# Patient Record
Sex: FEMALE | Race: WHITE | Employment: UNEMPLOYED | URBAN - METROPOLITAN AREA
[De-identification: names, ages, dates, MRNs, and addresses within clinical notes are randomized per-mention and may not be internally consistent; named-entity substitution may affect disease eponyms.]

---

## 2022-01-18 LAB
ALBUMIN SERPL-MCNC: 4.5 G/DL (ref 3.5–5.2)
ALP BLD-CCNC: 75 U/L (ref 35–104)
ALT SERPL-CCNC: 22 U/L (ref 0–32)
ANION GAP SERPL CALCULATED.3IONS-SCNC: 12 MMOL/L (ref 7–16)
ANISOCYTOSIS: ABNORMAL
AST SERPL-CCNC: 26 U/L (ref 0–31)
BASOPHILS ABSOLUTE: 0 E9/L (ref 0–0.2)
BASOPHILS RELATIVE PERCENT: 0.4 % (ref 0–2)
BILIRUB SERPL-MCNC: 0.6 MG/DL (ref 0–1.2)
BUN BLDV-MCNC: 17 MG/DL (ref 6–23)
BURR CELLS: ABNORMAL
C-REACTIVE PROTEIN: 0.3 MG/DL (ref 0–0.4)
CALCIUM SERPL-MCNC: 9.4 MG/DL (ref 8.6–10.2)
CHLORIDE BLD-SCNC: 106 MMOL/L (ref 98–107)
CO2: 22 MMOL/L (ref 22–29)
CREAT SERPL-MCNC: 0.8 MG/DL (ref 0.5–1)
EOSINOPHILS ABSOLUTE: 0.07 E9/L (ref 0.05–0.5)
EOSINOPHILS RELATIVE PERCENT: 2.6 % (ref 0–6)
GFR AFRICAN AMERICAN: >60
GFR NON-AFRICAN AMERICAN: >60 ML/MIN/1.73
GLUCOSE BLD-MCNC: 249 MG/DL (ref 74–99)
HCT VFR BLD CALC: 30.5 % (ref 34–48)
HEMOGLOBIN: 9.7 G/DL (ref 11.5–15.5)
HYPOCHROMIA: ABNORMAL
LYMPHOCYTES ABSOLUTE: 0.33 E9/L (ref 1.5–4)
LYMPHOCYTES RELATIVE PERCENT: 13.2 % (ref 20–42)
MCH RBC QN AUTO: 28.4 PG (ref 26–35)
MCHC RBC AUTO-ENTMCNC: 31.8 % (ref 32–34.5)
MCV RBC AUTO: 89.2 FL (ref 80–99.9)
MONOCYTES ABSOLUTE: 0.15 E9/L (ref 0.1–0.95)
MONOCYTES RELATIVE PERCENT: 6.1 % (ref 2–12)
NEUTROPHILS ABSOLUTE: 1.95 E9/L (ref 1.8–7.3)
NEUTROPHILS RELATIVE PERCENT: 78.1 % (ref 43–80)
OVALOCYTES: ABNORMAL
PDW BLD-RTO: 14 FL (ref 11.5–15)
PLATELET # BLD: 40 E9/L (ref 130–450)
PLATELET CONFIRMATION: NORMAL
PMV BLD AUTO: 12.6 FL (ref 7–12)
POIKILOCYTES: ABNORMAL
POLYCHROMASIA: ABNORMAL
POTASSIUM SERPL-SCNC: 4.2 MMOL/L (ref 3.5–5)
RBC # BLD: 3.42 E12/L (ref 3.5–5.5)
SODIUM BLD-SCNC: 140 MMOL/L (ref 132–146)
TOTAL PROTEIN: 7.4 G/DL (ref 6.4–8.3)
WBC # BLD: 2.5 E9/L (ref 4.5–11.5)

## 2022-01-19 LAB
GRAM STAIN ORDERABLE: NORMAL
SEDIMENTATION RATE, ERYTHROCYTE: 11 MM/HR (ref 0–20)

## 2022-01-21 LAB
ANAEROBIC CULTURE: NORMAL
ORGANISM: ABNORMAL
WOUND/ABSCESS: ABNORMAL
WOUND/ABSCESS: ABNORMAL

## 2022-04-06 ENCOUNTER — OFFICE VISIT (OUTPATIENT)
Dept: ENDOCRINOLOGY | Age: 62
End: 2022-04-06
Payer: MEDICARE

## 2022-04-06 ENCOUNTER — FOLLOWUP TELEPHONE ENCOUNTER (OUTPATIENT)
Dept: ENDOCRINOLOGY | Age: 62
End: 2022-04-06

## 2022-04-06 VITALS
HEIGHT: 70 IN | HEART RATE: 69 BPM | OXYGEN SATURATION: 99 % | SYSTOLIC BLOOD PRESSURE: 110 MMHG | WEIGHT: 223 LBS | DIASTOLIC BLOOD PRESSURE: 67 MMHG | BODY MASS INDEX: 31.92 KG/M2

## 2022-04-06 DIAGNOSIS — E11.65 UNCONTROLLED TYPE 2 DIABETES MELLITUS WITH HYPERGLYCEMIA (HCC): Primary | ICD-10-CM

## 2022-04-06 DIAGNOSIS — E66.09 CLASS 1 OBESITY DUE TO EXCESS CALORIES WITH SERIOUS COMORBIDITY AND BODY MASS INDEX (BMI) OF 32.0 TO 32.9 IN ADULT: ICD-10-CM

## 2022-04-06 DIAGNOSIS — E03.9 ACQUIRED HYPOTHYROIDISM: ICD-10-CM

## 2022-04-06 LAB — HBA1C MFR BLD: 8.4 %

## 2022-04-06 PROCEDURE — 3052F HG A1C>EQUAL 8.0%<EQUAL 9.0%: CPT | Performed by: CLINICAL NURSE SPECIALIST

## 2022-04-06 PROCEDURE — 2022F DILAT RTA XM EVC RTNOPTHY: CPT | Performed by: CLINICAL NURSE SPECIALIST

## 2022-04-06 PROCEDURE — G8417 CALC BMI ABV UP PARAM F/U: HCPCS | Performed by: CLINICAL NURSE SPECIALIST

## 2022-04-06 PROCEDURE — 3017F COLORECTAL CA SCREEN DOC REV: CPT | Performed by: CLINICAL NURSE SPECIALIST

## 2022-04-06 PROCEDURE — 99205 OFFICE O/P NEW HI 60 MIN: CPT | Performed by: CLINICAL NURSE SPECIALIST

## 2022-04-06 PROCEDURE — G8427 DOCREV CUR MEDS BY ELIG CLIN: HCPCS | Performed by: CLINICAL NURSE SPECIALIST

## 2022-04-06 PROCEDURE — 4004F PT TOBACCO SCREEN RCVD TLK: CPT | Performed by: CLINICAL NURSE SPECIALIST

## 2022-04-06 PROCEDURE — 83036 HEMOGLOBIN GLYCOSYLATED A1C: CPT | Performed by: CLINICAL NURSE SPECIALIST

## 2022-04-06 RX ORDER — CLOPIDOGREL BISULFATE 75 MG/1
TABLET ORAL
COMMUNITY
Start: 2022-01-10

## 2022-04-06 RX ORDER — ATORVASTATIN CALCIUM 40 MG/1
40 TABLET, FILM COATED ORAL DAILY
COMMUNITY

## 2022-04-06 RX ORDER — INSULIN DEGLUDEC INJECTION 100 U/ML
60 INJECTION, SOLUTION SUBCUTANEOUS
COMMUNITY
Start: 2022-03-28 | End: 2022-04-06 | Stop reason: SDUPTHER

## 2022-04-06 RX ORDER — DULAGLUTIDE 1.5 MG/.5ML
1.5 INJECTION, SOLUTION SUBCUTANEOUS WEEKLY
Qty: 12 PEN | Refills: 3
Start: 2022-04-06 | End: 2022-04-18 | Stop reason: SDUPTHER

## 2022-04-06 RX ORDER — LEVOTHYROXINE SODIUM 88 UG/1
TABLET ORAL
COMMUNITY
Start: 2022-02-20

## 2022-04-06 RX ORDER — PYRIDOXINE HCL (VITAMIN B6) 50 MG
50 TABLET ORAL DAILY
COMMUNITY
Start: 2022-03-07 | End: 2022-06-05

## 2022-04-06 RX ORDER — FERROUS SULFATE 325(65) MG
325 TABLET ORAL
COMMUNITY
Start: 2022-03-07 | End: 2022-06-05

## 2022-04-06 RX ORDER — INSULIN DEGLUDEC INJECTION 100 U/ML
INJECTION, SOLUTION SUBCUTANEOUS
Qty: 15 PEN | Refills: 3
Start: 2022-04-06

## 2022-04-06 RX ORDER — IRBESARTAN 150 MG/1
TABLET ORAL
COMMUNITY
Start: 2022-02-20

## 2022-04-06 NOTE — PROGRESS NOTES
[address] [facility] Department of Endocrinology Diabetes and Metabolism   [address] [address] [address] [address]   Phone: [phone]  Fax: [phone]    Date of Service: 4/6/2022    Primary Care Physician: Poonam Newton MD  Referring physician: Indu Herrera MD  Provider: FATOUMATA Carter     Reason for the visit:  Type 2 DM      History of Present Illness: The history is provided by the patient. No  was used. Accuracy of the patient data is excellent. Geeta Morales is a very pleasant 59 y.o. female seen today for diabetes management     Geeta Morales was diagnosed with diabetes at the age of 46   and currently on Metformin 500 mg 1 tablets BID. Tresiba 60 units at night   Decreased doses 2 years ago d/t recall   Was on Trulicity in the past but stopped when [facility] was started   The patient has been checking blood sugar once per day   Bg usually  160-200's   . Please see referral for most recent labs  Most recent A1c results summarized below  Lab Results   Component Value Date    LABA1C 8.4 04/06/2022    LABA1C 8.6 10/31/2017       Patient has had no hypoglycemic episodes   The patient hasn't been mindful of what has been eating and wasn't following diabetes diet    No regular exercise but active at work    I reviewed current medications and the patient has no issues with diabetes medications  Geeta Morales is up to date with eye exam and denied any history of diabetic retinopathy    And also performs  own feet care  Microvascular complications:  No Retinopathy, Nephropathy or Neuropathy   Macrovascular complications: no CAD, PVD, or + Stroke 1/2012  The patient receives Flushot every year and up to date   No HX of pancreatitis  No Hx of MTC  No HX of gastroparesis   No HX of UTI/Mycotic infection       PAST MEDICAL HISTORY   No past medical history on file. PAST SURGICAL HISTORY   No past surgical history on file.     SOCIAL HISTORY   Tobacco: has no history on file for tobacco use. Alcohol:   has no history on file for alcohol use. Drugs:   has no history on file for drug use. FAMILY HISTORY   No family history on file. ALLERGIES AND DRUG REACTIONS   Allergies   Allergen Reactions    Shellfish-Derived Products Hives    Bacitracin Itching    Codeine      Other reaction(s): Other: See Comments, Unknown  Tingling arms, heart palpitations      Penicillins      Other reaction(s): Unknown  In childhood      Azithromycin Rash    Erythromycin Rash    Metoprolol Rash       CURRENT MEDICATIONS   Current Outpatient Medications   Medication Sig Dispense Refill    atorvastatin (LIPITOR) 40 MG tablet Take 40 mg by mouth daily      clopidogrel (PLAVIX) 75 MG tablet       ferrous sulfate (IRON 325) 325 (65 Fe) MG tablet Take 325 mg by mouth daily (with breakfast)      levothyroxine (SYNTHROID) 88 MCG tablet       irbesartan (AVAPRO) 150 MG tablet       pyridoxine (B-6) 50 MG tablet Take 50 mg by mouth daily      Dulaglutide (TRULICITY) 1.5 SI/7.2MB SOPN Inject 1.5 mg into the skin once a week 12 pen 3    metFORMIN (GLUCOPHAGE) 1000 MG tablet Take 1 tablet by mouth 2 times daily (with meals) 180 tablet 3    TRESIBA FLEXTOUCH 100 UNIT/ML SOPN Inject 48 units subcutaneous  at night 15 pen 3     No current facility-administered medications for this visit. Review of Systems  Constitutional: No fever, no chills, no diaphoresis, no generalized weakness. HEENT: No blurred vision, No sore throat, no ear pain, no hair loss  Neck: denied any neck swelling, difficulty swallowing,   Cardio-pulmonary: No CP, SOB or palpitation, No orthopnea or PND. No cough or wheezing. GI: No N/V/D, no constipation, No abdominal pain, no melena or hematochezia   : Denied any dysuria, hematuria, flank pain, discharge, or incontinence. Skin: denied any rash, ulcer, Hirsute, or hyperpigmentation.    MSK: denied any joint deformity, joint pain/swelling, muscle pain, or back pain. Neuro: no numbness, no tingling, no weakness, _    OBJECTIVE    /67   Pulse 69   Ht 5' 10\" (1.778 m)   Wt 223 lb (101.2 kg)   SpO2 99%   BMI 32.00 kg/m²   BP Readings from Last 4 Encounters:   04/06/22 110/67     Wt Readings from Last 6 Encounters:   04/06/22 223 lb (101.2 kg)       Physical examination:  General: awake alert, oriented x3, no abnormal position or movements. HEENT: normocephalic non-traumatic, no exophthalmos   Neck: supple, no LN enlargement, no thyromegaly, no thyroid tenderness, no JVD. Pulm: Clear equal air entry no added sounds, no wheezing or rhonchi    CVS: S1 + S2, no murmur, no heave. Dorsalis pedis pulse palpable   Abd: soft lax, no tenderness, no organomegaly, audible bowel sounds. Skin: warm, no lesions, no rash.  No callus, no Ulcers, No acanthosis nigricans  Musculoskeletal: No back tenderness, no kyphosis/scoliosis    Neuro: CN intact, Monofilament sensation normal bilateral , muscle power normal  Psych: normal mood, and affect      Review of Laboratory Data:  I personally reviewed the following lab:  Lab Results   Component Value Date/Time    WBC 2.5 (L) 01/18/2022 03:15 PM    RBC 3.42 (L) 01/18/2022 03:15 PM    HGB 9.7 (L) 01/18/2022 03:15 PM    HCT 30.5 (L) 01/18/2022 03:15 PM    MCV 89.2 01/18/2022 03:15 PM    MCH 28.4 01/18/2022 03:15 PM    MCHC 31.8 (L) 01/18/2022 03:15 PM    RDW 14.0 01/18/2022 03:15 PM    PLT 40 (L) 01/18/2022 03:15 PM    MPV 12.6 (H) 01/18/2022 03:15 PM      Lab Results   Component Value Date/Time     01/18/2022 03:15 PM    K 4.2 01/18/2022 03:15 PM    CO2 22 01/18/2022 03:15 PM    BUN 17 01/18/2022 03:15 PM    CREATININE 0.8 01/18/2022 03:15 PM    CALCIUM 9.4 01/18/2022 03:15 PM    LABGLOM >60 01/18/2022 03:15 PM    GFRAA >60 01/18/2022 03:15 PM      No results found for: TSH, T4FREE, M5VEEKH, FT3, O2LILRH, TSI, TPOABS, THGAB  Lab Results   Component Value Date    LABA1C 8.4 04/06/2022    GLUCOSE 249 01/18/2022     Lab Results Component Value Date    LABA1C 8.4 04/06/2022    LABA1C 8.6 10/31/2017     No results found for: TRIG, HDL, LDLCALC, CHOL  No results found for: Concha 30   Sheryl García, a 59 y.o.-old female seen in for the following issues       Assessment:      Diagnosis Orders   1. Uncontrolled type 2 diabetes mellitus with hyperglycemia (HCC)  POCT glycosylated hemoglobin (Hb A1C)   2. Acquired hypothyroidism     3. Class 1 obesity due to excess calories with serious comorbidity and body mass index (BMI) of 32.0 to 32.9 in adult         Plan:     1. Uncontrolled type 2 diabetes mellitus with hyperglycemia ([facility] [address]   · Patient's diabetes is uncontrolled  · Hemoglobin A1c 8.4%  · Plan: Will restart Trulicity 6.34 mg once weekly for 2 weeks then increase to 1.5 mg thereafter. No contraindications. · Will increase metformin to 1000 mg twice daily  · Will decrease Tresiba to 48 units once per day to help prevent hypoglycemia  · The patient was advised to check blood sugars 2 times a day fasting and before dinner  · Discussed with patient A1c and blood sugar goals   · The patient counseled about the complications of uncontrolled diabetes   · Patient will need routine diabetes maintenance and prevention   · Discussed lifestyle changes including diet and exercise with patient; recommended 150 minutes of moderate intensity exercise per week. 2. Acquired hypothyroidism   · Continue levothyroxine 88 mcg 1 tablet once daily. Last TFT WNL   Patient taking on an empty stomach at least 1 hour before breakfast without combination of other medications   3. Class 1 obesity due to excess calories with serious comorbidity and body mass index (BMI) of 32.0 to 32.9 in adult    Discussed lifestyle changes including diet and exercise with patient in depth.  Also discussed with patient cardiovascular risk associated with obesity           I personally spent > 60 minutes spent  reviewed external notes from PCP and other patient's care team providers, and personally interpreted labs associated with the above diagnosis. I also ordered labs to further assess and manage the above addressed medical conditions. Return in about 3 months (around 7/6/2022). The above issues were reviewed with the patient who understood and agreed with the plan. Thank you for allowing us to participate in the care of this patient. Please do not hesitate to contact us with any additional questions. FATOUMATA Monsivais     [facility] Diabetes Care and Endocrinology   [address] [place] [address]   Phone: [phone]  Fax: [phone]  --------------------------------------------  An electronic signature was used to authenticate this note.  FATOUMATA Monsivais on 4/6/2022 at 11:41 AM

## 2022-04-06 NOTE — TELEPHONE ENCOUNTER
Patient pleasant and receptive to diabetes education. Per recall, patient eats 3 meals/day including a bowl of Cheerios at breakfast, turkey sandwich lunch and variety of meals for dinner. Patient states she will overeat at dinner, likely due to under-eating during the day. Patient does not eat a snack before going to bed. Educated on Diabetes Plate Method and healthy food choices. Made suggestions to balance meals and incorporate HS snack. Demonstrated understanding of carb counting using food lists with carbohydrate serving sizes. Instructed patient on 3 carbohydrate choices/meal and  1-2 choices HS snack. Patient able to plan menu items using meal planning food lists. Read CHO content of food correctly on nutrition facts using food label. Discussed lean proteins, low fat, and high fiber. Reviewed low sodium and avoiding added sugars. Reviewed portion sizes and benefits of measuring foods. Patient to follow up with RDN as needed.

## 2022-04-18 DIAGNOSIS — E11.65 UNCONTROLLED TYPE 2 DIABETES MELLITUS WITH HYPERGLYCEMIA (HCC): Primary | ICD-10-CM

## 2022-04-18 RX ORDER — DULAGLUTIDE 1.5 MG/.5ML
1.5 INJECTION, SOLUTION SUBCUTANEOUS WEEKLY
Qty: 12 PEN | Refills: 3 | Status: SHIPPED
Start: 2022-04-18 | End: 2022-07-06 | Stop reason: SDUPTHER

## 2022-07-06 ENCOUNTER — OFFICE VISIT (OUTPATIENT)
Dept: ENDOCRINOLOGY | Age: 62
End: 2022-07-06
Payer: MEDICARE

## 2022-07-06 VITALS
SYSTOLIC BLOOD PRESSURE: 112 MMHG | WEIGHT: 219 LBS | DIASTOLIC BLOOD PRESSURE: 68 MMHG | OXYGEN SATURATION: 99 % | BODY MASS INDEX: 31.35 KG/M2 | HEIGHT: 70 IN | HEART RATE: 68 BPM

## 2022-07-06 DIAGNOSIS — E11.65 UNCONTROLLED TYPE 2 DIABETES MELLITUS WITH HYPERGLYCEMIA (HCC): Primary | ICD-10-CM

## 2022-07-06 DIAGNOSIS — E03.9 ACQUIRED HYPOTHYROIDISM: ICD-10-CM

## 2022-07-06 DIAGNOSIS — E66.09 CLASS 1 OBESITY DUE TO EXCESS CALORIES WITH SERIOUS COMORBIDITY AND BODY MASS INDEX (BMI) OF 32.0 TO 32.9 IN ADULT: ICD-10-CM

## 2022-07-06 LAB — HBA1C MFR BLD: 6.8 %

## 2022-07-06 PROCEDURE — 4004F PT TOBACCO SCREEN RCVD TLK: CPT | Performed by: CLINICAL NURSE SPECIALIST

## 2022-07-06 PROCEDURE — 3044F HG A1C LEVEL LT 7.0%: CPT | Performed by: CLINICAL NURSE SPECIALIST

## 2022-07-06 PROCEDURE — 99214 OFFICE O/P EST MOD 30 MIN: CPT | Performed by: CLINICAL NURSE SPECIALIST

## 2022-07-06 PROCEDURE — 83036 HEMOGLOBIN GLYCOSYLATED A1C: CPT | Performed by: CLINICAL NURSE SPECIALIST

## 2022-07-06 PROCEDURE — 3017F COLORECTAL CA SCREEN DOC REV: CPT | Performed by: CLINICAL NURSE SPECIALIST

## 2022-07-06 PROCEDURE — G8417 CALC BMI ABV UP PARAM F/U: HCPCS | Performed by: CLINICAL NURSE SPECIALIST

## 2022-07-06 PROCEDURE — 2022F DILAT RTA XM EVC RTNOPTHY: CPT | Performed by: CLINICAL NURSE SPECIALIST

## 2022-07-06 PROCEDURE — G8427 DOCREV CUR MEDS BY ELIG CLIN: HCPCS | Performed by: CLINICAL NURSE SPECIALIST

## 2022-07-06 RX ORDER — DULAGLUTIDE 1.5 MG/.5ML
1.5 INJECTION, SOLUTION SUBCUTANEOUS WEEKLY
Qty: 12 PEN | Refills: 3 | Status: SHIPPED | OUTPATIENT
Start: 2022-07-06

## 2022-07-06 NOTE — PROGRESS NOTES
[address] [facility] Department of Endocrinology Diabetes and Metabolism   [address] [facility] [address]   Phone: [phone]  Fax: [phone]    Date of Service: 7/6/2022    Primary Care Physician: Marsha Reyes MD  Referring physician: No ref. provider found  Provider: FATOUMATA Hyde     Reason for the visit:  Type 2 DM      History of Present Illness: The history is provided by the patient. No  was used. Accuracy of the patient data is excellent. Beryle Reeds is a very pleasant 59 y.o. female seen today for diabetes management     Beryle Reeds was diagnosed with diabetes at the age of 46   and currently on Metformin 1000 mg 1 tablet BID. Tresiba 48 units at night , Trulicity 1.5     The patient has been checking blood sugar once per day   Bg usually mid 100's   . Please see referral for most recent labs  Most recent A1c results summarized below  Lab Results   Component Value Date/Time    LABA1C 6.8 07/06/2022 03:10 PM    LABA1C 8.4 04/06/2022 11:20 AM    LABA1C 8.6 10/31/2017 04:00 PM       Patient has had no hypoglycemic episodes   The patient hasn't been mindful of what has been eating and wasn't following diabetes diet    No regular exercise but active at work    I reviewed current medications and the patient has no issues with diabetes medications  Beryle Reeds is up to date with eye exam and denied any history of diabetic retinopathy    And also performs  own feet care  Microvascular complications:  No Retinopathy, Nephropathy or Neuropathy   Macrovascular complications: no CAD, PVD, or + Stroke 1/2012  The patient receives Flushot every year and up to date   No HX of pancreatitis  No Hx of MTC  No HX of gastroparesis   No HX of UTI/Mycotic infection       PAST MEDICAL HISTORY   No past medical history on file. PAST SURGICAL HISTORY   No past surgical history on file.     SOCIAL HISTORY   Tobacco:   has no history on file for tobacco use. Alcohol:   has no history on file for alcohol use. Drugs:   has no history on file for drug use. FAMILY HISTORY   No family history on file. ALLERGIES AND DRUG REACTIONS   Allergies   Allergen Reactions    Shellfish-Derived Products Hives    Bacitracin Itching    Codeine      Other reaction(s): Other: See Comments, Unknown  Tingling arms, heart palpitations      Penicillins      Other reaction(s): Unknown  In childhood      Azithromycin Rash    Erythromycin Rash    Metoprolol Rash       CURRENT MEDICATIONS   Current Outpatient Medications   Medication Sig Dispense Refill    Dulaglutide (TRULICITY) 1.5 GK/1.5AW SOPN Inject 1.5 mg into the skin once a week 12 pen 3    levothyroxine (SYNTHROID) 88 MCG tablet       metFORMIN (GLUCOPHAGE) 1000 MG tablet Take 1 tablet by mouth 2 times daily (with meals) 180 tablet 3    TRESIBA FLEXTOUCH 100 UNIT/ML SOPN Inject 48 units subcutaneous  at night 15 pen 3    atorvastatin (LIPITOR) 40 MG tablet Take 40 mg by mouth daily      clopidogrel (PLAVIX) 75 MG tablet       ferrous sulfate (IRON 325) 325 (65 Fe) MG tablet Take 325 mg by mouth daily (with breakfast)      irbesartan (AVAPRO) 150 MG tablet       pyridoxine (B-6) 50 MG tablet Take 50 mg by mouth daily       No current facility-administered medications for this visit. Review of Systems  Constitutional: No fever, no chills, no diaphoresis, no generalized weakness. HEENT: No blurred vision, No sore throat, no ear pain, no hair loss  Neck: denied any neck swelling, difficulty swallowing,   Cardio-pulmonary: No CP, SOB or palpitation, No orthopnea or PND. No cough or wheezing. GI: No N/V/D, no constipation, No abdominal pain, no melena or hematochezia   : Denied any dysuria, hematuria, flank pain, discharge, or incontinence. Skin: denied any rash, ulcer, Hirsute, or hyperpigmentation. MSK: denied any joint deformity, joint pain/swelling, muscle pain, or back pain.   Neuro: no numbness, no tingling, no weakness, _    OBJECTIVE    /68   Pulse 68   Ht 5' 10\" (1.778 m)   Wt 219 lb (99.3 kg)   SpO2 99%   BMI 31.42 kg/m²   BP Readings from Last 4 Encounters:   07/06/22 112/68   04/06/22 110/67     Wt Readings from Last 6 Encounters:   07/06/22 219 lb (99.3 kg)   04/06/22 223 lb (101.2 kg)       Physical examination:  General: awake alert, oriented x3, no abnormal position or movements. HEENT: normocephalic non-traumatic, no exophthalmos   Neck: supple, no LN enlargement, no thyromegaly, no thyroid tenderness, no JVD. Pulm: Clear equal air entry no added sounds, no wheezing or rhonchi    CVS: S1 + S2, no murmur, no heave. Dorsalis pedis pulse palpable   Abd: soft lax, no tenderness, no organomegaly, audible bowel sounds. Skin: warm, no lesions, no rash.  No callus, no Ulcers, No acanthosis nigricans  Musculoskeletal: No back tenderness, no kyphosis/scoliosis    Neuro: CN intact, Monofilament sensation normal bilateral , muscle power normal  Psych: normal mood, and affect      Review of Laboratory Data:  I personally reviewed the following lab:  Lab Results   Component Value Date/Time    WBC 2.5 (L) 01/18/2022 03:15 PM    RBC 3.42 (L) 01/18/2022 03:15 PM    HGB 9.7 (L) 01/18/2022 03:15 PM    HCT 30.5 (L) 01/18/2022 03:15 PM    MCV 89.2 01/18/2022 03:15 PM    MCH 28.4 01/18/2022 03:15 PM    MCHC 31.8 (L) 01/18/2022 03:15 PM    RDW 14.0 01/18/2022 03:15 PM    PLT 40 (L) 01/18/2022 03:15 PM    MPV 12.6 (H) 01/18/2022 03:15 PM      Lab Results   Component Value Date/Time     01/18/2022 03:15 PM    K 4.2 01/18/2022 03:15 PM    CO2 22 01/18/2022 03:15 PM    BUN 17 01/18/2022 03:15 PM    CREATININE 0.8 01/18/2022 03:15 PM    CALCIUM 9.4 01/18/2022 03:15 PM    LABGLOM >60 01/18/2022 03:15 PM    GFRAA >60 01/18/2022 03:15 PM      No results found for: TSH, T4FREE, M9BRAGN, FT3, V9TYXYG, TSI, TPOABS, THGAB  Lab Results   Component Value Date/Time    LABA1C 6.8 07/06/2022 03:10 PM GLUCOSE 249 01/18/2022 03:15 PM     Lab Results   Component Value Date/Time    LABA1C 6.8 07/06/2022 03:10 PM    LABA1C 8.4 04/06/2022 11:20 AM    LABA1C 8.6 10/31/2017 04:00 PM     No results found for: TRIG, HDL, LDLCALC, CHOL  No results found for: Concha Mason, a 59 y.o.-old female seen in for the following issues       Assessment:      Diagnosis Orders   1. Uncontrolled type 2 diabetes mellitus with hyperglycemia (HCC)  POCT glycosylated hemoglobin (Hb A1C)    Dulaglutide (TRULICITY) 1.5 VL/1.8GG SOPN   2. Acquired hypothyroidism     3. Class 1 obesity due to excess calories with serious comorbidity and body mass index (BMI) of 32.0 to 32.9 in adult         Plan:     1. Uncontrolled type 2 diabetes mellitus with hyperglycemia ([facility] [address]   · Patient's diabetes much improved from previous. · Hemoglobin A1c 6.8%  · Congratulated on glycemic improvement  · Plan:Continue Trulicity 1.5 mg once weekly   · Continue metformin to 1000 mg twice daily  · Continue Tresiba to 48 units once per day   · The patient was advised to check blood sugars 2 times a day fasting and before dinner  · Discussed with patient A1c and blood sugar goals   · The patient counseled about the complications of uncontrolled diabetes    · I encouraged diet and exercise     2. Acquired hypothyroidism   · Continue levothyroxine 88 mcg 1 tablet once daily. ·  Last TFT WNL     · Patient taking on an empty stomach at least 1 hour before breakfast without combination of other medications   3. Class 1 obesity due to excess calories with serious comorbidity and body mass index (BMI) of 31.0 to 31.9 in adult    Discussed lifestyle changes including diet and exercise with patient in depth.  Also discussed with patient cardiovascular risk associated with obesity           I personally spent > 30 minutes spent  reviewed external notes from PCP and other patient's care team providers, and personally interpreted labs associated with the above diagnosis. I also ordered labs to further assess and manage the above addressed medical conditions. Return in about 4 months (around 11/6/2022). The above issues were reviewed with the patient who understood and agreed with the plan. Thank you for allowing us to participate in the care of this patient. Please do not hesitate to contact us with any additional questions. FATOUMATA Delgado     [facility] Diabetes Care and Endocrinology   [address] [place] [address]   Phone: [phone]  Fax: [phone]  --------------------------------------------  An electronic signature was used to authenticate this note.  FATOUMATA Delgado on 7/6/2022 at 3:19 PM

## 2022-11-09 ENCOUNTER — OFFICE VISIT (OUTPATIENT)
Dept: ENDOCRINOLOGY | Age: 62
End: 2022-11-09
Payer: MEDICARE

## 2022-11-09 VITALS
WEIGHT: 221 LBS | DIASTOLIC BLOOD PRESSURE: 66 MMHG | BODY MASS INDEX: 31.64 KG/M2 | OXYGEN SATURATION: 100 % | HEIGHT: 70 IN | HEART RATE: 73 BPM | SYSTOLIC BLOOD PRESSURE: 113 MMHG

## 2022-11-09 DIAGNOSIS — E66.09 CLASS 1 OBESITY DUE TO EXCESS CALORIES WITH SERIOUS COMORBIDITY AND BODY MASS INDEX (BMI) OF 32.0 TO 32.9 IN ADULT: ICD-10-CM

## 2022-11-09 DIAGNOSIS — E03.9 ACQUIRED HYPOTHYROIDISM: ICD-10-CM

## 2022-11-09 DIAGNOSIS — E11.65 UNCONTROLLED TYPE 2 DIABETES MELLITUS WITH HYPERGLYCEMIA (HCC): Primary | ICD-10-CM

## 2022-11-09 LAB — HBA1C MFR BLD: 7 %

## 2022-11-09 PROCEDURE — G8484 FLU IMMUNIZE NO ADMIN: HCPCS | Performed by: CLINICAL NURSE SPECIALIST

## 2022-11-09 PROCEDURE — 2022F DILAT RTA XM EVC RTNOPTHY: CPT | Performed by: CLINICAL NURSE SPECIALIST

## 2022-11-09 PROCEDURE — 4004F PT TOBACCO SCREEN RCVD TLK: CPT | Performed by: CLINICAL NURSE SPECIALIST

## 2022-11-09 PROCEDURE — G8417 CALC BMI ABV UP PARAM F/U: HCPCS | Performed by: CLINICAL NURSE SPECIALIST

## 2022-11-09 PROCEDURE — 3017F COLORECTAL CA SCREEN DOC REV: CPT | Performed by: CLINICAL NURSE SPECIALIST

## 2022-11-09 PROCEDURE — 83036 HEMOGLOBIN GLYCOSYLATED A1C: CPT | Performed by: CLINICAL NURSE SPECIALIST

## 2022-11-09 PROCEDURE — 99214 OFFICE O/P EST MOD 30 MIN: CPT | Performed by: CLINICAL NURSE SPECIALIST

## 2022-11-09 PROCEDURE — G8427 DOCREV CUR MEDS BY ELIG CLIN: HCPCS | Performed by: CLINICAL NURSE SPECIALIST

## 2022-11-09 PROCEDURE — 3051F HG A1C>EQUAL 7.0%<8.0%: CPT | Performed by: CLINICAL NURSE SPECIALIST

## 2022-11-09 NOTE — PROGRESS NOTES
[address] [facility] Department of Endocrinology Diabetes and Metabolism   [address] [facility] [address]   Phone: [phone]  Fax: [phone]    Date of Service: 11/9/2022    Primary Care Physician: Ashanti Capps MD  Referring physician: No ref. provider found  Provider: FATOUMATA Rodriguez     Reason for the visit:  Type 2 DM      History of Present Illness: The history is provided by the patient. No  was used. Accuracy of the patient data is excellent. Emily Thornton is a very pleasant 59 y.o. female seen today for diabetes management     Emily Thornton was diagnosed with diabetes at the age of 46   and currently on Metformin 1000 mg 1 tablet BID. Tresiba 48 units at night , Trulicity 1.5 mg once weekly     The patient has been checking blood sugar once per day   Bg usually mid 100's   . Please see referral for most recent labs  Most recent A1c results summarized below  Lab Results   Component Value Date/Time    LABA1C 7.0 11/09/2022 01:01 PM    LABA1C 6.8 07/06/2022 03:10 PM    LABA1C 8.4 04/06/2022 11:20 AM       Patient has had no hypoglycemic episodes   The patient hasn't been mindful of what has been eating and wasn't following diabetes diet    No regular exercise but active at work    I reviewed current medications and the patient has no issues with diabetes medications  Emily Thornton is up to date with eye exam and denied any history of diabetic retinopathy    And also performs  own feet care  Microvascular complications:  No Retinopathy, Nephropathy or Neuropathy   Macrovascular complications: no CAD, PVD, or + Stroke 1/2012  The patient receives Flushot every year and up to date   No HX of pancreatitis  No Hx of MTC  No HX of gastroparesis   No HX of UTI/Mycotic infection       PAST MEDICAL HISTORY   No past medical history on file. PAST SURGICAL HISTORY   No past surgical history on file.     SOCIAL HISTORY   Tobacco:   has no history numbness, no tingling, no weakness, _    OBJECTIVE    /66   Pulse 73   Ht 5' 10\" (1.778 m)   Wt 221 lb (100.2 kg)   SpO2 100%   BMI 31.71 kg/m²   BP Readings from Last 4 Encounters:   11/09/22 113/66   07/06/22 112/68   04/06/22 110/67     Wt Readings from Last 6 Encounters:   11/09/22 221 lb (100.2 kg)   07/06/22 219 lb (99.3 kg)   04/06/22 223 lb (101.2 kg)       Physical examination:  General: awake alert, oriented x3, no abnormal position or movements. HEENT: normocephalic non-traumatic, no exophthalmos   Neck: supple, no LN enlargement, no thyromegaly, no thyroid tenderness, no JVD. Pulm: Clear equal air entry no added sounds, no wheezing or rhonchi    CVS: S1 + S2, no murmur, no heave. Dorsalis pedis pulse palpable   Abd: soft lax, no tenderness, no organomegaly, audible bowel sounds. Skin: warm, no lesions, no rash.  No callus, no Ulcers, No acanthosis nigricans  Musculoskeletal: No back tenderness, no kyphosis/scoliosis    Neuro: CN intact, Monofilament sensation normal bilateral , muscle power normal  Psych: normal mood, and affect      Review of Laboratory Data:  I personally reviewed the following lab:  Lab Results   Component Value Date/Time    WBC 2.5 (L) 01/18/2022 03:15 PM    RBC 3.42 (L) 01/18/2022 03:15 PM    HGB 9.7 (L) 01/18/2022 03:15 PM    HCT 30.5 (L) 01/18/2022 03:15 PM    MCV 89.2 01/18/2022 03:15 PM    MCH 28.4 01/18/2022 03:15 PM    MCHC 31.8 (L) 01/18/2022 03:15 PM    RDW 14.0 01/18/2022 03:15 PM    PLT 40 (L) 01/18/2022 03:15 PM    MPV 12.6 (H) 01/18/2022 03:15 PM      Lab Results   Component Value Date/Time     01/18/2022 03:15 PM    K 4.2 01/18/2022 03:15 PM    CO2 22 01/18/2022 03:15 PM    BUN 17 01/18/2022 03:15 PM    CREATININE 0.8 01/18/2022 03:15 PM    CALCIUM 9.4 01/18/2022 03:15 PM    LABGLOM >60 01/18/2022 03:15 PM    GFRAA >60 01/18/2022 03:15 PM      No results found for: TSH, T4FREE, V6DCWTB, FT3, F2GFXTP, TSI, TPOABS, THGAB  Lab Results   Component Value Date/Time    LABA1C 7.0 11/09/2022 01:01 PM    GLUCOSE 249 01/18/2022 03:15 PM     Lab Results   Component Value Date/Time    LABA1C 7.0 11/09/2022 01:01 PM    LABA1C 6.8 07/06/2022 03:10 PM    LABA1C 8.4 04/06/2022 11:20 AM     No results found for: TRIG, HDL, LDLCALC, CHOL  No results found for: Concha 30   Micah Rogel, a 59 y.o.-old female seen in for the following issues       Assessment:      Diagnosis Orders   1. Uncontrolled type 2 diabetes mellitus with hyperglycemia (HCC)  POCT glycosylated hemoglobin (Hb A1C)    Comprehensive Metabolic Panel    Lipid Panel    Microalbumin / Creatinine Urine Ratio      2. Acquired hypothyroidism  TSH    T4, Free      3. Class 1 obesity due to excess calories with serious comorbidity and body mass index (BMI) of 32.0 to 32.9 in adult              Plan:     1. Uncontrolled type 2 diabetes mellitus with hyperglycemia (HCC)   Patient's diabetes stable  Hemoglobin A1c 7%  Patient will be starting dexamethasone 20 mg x 4 days in the future  Sample of Lyumjev given to patient and I advised patient to use moderate dose insulin sliding scale AC only during this time. Plan:Continue Trulicity 1.5 mg once weekly   Continue metformin to 1000 mg twice daily  Continue Tresiba to 48 units once per day   The patient was advised to check blood sugars 2 times a day fasting and before dinner  Discussed with patient A1c and blood sugar goals   The patient counseled about the complications of uncontrolled diabetes    I encouraged diet and exercise  Diabetic labs ordered     2. Acquired hypothyroidism   Continue levothyroxine 88 mcg 1 tablet once daily.    Last TFT WNL     Patient taking on an empty stomach at least 1 hour before breakfast without combination of other medications  Will reassess TFTs   3. Class 1 obesity due to excess calories with serious comorbidity and body mass index (BMI) of 31.0 to 31.9 in adult   Discussed lifestyle changes including diet and exercise with patient in depth. Also discussed with patient cardiovascular risk associated with obesity           I personally spent > 30 minutes spent  reviewed external notes from PCP and other patient's care team providers, and personally interpreted labs associated with the above diagnosis. I also ordered labs to further assess and manage the above addressed medical conditions. Return in about 3 months (around 2/9/2023). The above issues were reviewed with the patient who understood and agreed with the plan. Thank you for allowing us to participate in the care of this patient. Please do not hesitate to contact us with any additional questions. FATOUMATA Marquez     [facility]   [address] [place] [address]   Phone: [phone]  Fax: [phone]  --------------------------------------------  An electronic signature was used to authenticate this note.  FATOUMATA Marquez on 11/9/2022 at 1:11 PM

## 2022-12-01 LAB
ALBUMIN SERPL-MCNC: 4.2 G/DL
ALP BLD-CCNC: NORMAL U/L
ALT SERPL-CCNC: NORMAL U/L
ANION GAP SERPL CALCULATED.3IONS-SCNC: NORMAL MMOL/L
AST SERPL-CCNC: NORMAL U/L
BILIRUB SERPL-MCNC: NORMAL MG/DL
BUN BLDV-MCNC: NORMAL MG/DL
CALCIUM SERPL-MCNC: 9.1 MG/DL
CHLORIDE BLD-SCNC: 109 MMOL/L
CHOLESTEROL, TOTAL: 101 MG/DL
CHOLESTEROL/HDL RATIO: NORMAL
CO2: NORMAL
CREAT SERPL-MCNC: 0.82 MG/DL
GFR CALCULATED: NORMAL
GLUCOSE BLD-MCNC: NORMAL MG/DL
HDLC SERPL-MCNC: 42 MG/DL (ref 35–70)
LDL CHOLESTEROL CALCULATED: 40 MG/DL (ref 0–160)
NONHDLC SERPL-MCNC: NORMAL MG/DL
POTASSIUM SERPL-SCNC: 4 MMOL/L
SODIUM BLD-SCNC: 139 MMOL/L
T4 FREE: 1.1
TOTAL PROTEIN: NORMAL
TRIGL SERPL-MCNC: 100 MG/DL
TSH SERPL DL<=0.05 MIU/L-ACNC: 5.44 UIU/ML
VLDLC SERPL CALC-MCNC: NORMAL MG/DL

## 2022-12-08 DIAGNOSIS — E03.9 ACQUIRED HYPOTHYROIDISM: ICD-10-CM

## 2022-12-08 DIAGNOSIS — E11.65 UNCONTROLLED TYPE 2 DIABETES MELLITUS WITH HYPERGLYCEMIA (HCC): ICD-10-CM

## 2022-12-09 ENCOUNTER — TELEPHONE (OUTPATIENT)
Dept: ENDOCRINOLOGY | Age: 62
End: 2022-12-09

## 2022-12-09 NOTE — TELEPHONE ENCOUNTER
Called, spoke to patient and gave results and recommendations/instructions  Pt hasn't missed any doses so she will increase to 88 mcg 1 tab Mon-Sat & 1.5 tab Sun

## 2022-12-09 NOTE — TELEPHONE ENCOUNTER
----- Message from FAOTUMATA Carrasco sent at 12/8/2022  1:55 PM EST -----  Please call patient and inform her I have reviewed blood work  TSH is mildly elevated  Please inquire if patient has missed any doses of levothyroxine  If patient has not missed any doses of levothyroxine I recommend patient increase dose to 88 mcg 1 tablet Monday through Saturday, 1.5 tablets on Sunday  Please let me know

## 2022-12-12 RX ORDER — LEVOTHYROXINE SODIUM 88 UG/1
TABLET ORAL
Qty: 32 TABLET | Refills: 4 | Status: SHIPPED | OUTPATIENT
Start: 2022-12-12

## 2023-01-22 ENCOUNTER — APPOINTMENT (OUTPATIENT)
Dept: CT IMAGING | Age: 63
End: 2023-01-22
Payer: MEDICARE

## 2023-01-22 ENCOUNTER — HOSPITAL ENCOUNTER (INPATIENT)
Age: 63
LOS: 5 days | Discharge: HOME | End: 2023-01-27
Attending: EMERGENCY MEDICINE | Admitting: STUDENT IN AN ORGANIZED HEALTH CARE EDUCATION/TRAINING PROGRAM
Payer: MEDICARE

## 2023-01-22 DIAGNOSIS — K92.2 UPPER GI BLEED: Primary | ICD-10-CM

## 2023-01-22 PROBLEM — K22.89 ESOPHAGEAL BLEED, NON-VARICEAL: Status: ACTIVE | Noted: 2023-01-22

## 2023-01-22 LAB
ABO/RH: NORMAL
ACETAMINOPHEN LEVEL: <5 MCG/ML (ref 10–30)
ALBUMIN SERPL-MCNC: 3.7 G/DL (ref 3.5–5.2)
ALP BLD-CCNC: 48 U/L (ref 35–104)
ALT SERPL-CCNC: 18 U/L (ref 0–32)
AMMONIA: <10 UMOL/L (ref 11–51)
ANION GAP SERPL CALCULATED.3IONS-SCNC: 12 MMOL/L (ref 7–16)
ANTIBODY SCREEN: NORMAL
APTT: 28.9 SEC (ref 24.5–35.1)
AST SERPL-CCNC: 16 U/L (ref 0–31)
BACTERIA: ABNORMAL /HPF
BASOPHILS ABSOLUTE: 0.01 E9/L (ref 0–0.2)
BASOPHILS RELATIVE PERCENT: 0.3 % (ref 0–2)
BILIRUB SERPL-MCNC: 0.8 MG/DL (ref 0–1.2)
BILIRUBIN DIRECT: 0.2 MG/DL (ref 0–0.3)
BILIRUBIN URINE: NEGATIVE
BILIRUBIN, INDIRECT: 0.6 MG/DL (ref 0–1)
BLOOD, URINE: ABNORMAL
BUN BLDV-MCNC: 44 MG/DL (ref 6–23)
CALCIUM SERPL-MCNC: 9.4 MG/DL (ref 8.6–10.2)
CHLORIDE BLD-SCNC: 104 MMOL/L (ref 98–107)
CLARITY: CLEAR
CO2: 22 MMOL/L (ref 22–29)
COLOR: YELLOW
CREAT SERPL-MCNC: 0.7 MG/DL (ref 0.5–1)
DR. NOTIFY: NORMAL
EOSINOPHILS ABSOLUTE: 0.02 E9/L (ref 0.05–0.5)
EOSINOPHILS RELATIVE PERCENT: 0.6 % (ref 0–6)
ETHANOL: <10 MG/DL (ref 0–0.08)
GFR SERPL CREATININE-BSD FRML MDRD: >60 ML/MIN/1.73
GLUCOSE BLD-MCNC: 277 MG/DL (ref 74–99)
GLUCOSE URINE: 250 MG/DL
HCT VFR BLD CALC: 19.7 % (ref 34–48)
HCT VFR BLD CALC: 23.1 % (ref 34–48)
HCT VFR BLD CALC: 23.5 % (ref 34–48)
HEMOGLOBIN: 6.6 G/DL (ref 11.5–15.5)
HEMOGLOBIN: 7.7 G/DL (ref 11.5–15.5)
HEMOGLOBIN: 8 G/DL (ref 11.5–15.5)
HYPOCHROMIA: ABNORMAL
IMMATURE GRANULOCYTES #: 0.02 E9/L
IMMATURE GRANULOCYTES %: 0.6 % (ref 0–5)
INR BLD: 1.4
KETONES, URINE: ABNORMAL MG/DL
LEUKOCYTE ESTERASE, URINE: ABNORMAL
LIPASE: 25 U/L (ref 13–60)
LYMPHOCYTES ABSOLUTE: 0.44 E9/L (ref 1.5–4)
LYMPHOCYTES RELATIVE PERCENT: 13.5 % (ref 20–42)
MCH RBC QN AUTO: 31.7 PG (ref 26–35)
MCHC RBC AUTO-ENTMCNC: 34 % (ref 32–34.5)
MCV RBC AUTO: 93.3 FL (ref 80–99.9)
MONOCYTES ABSOLUTE: 0.22 E9/L (ref 0.1–0.95)
MONOCYTES RELATIVE PERCENT: 6.8 % (ref 2–12)
NEUTROPHILS ABSOLUTE: 2.54 E9/L (ref 1.8–7.3)
NEUTROPHILS RELATIVE PERCENT: 78.2 % (ref 43–80)
NITRITE, URINE: POSITIVE
OVALOCYTES: ABNORMAL
PDW BLD-RTO: 13.9 FL (ref 11.5–15)
PH UA: 5.5 (ref 5–9)
PLATELET # BLD: 34 E9/L (ref 130–450)
PLATELET CONFIRMATION: NORMAL
PMV BLD AUTO: 11.9 FL (ref 7–12)
POIKILOCYTES: ABNORMAL
POLYCHROMASIA: ABNORMAL
POTASSIUM SERPL-SCNC: 4.4 MMOL/L (ref 3.5–5)
PROTEIN UA: NEGATIVE MG/DL
PROTHROMBIN TIME: 16.2 SEC (ref 9.3–12.4)
RBC # BLD: 2.52 E12/L (ref 3.5–5.5)
RBC UA: ABNORMAL /HPF (ref 0–2)
REASON FOR REJECTION: NORMAL
REJECTED TEST: NORMAL
SALICYLATE, SERUM: <0.3 MG/DL (ref 0–30)
SODIUM BLD-SCNC: 138 MMOL/L (ref 132–146)
SPECIFIC GRAVITY UA: 1.02 (ref 1–1.03)
TEAR DROP CELLS: ABNORMAL
TOTAL PROTEIN: 5.9 G/DL (ref 6.4–8.3)
TRICYCLIC ANTIDEPRESSANTS SCREEN SERUM: NEGATIVE NG/ML
UROBILINOGEN, URINE: 0.2 E.U./DL
WBC # BLD: 3.3 E9/L (ref 4.5–11.5)
WBC UA: ABNORMAL /HPF (ref 0–5)

## 2023-01-22 PROCEDURE — 6360000002 HC RX W HCPCS: Performed by: STUDENT IN AN ORGANIZED HEALTH CARE EDUCATION/TRAINING PROGRAM

## 2023-01-22 PROCEDURE — 96372 THER/PROPH/DIAG INJ SC/IM: CPT

## 2023-01-22 PROCEDURE — 80307 DRUG TEST PRSMV CHEM ANLYZR: CPT

## 2023-01-22 PROCEDURE — 85025 COMPLETE CBC W/AUTO DIFF WBC: CPT

## 2023-01-22 PROCEDURE — 82077 ASSAY SPEC XCP UR&BREATH IA: CPT

## 2023-01-22 PROCEDURE — 2580000003 HC RX 258: Performed by: STUDENT IN AN ORGANIZED HEALTH CARE EDUCATION/TRAINING PROGRAM

## 2023-01-22 PROCEDURE — 74177 CT ABD & PELVIS W/CONTRAST: CPT

## 2023-01-22 PROCEDURE — 2580000003 HC RX 258

## 2023-01-22 PROCEDURE — 96375 TX/PRO/DX INJ NEW DRUG ADDON: CPT

## 2023-01-22 PROCEDURE — 86923 COMPATIBILITY TEST ELECTRIC: CPT

## 2023-01-22 PROCEDURE — 87081 CULTURE SCREEN ONLY: CPT

## 2023-01-22 PROCEDURE — P9073 PLATELETS PHERESIS PATH REDU: HCPCS

## 2023-01-22 PROCEDURE — 86901 BLOOD TYPING SEROLOGIC RH(D): CPT

## 2023-01-22 PROCEDURE — 2000000000 HC ICU R&B

## 2023-01-22 PROCEDURE — 85610 PROTHROMBIN TIME: CPT

## 2023-01-22 PROCEDURE — 86850 RBC ANTIBODY SCREEN: CPT

## 2023-01-22 PROCEDURE — 85014 HEMATOCRIT: CPT

## 2023-01-22 PROCEDURE — 99223 1ST HOSP IP/OBS HIGH 75: CPT | Performed by: STUDENT IN AN ORGANIZED HEALTH CARE EDUCATION/TRAINING PROGRAM

## 2023-01-22 PROCEDURE — 85018 HEMOGLOBIN: CPT

## 2023-01-22 PROCEDURE — 6360000002 HC RX W HCPCS

## 2023-01-22 PROCEDURE — 81001 URINALYSIS AUTO W/SCOPE: CPT

## 2023-01-22 PROCEDURE — 99291 CRITICAL CARE FIRST HOUR: CPT

## 2023-01-22 PROCEDURE — 80179 DRUG ASSAY SALICYLATE: CPT

## 2023-01-22 PROCEDURE — 87088 URINE BACTERIA CULTURE: CPT

## 2023-01-22 PROCEDURE — 82140 ASSAY OF AMMONIA: CPT

## 2023-01-22 PROCEDURE — 84145 PROCALCITONIN (PCT): CPT

## 2023-01-22 PROCEDURE — 80076 HEPATIC FUNCTION PANEL: CPT

## 2023-01-22 PROCEDURE — 6360000004 HC RX CONTRAST MEDICATION: Performed by: RADIOLOGY

## 2023-01-22 PROCEDURE — 6360000002 HC RX W HCPCS: Performed by: EMERGENCY MEDICINE

## 2023-01-22 PROCEDURE — 86900 BLOOD TYPING SEROLOGIC ABO: CPT

## 2023-01-22 PROCEDURE — C9113 INJ PANTOPRAZOLE SODIUM, VIA: HCPCS | Performed by: STUDENT IN AN ORGANIZED HEALTH CARE EDUCATION/TRAINING PROGRAM

## 2023-01-22 PROCEDURE — 2580000003 HC RX 258: Performed by: EMERGENCY MEDICINE

## 2023-01-22 PROCEDURE — P9016 RBC LEUKOCYTES REDUCED: HCPCS

## 2023-01-22 PROCEDURE — C9113 INJ PANTOPRAZOLE SODIUM, VIA: HCPCS | Performed by: EMERGENCY MEDICINE

## 2023-01-22 PROCEDURE — 87186 SC STD MICRODIL/AGAR DIL: CPT

## 2023-01-22 PROCEDURE — 96374 THER/PROPH/DIAG INJ IV PUSH: CPT

## 2023-01-22 PROCEDURE — 85730 THROMBOPLASTIN TIME PARTIAL: CPT

## 2023-01-22 PROCEDURE — 80143 DRUG ASSAY ACETAMINOPHEN: CPT

## 2023-01-22 PROCEDURE — 93005 ELECTROCARDIOGRAM TRACING: CPT

## 2023-01-22 PROCEDURE — 83690 ASSAY OF LIPASE: CPT

## 2023-01-22 PROCEDURE — 80048 BASIC METABOLIC PNL TOTAL CA: CPT

## 2023-01-22 PROCEDURE — 99285 EMERGENCY DEPT VISIT HI MDM: CPT

## 2023-01-22 RX ORDER — ACETAMINOPHEN 325 MG/1
650 TABLET ORAL EVERY 6 HOURS PRN
Status: DISCONTINUED | OUTPATIENT
Start: 2023-01-22 | End: 2023-01-27 | Stop reason: HOSPADM

## 2023-01-22 RX ORDER — PREDNISONE 20 MG/1
80 TABLET ORAL DAILY
Status: DISCONTINUED | OUTPATIENT
Start: 2023-01-22 | End: 2023-01-23

## 2023-01-22 RX ORDER — INSULIN LISPRO 100 [IU]/ML
0-4 INJECTION, SOLUTION INTRAVENOUS; SUBCUTANEOUS EVERY 6 HOURS
Status: DISCONTINUED | OUTPATIENT
Start: 2023-01-22 | End: 2023-01-24

## 2023-01-22 RX ORDER — 0.9 % SODIUM CHLORIDE 0.9 %
1000 INTRAVENOUS SOLUTION INTRAVENOUS ONCE
Status: COMPLETED | OUTPATIENT
Start: 2023-01-22 | End: 2023-01-22

## 2023-01-22 RX ORDER — OCTREOTIDE ACETATE 100 UG/ML
100 INJECTION, SOLUTION INTRAVENOUS; SUBCUTANEOUS ONCE
Status: COMPLETED | OUTPATIENT
Start: 2023-01-22 | End: 2023-01-22

## 2023-01-22 RX ORDER — POTASSIUM CHLORIDE 7.45 MG/ML
10 INJECTION INTRAVENOUS PRN
Status: DISCONTINUED | OUTPATIENT
Start: 2023-01-22 | End: 2023-01-24

## 2023-01-22 RX ORDER — SODIUM CHLORIDE 0.9 % (FLUSH) 0.9 %
5-40 SYRINGE (ML) INJECTION PRN
Status: DISCONTINUED | OUTPATIENT
Start: 2023-01-22 | End: 2023-01-27 | Stop reason: HOSPADM

## 2023-01-22 RX ORDER — DIPHENHYDRAMINE HCL 25 MG
50 TABLET ORAL ONCE
Status: DISCONTINUED | OUTPATIENT
Start: 2023-01-22 | End: 2023-01-22

## 2023-01-22 RX ORDER — SODIUM CHLORIDE 9 MG/ML
INJECTION, SOLUTION INTRAVENOUS PRN
Status: DISCONTINUED | OUTPATIENT
Start: 2023-01-22 | End: 2023-01-27 | Stop reason: HOSPADM

## 2023-01-22 RX ORDER — ONDANSETRON 2 MG/ML
4 INJECTION INTRAMUSCULAR; INTRAVENOUS EVERY 6 HOURS PRN
Status: DISCONTINUED | OUTPATIENT
Start: 2023-01-22 | End: 2023-01-27 | Stop reason: HOSPADM

## 2023-01-22 RX ORDER — ACETAMINOPHEN 650 MG/1
650 SUPPOSITORY RECTAL EVERY 6 HOURS PRN
Status: DISCONTINUED | OUTPATIENT
Start: 2023-01-22 | End: 2023-01-27 | Stop reason: HOSPADM

## 2023-01-22 RX ORDER — SODIUM CHLORIDE 9 MG/ML
INJECTION, SOLUTION INTRAVENOUS PRN
Status: DISCONTINUED | OUTPATIENT
Start: 2023-01-22 | End: 2023-01-25 | Stop reason: SDUPTHER

## 2023-01-22 RX ORDER — ONDANSETRON 2 MG/ML
4 INJECTION INTRAMUSCULAR; INTRAVENOUS ONCE
Status: COMPLETED | OUTPATIENT
Start: 2023-01-22 | End: 2023-01-22

## 2023-01-22 RX ORDER — PREDNISONE 20 MG/1
50 TABLET ORAL EVERY 6 HOURS
Status: DISCONTINUED | OUTPATIENT
Start: 2023-01-22 | End: 2023-01-22

## 2023-01-22 RX ORDER — ONDANSETRON 4 MG/1
4 TABLET, ORALLY DISINTEGRATING ORAL EVERY 8 HOURS PRN
Status: DISCONTINUED | OUTPATIENT
Start: 2023-01-22 | End: 2023-01-27 | Stop reason: HOSPADM

## 2023-01-22 RX ORDER — ALBUTEROL SULFATE 2.5 MG/3ML
2.5 SOLUTION RESPIRATORY (INHALATION)
Status: DISCONTINUED | OUTPATIENT
Start: 2023-01-22 | End: 2023-01-27 | Stop reason: HOSPADM

## 2023-01-22 RX ORDER — POTASSIUM CHLORIDE 29.8 MG/ML
20 INJECTION INTRAVENOUS PRN
Status: DISCONTINUED | OUTPATIENT
Start: 2023-01-22 | End: 2023-01-24

## 2023-01-22 RX ORDER — DEXTROSE MONOHYDRATE 100 MG/ML
INJECTION, SOLUTION INTRAVENOUS CONTINUOUS PRN
Status: DISCONTINUED | OUTPATIENT
Start: 2023-01-22 | End: 2023-01-27 | Stop reason: HOSPADM

## 2023-01-22 RX ORDER — PANTOPRAZOLE SODIUM 40 MG/10ML
80 INJECTION, POWDER, LYOPHILIZED, FOR SOLUTION INTRAVENOUS ONCE
Status: COMPLETED | OUTPATIENT
Start: 2023-01-22 | End: 2023-01-22

## 2023-01-22 RX ORDER — POLYETHYLENE GLYCOL 3350 17 G/17G
17 POWDER, FOR SOLUTION ORAL DAILY PRN
Status: DISCONTINUED | OUTPATIENT
Start: 2023-01-22 | End: 2023-01-27 | Stop reason: HOSPADM

## 2023-01-22 RX ORDER — LEVOTHYROXINE SODIUM 88 UG/1
88 TABLET ORAL DAILY
Status: CANCELLED | OUTPATIENT
Start: 2023-01-23

## 2023-01-22 RX ORDER — SODIUM CHLORIDE 9 MG/ML
INJECTION, SOLUTION INTRAVENOUS CONTINUOUS
Status: DISCONTINUED | OUTPATIENT
Start: 2023-01-22 | End: 2023-01-24

## 2023-01-22 RX ORDER — SODIUM CHLORIDE 0.9 % (FLUSH) 0.9 %
5-40 SYRINGE (ML) INJECTION EVERY 12 HOURS SCHEDULED
Status: DISCONTINUED | OUTPATIENT
Start: 2023-01-22 | End: 2023-01-27 | Stop reason: HOSPADM

## 2023-01-22 RX ADMIN — WATER 1000 MG: 1 INJECTION INTRAMUSCULAR; INTRAVENOUS; SUBCUTANEOUS at 20:58

## 2023-01-22 RX ADMIN — ONDANSETRON 4 MG: 2 INJECTION INTRAMUSCULAR; INTRAVENOUS at 16:36

## 2023-01-22 RX ADMIN — OCTREOTIDE ACETATE 25 MCG/HR: 500 INJECTION, SOLUTION INTRAVENOUS; SUBCUTANEOUS at 18:45

## 2023-01-22 RX ADMIN — SODIUM CHLORIDE 1000 ML: 9 INJECTION, SOLUTION INTRAVENOUS at 16:33

## 2023-01-22 RX ADMIN — PANTOPRAZOLE SODIUM 80 MG: 40 INJECTION, POWDER, FOR SOLUTION INTRAVENOUS at 16:36

## 2023-01-22 RX ADMIN — SODIUM CHLORIDE 8 MG/HR: 9 INJECTION, SOLUTION INTRAVENOUS at 23:58

## 2023-01-22 RX ADMIN — OCTREOTIDE ACETATE 100 MCG: 100 INJECTION, SOLUTION INTRAVENOUS; SUBCUTANEOUS at 18:46

## 2023-01-22 RX ADMIN — IOPAMIDOL 75 ML: 755 INJECTION, SOLUTION INTRAVENOUS at 17:45

## 2023-01-22 ASSESSMENT — PAIN DESCRIPTION - DESCRIPTORS: DESCRIPTORS: DULL

## 2023-01-22 ASSESSMENT — PAIN DESCRIPTION - PAIN TYPE: TYPE: ACUTE PAIN

## 2023-01-22 ASSESSMENT — PAIN DESCRIPTION - ORIENTATION: ORIENTATION: LEFT

## 2023-01-22 ASSESSMENT — PAIN - FUNCTIONAL ASSESSMENT: PAIN_FUNCTIONAL_ASSESSMENT: 0-10

## 2023-01-22 ASSESSMENT — PAIN DESCRIPTION - FREQUENCY: FREQUENCY: CONTINUOUS

## 2023-01-22 ASSESSMENT — PAIN DESCRIPTION - LOCATION: LOCATION: ABDOMEN

## 2023-01-22 ASSESSMENT — PAIN SCALES - GENERAL: PAINLEVEL_OUTOF10: 5

## 2023-01-22 NOTE — ED PROVIDER NOTES
[address]        Pt Name: Claude Huffman  MRN: [id number]  [place] 1960  Date of evaluation: 1/22/2023  Provider: Mary Ortiz DO  PCP: Micki Duenas MD  Note Started: 3:55 PM EST 1/22/23    CHIEF COMPLAINT       Chief Complaint   Patient presents with    Emesis     Throwing up bright red blood this AM    Melena    Abdominal Pain       HISTORY OF PRESENT ILLNESS: 1 or more Elements   History From: patient    Limitations to history : None    Claude Huffman is a 57 y.o. female with PMH of liver cirrhosis, splenomegaly, and parotid cancer who presents with a complaint of hematemesis and black stool ongoing for 24 hours. Patient states that she is due to see a liver specialist this Wednesday to try and figure out why she has developed cirrhosis. She states that she vomited 2 times today, her first episode included several blood clots and her second episode was more of a bright red vomit. She states that she had 3 bowel movements since yesterday evening that appeared black. She currently admits to a dull left-sided abdominal pain that is been ongoing for over a week that she says is related to her splenomegaly. She states she has never been scoped or told that she has esophageal varices. She has no history of alcohol abuse. She currently is on Plavix. Chronic Conditions: liver cirrhosis, CVA    Nursing Notes were all reviewed and agreed with or any disagreements were addressed in the HPI.     REVIEW OF SYSTEMS :      Review of Systems    POSITIVE (+): hematemesis, nausea, abd pain   NEGATIVE (-): fevers, chills, diarrhea, constipation, dysphagia, chest pain, SOB    SURGICAL HISTORY     Past Surgical History:   Procedure Laterality Date    CHOLECYSTECTOMY         CURRENTMEDICATIONS       Previous Medications    ATORVASTATIN (LIPITOR) 40 MG TABLET    Take 40 mg by mouth daily    CLOPIDOGREL (PLAVIX) 75 MG TABLET        DULAGLUTIDE (TRULICITY) 1.5 AX/2.7LU SOPN    Inject 1.5 mg into the skin once a week    FERROUS SULFATE (IRON 325) 325 (65 FE) MG TABLET    Take 325 mg by mouth daily (with breakfast)    IRBESARTAN (AVAPRO) 150 MG TABLET        LEVOTHYROXINE (SYNTHROID) 88 MCG TABLET    Take 1 tablet Monday through Saturday, 1.5 tablets on Sunday    METFORMIN (GLUCOPHAGE) 1000 MG TABLET    Take 1 tablet by mouth 2 times daily (with meals)    PYRIDOXINE (B-6) 50 MG TABLET    Take 50 mg by mouth daily    TRESIBA FLEXTOUCH 100 UNIT/ML SOPN    Inject 48 units subcutaneous  at night       ALLERGIES     Shellfish-derived products, Bacitracin, Codeine, Penicillins, Azithromycin, Erythromycin, and Metoprolol    FAMILYHISTORY     History reviewed. No pertinent family history. SOCIAL HISTORY       Social History     Tobacco Use    Smoking status: Never    Smokeless tobacco: Never   Vaping Use    Vaping Use: Never used   Substance Use Topics    Alcohol use: Never       SCREENINGS        De Coma Scale  Eye Opening: Spontaneous  Best Verbal Response: Oriented  Best Motor Response: Obeys commands  De Coma Scale Score: 15                CIWA Assessment  BP: (!) 108/49  Heart Rate: 74           PHYSICAL EXAM  1 or more Elements     ED Triage Vitals [01/22/23 1550]   BP Temp Temp Source Heart Rate Resp SpO2 Height Weight   (!) 121/92 98.2 °F (36.8 °C) Oral 89 18 99 % 5' 10\" (1.778 m) 210 lb (95.3 kg)       Physical Exam  HENT:      Head: Normocephalic. Nose: No congestion. Eyes:      Pupils: Pupils are equal, round, and reactive to light. Cardiovascular:      Rate and Rhythm: Normal rate and regular rhythm. Pulses: Normal pulses. Pulmonary:      Effort: Pulmonary effort is normal. No respiratory distress. Breath sounds: No wheezing, rhonchi or rales. Abdominal:      Tenderness: There is abdominal tenderness.       Comments: Left sided abdominal pain    Musculoskeletal:      Cervical back: No rigidity. Right lower leg: No edema. Left lower leg: No edema. Skin:     General: Skin is warm. Neurological:      Mental Status: She is alert. DIAGNOSTIC RESULTS   LABS:    Labs Reviewed   CBC WITH AUTO DIFFERENTIAL - Abnormal; Notable for the following components:       Result Value    WBC 3.3 (*)     RBC 2.52 (*)     Hemoglobin 8.0 (*)     Hematocrit 23.5 (*)     Platelets 34 (*)     Lymphocytes % 13.5 (*)     Lymphocytes Absolute 0.44 (*)     Eosinophils Absolute 0.02 (*)     All other components within normal limits   BASIC METABOLIC PANEL - Abnormal; Notable for the following components:    Glucose 277 (*)     BUN 44 (*)     All other components within normal limits   HEPATIC FUNCTION PANEL - Abnormal; Notable for the following components:     Total Protein 5.9 (*)     All other components within normal limits   PROTIME-INR - Abnormal; Notable for the following components:    Protime 16.2 (*)     All other components within normal limits   SERUM DRUG SCREEN - Abnormal; Notable for the following components:    Acetaminophen Level <5.0 (*)     All other components within normal limits   URINALYSIS - Abnormal; Notable for the following components:    Glucose, Ur 250 (*)     Ketones, Urine TRACE (*)     Blood, Urine MODERATE (*)     Nitrite, Urine POSITIVE (*)     Leukocyte Esterase, Urine SMALL (*)     All other components within normal limits   AMMONIA - Abnormal; Notable for the following components:    Ammonia <10.0 (*)     All other components within normal limits   HEMOGLOBIN AND HEMATOCRIT - Abnormal; Notable for the following components:    Hemoglobin 6.6 (*)     Hematocrit 19.7 (*)     All other components within normal limits    Narrative:     Shelley Lewis tel. [phone],  Hematology results called to and read back by Dr. Amalia Min, 01/22/2023  18:15, by Justin Murray - Abnormal; Notable for the following components:    WBC, UA 10-20 (*)     RBC, UA 5-10 (*)     Bacteria, UA MANY (*)     All other components within normal limits   HEMOGLOBIN AND HEMATOCRIT - Abnormal; Notable for the following components:    Hemoglobin 7.7 (*)     Hematocrit 23.1 (*)     All other components within normal limits   APTT   LIPASE   PLATELET CONFIRMATION   SPECIMEN REJECTION    Narrative:     ORDER WAS CANCELLED 01/22/2023 17:39, Rejected: Collected in wrong  tube/container. CBC WITH AUTO DIFFERENTIAL   HEMOGLOBIN AND HEMATOCRIT   HEMOGLOBIN AND HEMATOCRIT   HEMOGLOBIN AND HEMATOCRIT   HEMOGLOBIN AND HEMATOCRIT   HEMOGLOBIN AND HEMATOCRIT   HEMOGLOBIN AND HEMATOCRIT   HEMOGLOBIN AND HEMATOCRIT   HEMOGLOBIN AND HEMATOCRIT   HEMOGLOBIN AND HEMATOCRIT   HEMOGLOBIN AND HEMATOCRIT   HEMOGLOBIN AND HEMATOCRIT   TYPE AND SCREEN   PREPARE RBC (CROSSMATCH)   PREPARE RBC (CROSSMATCH)       When ordered only abnormal lab results are displayed. All other labs were within normal range or not returned as of this dictation. RADIOLOGY:   Non-plain film images such as CT, Ultrasound and MRI are read by the radiologist. Plain radiographic images are visualized and preliminarily interpreted by the ED Provider with the below findings:    CT abd: splenomegaly, esophageal varices, portal hypertension    Interpretation per the Radiologist below, if available at the time of this note:    Discussed with Radiologist: no    CT ABDOMEN PELVIS W IV CONTRAST Additional Contrast? None   Final Result   Cirrhosis with portal hypertension manifesting as splenomegaly, ascending   colon wall thickening, small volume of ascites and collateral vessels   including esophageal varices. Diverticulosis without evidence of acute diverticulitis. No results found. No results found. PROCEDURES   Unless otherwise noted below, none     Procedures      PAST MEDICAL HISTORY      has a past medical history of Cerebral artery occlusion with cerebral infarction ([address] and Diabetes mellitus ([facility] [address]      EMERGENCY DEPARTMENT COURSE and DIFFERENTIAL DIAGNOSIS/MDM:   Vitals:    Vitals:    01/22/23 1907 01/22/23 1923 01/22/23 2023 01/22/23 2112   BP: (!) 112/51 (!) 110/48 (!) 116/55 (!) 108/49   Pulse: 78 78  74   Resp: 18 17 20   Temp: 98.1 °F (36.7 °C) 98.4 °F (36.9 °C) 98.2 °F (36.8 °C)    TempSrc: Oral Oral Oral    SpO2: 98% 94%  96%   Weight:       Height:           Patient was given the following medications:  Medications   0.9 % sodium chloride infusion (has no administration in time range)   octreotide (SANDOSTATIN) 500 mcg in sodium chloride 0.9 % 100 mL infusion (25 mcg/hr IntraVENous New Bag 1/22/23 1845)   sodium chloride flush 0.9 % injection 5-40 mL (has no administration in time range)   sodium chloride flush 0.9 % injection 5-40 mL (has no administration in time range)   0.9 % sodium chloride infusion (has no administration in time range)   ondansetron (ZOFRAN-ODT) disintegrating tablet 4 mg (has no administration in time range)     Or   ondansetron (ZOFRAN) injection 4 mg (has no administration in time range)   polyethylene glycol (GLYCOLAX) packet 17 g (has no administration in time range)   acetaminophen (TYLENOL) tablet 650 mg (has no administration in time range)     Or   acetaminophen (TYLENOL) suppository 650 mg (has no administration in time range)   potassium chloride 20 mEq/50 mL IVPB (Central Line) (has no administration in time range)     Or   potassium chloride 10 mEq/100 mL IVPB (Peripheral Line) (has no administration in time range)   albuterol (PROVENTIL) nebulizer solution 2.5 mg (has no administration in time range)   0.9 % sodium chloride infusion (has no administration in time range)   pantoprazole (PROTONIX) 80 mg in sodium chloride 0.9 % 100 mL infusion (has no administration in time range)   pantoprazole (PROTONIX) injection 80 mg (80 mg IntraVENous Given 1/22/23 1636)   ondansetron (ZOFRAN) injection 4 mg (4 mg IntraVENous Given 1/22/23 1636)   0.9 % sodium chloride bolus (0 mLs IntraVENous Stopped 1/22/23 1847)   iopamidol (ISOVUE-370) 76 % injection 75 mL (75 mLs IntraVENous Given 1/22/23 1745)   octreotide (SANDOSTATIN) injection 100 mcg (100 mcg SubCUTAneous Given 1/22/23 1846)   cefTRIAXone (ROCEPHIN) 1,000 mg in sterile water 10 mL IV syringe (1,000 mg IntraVENous Given 1/22/23 2058)       ED Course as of 01/22/23 2126   Sun Jan 22, 2023   1654 Patient reports no contrast allergy. Will discontinue meds for contrast allergy protection. CT to be notified. [CF]   1833 Reevaluated patient who experienced another black bloody bowel movement. Repeat hemoglobin is 6.6 will consult surgery and admit patient. [CB]   1837 Last pressure 057 systolic. Patient is not tachycardic. [CF]   [phone] with Dr. Rain Cool who agreed with our treatment plan and admission and he will see her on the floor. [CB]   1847 Spoke with Dr. Song Klein of hospitalist.  She and I reviewed patient's chart showing her history of concern for portal hypertension and possible esophageal varices. Given her drop in hemoglobin that is fairly precipitous she strongly recommends we speak with gastroenterology about emergent GI scope given concern for varices and need for transfusion at this point. She will like us to hold off on an admission until we have a clear plan on patient's goals of care. [CF]   1905 Spoke with Dr. Marcos Siddiqui of GI who states he agrees with plan of starting octreotide transfusion and admission to the ICU. He will see the patient in the morning. I did explain to the patient that the admitting physician was requesting an emergent EGD he states he will see the patient in the morning. Awaiting consult from critical care [CF]   1945 I did speak with Dr. Michael Atwood of critical care who is agreed to accept the patient to the ICU.   However when I reconsulted the admitting hospitalist Dr. Song Klein she again refuses to admit the patient due the feeling that she the patient needs an emergent EGD which is not going to happen in our department or in her hospital [facility]. Plan to consult [facility] for further discussion of patient's care [CF]   1952 I spoke with the director of medicine Dr. Greyson Lieberman. He feels patient should be admitted to the ICU. He does recommend reaching out to Dr. Ludwig Mccracken touch base of the she is okay with the patient being in the ICU at this time. He is aware that there is a covering midlevel in the ICU if something were to go right and they can manage the patient with transfusions. He states if they are willing to contact the hospitalist explain the situation and if any other further issues come up to contact Dr. Greyson Lieberman back. [CF]   1956 Spoke with intensive care physician Dr. Ludwig Mccracken she is agreed to accept the patient to the ICU if we can find a separate coverage for the admitting [CF]   2012 I spoke with the on-call hospitalist who wanted to reach out to their  for discussion of whether they will be willing to admit the patient. [CF]   2027 I have spoken with the hospitalist they have agreed to accept the patient for admission [facility] delay transfer the patient to Dr. Lottie Galarza service in the morning [CF]   2029 Hospitalist did speak with me again and they have agreed to admit the patient to their service. I again of run patient by the nurse practitioner who is in the ICU tami Gustafson who was comfortable managing the patient. Patient will be admitted to the ICU for upper GI bleed [CF]   2032 Patient reevaluated she is resting comfortably in the room normotensive nontachycardic. She has had no episodes of nausea or vomiting in the department. She had 1 rectal bleeding episode in the department.  [CF]   2123 Hemoglobin Quant(!): 7.7 [CF]      ED Course User Index  [CB] Mary Ortiz DO  [CF] Nadira Pineda DO          CC/HPI Summary, DDx, ED Course, and Reassessment:  Claude Huffman is a 57 y.o. female with PMH of liver cirrhosis, splenomegaly, and parotid cancer who presents with a complaint of hematemesis and black stool ongoing for 24 hours. Vital stable on arrival and patient in no acute distress. Exam revealed LUQ tenderness with normal pulses, lung sounds. No signs of epistaxis. DDx includes upper GI bleed secondary to esophageal varices, gastritis, gastric ulcer. Patient given Zofran and and Protonix IV on arrival and experienced relief of nausea. She was also given 1 L normal saline on arrival. Initial labs indicated a hemoglobin of 8.0, WBC of 3.3, PT/INR of 16.2, she also had a positive microscopic UA although she is denying dysuria. During her stay she experienced another episode of melena. Repeat H&H indicated a hemoglobin of 6.6 and her blood pressure remained stable, she was given octreotide and 2 units of blood. Also given 1 gram rocephin IV for UTI. CT abd revealed cirrhosis and portal hypertension with splenomegaly and esophageal varices. Her PCP refused to admit the patient without an emergent scope so we reached out to GI specialist Dr. Poppy Falk who stated that he could do it in the morning but not tonight. Dr. Jordi Mejía with the ICU agreed to accept the patient to the ICU. We reconsulted PCP Dr. Nikki Hilton who again was uncomfortable with admitting without scope tonight. A call was made to the medical director Dr. Kaylee Osborne who felt the patient should still go to the ICU and be admitted to await a scope in the morning since she would be under their constat care as an NP will be present throughout the night.  Ultimately She was admitted under the hospitalist.       CONSULTS: (Who and What was discussed)  Marva Osborne - medical director  Dr. Nikki Hilton - PCP  [facility] hospitalist  Dr. Jordi Mejía - ICU  Dr. Shelley Duong - Surgery    Social Determinants : None      Records Reviewed (source and summary): prior admissions    Disposition Considerations (include 1 Tests not done, Admit vs D/C, Shared Decision Making, Pt Expectation of Test or Tx.): none      I am the Primary Clinician of Record. FINAL IMPRESSION      1. Upper GI bleed          DISPOSITION/PLAN     DISPOSITION Admitted 01/22/2023 08:44:29 PM      PATIENT REFERRED TO:  No follow-up provider specified.     DISCHARGE MEDICATIONS:  New Prescriptions    No medications on file       DISCONTINUED MEDICATIONS:  Discontinued Medications    No medications on file              (Please note that portions of this note were completed with a voice recognition program.  Efforts were made to edit the dictations but occasionally words are mis-transcribed.)    Peggy Eastman DO (electronically signed)           Peggy Eastman DO  Resident  01/22/23 2126       Peggy Eastman DO  Resident  01/22/23 2126

## 2023-01-22 NOTE — PROGRESS NOTES
Called by Dr. Dora Lopes due to acute blood loss anemia with history of crytogenic cirrhosis. Patient has dropped 2 units of hb in ER in 1 hour. Has splenomegaly on last US in [address] and portal hypertension on imaging reported by Dr. Dora Lopes. Concern over esophageal variceal bleeding. Patient on Octreotide infusion and PPI injection. Explained that if GI not willing to come in and do emergent scope will need to go to facility that can preform for possible banding. Dr. Droa Lopes has call out to Dr. J Carlos Best. Recommended if not able to contact Dr. Florentin Singh and transfer to [facility]. Dr. Dora Lopes again contacted as ICU willing to monitor but GI not willing to come in for emergent scope.  Admission declined and suggested transfer to facility for emergent banding with GI coverage that will come in.     Timur Lucio, DO

## 2023-01-23 ENCOUNTER — APPOINTMENT (OUTPATIENT)
Dept: GENERAL RADIOLOGY | Age: 63
End: 2023-01-23
Payer: MEDICARE

## 2023-01-23 ENCOUNTER — ANESTHESIA EVENT (OUTPATIENT)
Dept: ENDOSCOPY | Age: 63
End: 2023-01-23
Payer: MEDICARE

## 2023-01-23 ENCOUNTER — ANESTHESIA (OUTPATIENT)
Dept: ENDOSCOPY | Age: 63
End: 2023-01-23
Payer: MEDICARE

## 2023-01-23 PROBLEM — D61.818 PANCYTOPENIA (HCC): Status: ACTIVE | Noted: 2023-01-23

## 2023-01-23 PROBLEM — D69.3 CHRONIC ITP (IDIOPATHIC THROMBOCYTOPENIC PURPURA) (HCC): Status: ACTIVE | Noted: 2023-01-23

## 2023-01-23 PROBLEM — D62 ACUTE BLOOD LOSS ANEMIA: Status: ACTIVE | Noted: 2023-01-23

## 2023-01-23 PROBLEM — Z86.2 HISTORY OF ITP: Status: ACTIVE | Noted: 2023-01-23

## 2023-01-23 PROBLEM — K74.69 OTHER CIRRHOSIS OF LIVER (HCC): Status: ACTIVE | Noted: 2023-01-23

## 2023-01-23 PROBLEM — D75.839 THROMBOCYTHEMIA: Status: ACTIVE | Noted: 2023-01-23

## 2023-01-23 LAB
ALBUMIN SERPL-MCNC: 3.5 G/DL (ref 3.5–5.2)
ALP BLD-CCNC: 43 U/L (ref 35–104)
ALT SERPL-CCNC: 20 U/L (ref 0–32)
ANION GAP SERPL CALCULATED.3IONS-SCNC: 8 MMOL/L (ref 7–16)
AST SERPL-CCNC: 19 U/L (ref 0–31)
BASOPHILS ABSOLUTE: 0.01 E9/L (ref 0–0.2)
BASOPHILS RELATIVE PERCENT: 0.4 % (ref 0–2)
BILIRUB SERPL-MCNC: 0.8 MG/DL (ref 0–1.2)
BLOOD BANK DISPENSE STATUS: NORMAL
BLOOD BANK DISPENSE STATUS: NORMAL
BLOOD BANK PRODUCT CODE: NORMAL
BLOOD BANK PRODUCT CODE: NORMAL
BPU ID: NORMAL
BPU ID: NORMAL
BUN BLDV-MCNC: 36 MG/DL (ref 6–23)
CALCIUM SERPL-MCNC: 9 MG/DL (ref 8.6–10.2)
CEA: 2.1 NG/ML (ref 0–5.2)
CHLORIDE BLD-SCNC: 108 MMOL/L (ref 98–107)
CHOLESTEROL, FASTING: 100 MG/DL (ref 0–199)
CO2: 23 MMOL/L (ref 22–29)
CREAT SERPL-MCNC: 0.9 MG/DL (ref 0.5–1)
DESCRIPTION BLOOD BANK: NORMAL
DESCRIPTION BLOOD BANK: NORMAL
EKG ATRIAL RATE: 70 BPM
EKG P AXIS: 0 DEGREES
EKG P-R INTERVAL: 128 MS
EKG Q-T INTERVAL: 428 MS
EKG QRS DURATION: 86 MS
EKG QTC CALCULATION (BAZETT): 462 MS
EKG R AXIS: 0 DEGREES
EKG T AXIS: 2 DEGREES
EKG VENTRICULAR RATE: 70 BPM
EOSINOPHILS ABSOLUTE: 0.01 E9/L (ref 0.05–0.5)
EOSINOPHILS RELATIVE PERCENT: 0.4 % (ref 0–6)
FERRITIN: 110 NG/ML
GFR SERPL CREATININE-BSD FRML MDRD: >60 ML/MIN/1.73
GLUCOSE BLD-MCNC: 185 MG/DL (ref 74–99)
HCT VFR BLD CALC: 21.7 % (ref 34–48)
HCT VFR BLD CALC: 23 % (ref 34–48)
HCT VFR BLD CALC: 23.2 % (ref 34–48)
HCT VFR BLD CALC: 23.3 % (ref 34–48)
HCT VFR BLD CALC: 23.5 % (ref 34–48)
HDLC SERPL-MCNC: 32 MG/DL
HEMOGLOBIN: 7.3 G/DL (ref 11.5–15.5)
HEMOGLOBIN: 7.6 G/DL (ref 11.5–15.5)
HEMOGLOBIN: 7.6 G/DL (ref 11.5–15.5)
HEMOGLOBIN: 7.7 G/DL (ref 11.5–15.5)
HEMOGLOBIN: 7.8 G/DL (ref 11.5–15.5)
HYPOCHROMIA: ABNORMAL
IMMATURE GRANULOCYTES #: 0.01 E9/L
IMMATURE GRANULOCYTES %: 0.4 % (ref 0–5)
INR BLD: 1.4
IRON SATURATION: 37 % (ref 15–50)
IRON: 100 MCG/DL (ref 37–145)
LACTIC ACID: 1.7 MMOL/L (ref 0.5–2.2)
LDL CHOLESTEROL CALCULATED: 47 MG/DL (ref 0–99)
LYMPHOCYTES ABSOLUTE: 0.29 E9/L (ref 1.5–4)
LYMPHOCYTES RELATIVE PERCENT: 12.7 % (ref 20–42)
MAGNESIUM: 1.3 MG/DL (ref 1.6–2.6)
MAGNESIUM: 1.7 MG/DL (ref 1.6–2.6)
MCH RBC QN AUTO: 29.8 PG (ref 26–35)
MCH RBC QN AUTO: 30.1 PG (ref 26–35)
MCHC RBC AUTO-ENTMCNC: 32.6 % (ref 32–34.5)
MCHC RBC AUTO-ENTMCNC: 33.2 % (ref 32–34.5)
MCV RBC AUTO: 90.7 FL (ref 80–99.9)
MCV RBC AUTO: 91.4 FL (ref 80–99.9)
METER GLUCOSE: 169 MG/DL (ref 74–99)
METER GLUCOSE: 291 MG/DL (ref 74–99)
METER GLUCOSE: 295 MG/DL (ref 74–99)
METER GLUCOSE: 301 MG/DL (ref 74–99)
MONOCYTES ABSOLUTE: 0.1 E9/L (ref 0.1–0.95)
MONOCYTES RELATIVE PERCENT: 4.4 % (ref 2–12)
NEUTROPHILS ABSOLUTE: 1.87 E9/L (ref 1.8–7.3)
NEUTROPHILS RELATIVE PERCENT: 81.7 % (ref 43–80)
OVALOCYTES: ABNORMAL
PDW BLD-RTO: 14.6 FL (ref 11.5–15)
PDW BLD-RTO: 14.7 FL (ref 11.5–15)
PHOSPHORUS: 2.8 MG/DL (ref 2.5–4.5)
PLATELET # BLD: 24 E9/L (ref 130–450)
PLATELET # BLD: 26 E9/L (ref 130–450)
PLATELET CONFIRMATION: NORMAL
PLATELET CONFIRMATION: NORMAL
PMV BLD AUTO: 11.8 FL (ref 7–12)
PMV BLD AUTO: 12.7 FL (ref 7–12)
POIKILOCYTES: ABNORMAL
POTASSIUM SERPL-SCNC: 4.4 MMOL/L (ref 3.5–5)
PROCALCITONIN: 0.08 NG/ML (ref 0–0.08)
PROTHROMBIN TIME: 16.1 SEC (ref 9.3–12.4)
RBC # BLD: 2.55 E12/L (ref 3.5–5.5)
RBC # BLD: 2.59 E12/L (ref 3.5–5.5)
SODIUM BLD-SCNC: 139 MMOL/L (ref 132–146)
T4 TOTAL: 7.7 MCG/DL (ref 4.5–11.7)
TARGET CELLS: ABNORMAL
TOTAL IRON BINDING CAPACITY: 272 MCG/DL (ref 250–450)
TOTAL PROTEIN: 5.4 G/DL (ref 6.4–8.3)
TRIGLYCERIDE, FASTING: 103 MG/DL (ref 0–149)
TROPONIN, HIGH SENSITIVITY: 11 NG/L (ref 0–9)
TSH SERPL DL<=0.05 MIU/L-ACNC: 0.71 UIU/ML (ref 0.27–4.2)
VLDLC SERPL CALC-MCNC: 21 MG/DL
WBC # BLD: 1.8 E9/L (ref 4.5–11.5)
WBC # BLD: 2.3 E9/L (ref 4.5–11.5)

## 2023-01-23 PROCEDURE — 2720000010 HC SURG SUPPLY STERILE: Performed by: INTERNAL MEDICINE

## 2023-01-23 PROCEDURE — 85027 COMPLETE CBC AUTOMATED: CPT

## 2023-01-23 PROCEDURE — 82105 ALPHA-FETOPROTEIN SERUM: CPT

## 2023-01-23 PROCEDURE — 84443 ASSAY THYROID STIM HORMONE: CPT

## 2023-01-23 PROCEDURE — 93010 ELECTROCARDIOGRAM REPORT: CPT | Performed by: INTERNAL MEDICINE

## 2023-01-23 PROCEDURE — 83605 ASSAY OF LACTIC ACID: CPT

## 2023-01-23 PROCEDURE — 84100 ASSAY OF PHOSPHORUS: CPT

## 2023-01-23 PROCEDURE — 86255 FLUORESCENT ANTIBODY SCREEN: CPT

## 2023-01-23 PROCEDURE — 83550 IRON BINDING TEST: CPT

## 2023-01-23 PROCEDURE — 85018 HEMOGLOBIN: CPT

## 2023-01-23 PROCEDURE — 6360000002 HC RX W HCPCS

## 2023-01-23 PROCEDURE — 6370000000 HC RX 637 (ALT 250 FOR IP): Performed by: NURSE PRACTITIONER

## 2023-01-23 PROCEDURE — C9113 INJ PANTOPRAZOLE SODIUM, VIA: HCPCS | Performed by: NURSE PRACTITIONER

## 2023-01-23 PROCEDURE — 82378 CARCINOEMBRYONIC ANTIGEN: CPT

## 2023-01-23 PROCEDURE — 84484 ASSAY OF TROPONIN QUANT: CPT

## 2023-01-23 PROCEDURE — 82784 ASSAY IGA/IGD/IGG/IGM EACH: CPT

## 2023-01-23 PROCEDURE — 2000000000 HC ICU R&B

## 2023-01-23 PROCEDURE — 84436 ASSAY OF TOTAL THYROXINE: CPT

## 2023-01-23 PROCEDURE — 6370000000 HC RX 637 (ALT 250 FOR IP): Performed by: STUDENT IN AN ORGANIZED HEALTH CARE EDUCATION/TRAINING PROGRAM

## 2023-01-23 PROCEDURE — 06L38CZ OCCLUSION OF ESOPHAGEAL VEIN WITH EXTRALUMINAL DEVICE, VIA NATURAL OR ARTIFICIAL OPENING ENDOSCOPIC: ICD-10-PCS | Performed by: INTERNAL MEDICINE

## 2023-01-23 PROCEDURE — 80074 ACUTE HEPATITIS PANEL: CPT

## 2023-01-23 PROCEDURE — 3700000000 HC ANESTHESIA ATTENDED CARE: Performed by: INTERNAL MEDICINE

## 2023-01-23 PROCEDURE — 3700000001 HC ADD 15 MINUTES (ANESTHESIA): Performed by: INTERNAL MEDICINE

## 2023-01-23 PROCEDURE — 85025 COMPLETE CBC W/AUTO DIFF WBC: CPT

## 2023-01-23 PROCEDURE — 6360000002 HC RX W HCPCS: Performed by: STUDENT IN AN ORGANIZED HEALTH CARE EDUCATION/TRAINING PROGRAM

## 2023-01-23 PROCEDURE — 3609012300 HC EGD BAND LIGATION ESOPHGEAL/GASTRIC VARICES: Performed by: INTERNAL MEDICINE

## 2023-01-23 PROCEDURE — 2580000003 HC RX 258: Performed by: NURSE PRACTITIONER

## 2023-01-23 PROCEDURE — 2580000003 HC RX 258: Performed by: NURSE ANESTHETIST, CERTIFIED REGISTERED

## 2023-01-23 PROCEDURE — 71045 X-RAY EXAM CHEST 1 VIEW: CPT

## 2023-01-23 PROCEDURE — 82728 ASSAY OF FERRITIN: CPT

## 2023-01-23 PROCEDURE — 2580000003 HC RX 258: Performed by: STUDENT IN AN ORGANIZED HEALTH CARE EDUCATION/TRAINING PROGRAM

## 2023-01-23 PROCEDURE — 36430 TRANSFUSION BLD/BLD COMPNT: CPT

## 2023-01-23 PROCEDURE — 0DB68ZX EXCISION OF STOMACH, VIA NATURAL OR ARTIFICIAL OPENING ENDOSCOPIC, DIAGNOSTIC: ICD-10-PCS | Performed by: INTERNAL MEDICINE

## 2023-01-23 PROCEDURE — 80061 LIPID PANEL: CPT

## 2023-01-23 PROCEDURE — 2709999900 HC NON-CHARGEABLE SUPPLY: Performed by: INTERNAL MEDICINE

## 2023-01-23 PROCEDURE — 82103 ALPHA-1-ANTITRYPSIN TOTAL: CPT

## 2023-01-23 PROCEDURE — 36415 COLL VENOUS BLD VENIPUNCTURE: CPT

## 2023-01-23 PROCEDURE — 82787 IGG 1 2 3 OR 4 EACH: CPT

## 2023-01-23 PROCEDURE — 99232 SBSQ HOSP IP/OBS MODERATE 35: CPT | Performed by: STUDENT IN AN ORGANIZED HEALTH CARE EDUCATION/TRAINING PROGRAM

## 2023-01-23 PROCEDURE — 83735 ASSAY OF MAGNESIUM: CPT

## 2023-01-23 PROCEDURE — 82962 GLUCOSE BLOOD TEST: CPT

## 2023-01-23 PROCEDURE — 80053 COMPREHEN METABOLIC PANEL: CPT

## 2023-01-23 PROCEDURE — 85610 PROTHROMBIN TIME: CPT

## 2023-01-23 PROCEDURE — 6360000002 HC RX W HCPCS: Performed by: INTERNAL MEDICINE

## 2023-01-23 PROCEDURE — 85014 HEMATOCRIT: CPT

## 2023-01-23 PROCEDURE — A4216 STERILE WATER/SALINE, 10 ML: HCPCS | Performed by: NURSE PRACTITIONER

## 2023-01-23 PROCEDURE — 6360000002 HC RX W HCPCS: Performed by: NURSE ANESTHETIST, CERTIFIED REGISTERED

## 2023-01-23 PROCEDURE — 86038 ANTINUCLEAR ANTIBODIES: CPT

## 2023-01-23 PROCEDURE — 2580000003 HC RX 258

## 2023-01-23 PROCEDURE — 83540 ASSAY OF IRON: CPT

## 2023-01-23 PROCEDURE — 6370000000 HC RX 637 (ALT 250 FOR IP)

## 2023-01-23 PROCEDURE — 6360000002 HC RX W HCPCS: Performed by: NURSE PRACTITIONER

## 2023-01-23 PROCEDURE — 87040 BLOOD CULTURE FOR BACTERIA: CPT

## 2023-01-23 RX ORDER — NADOLOL 20 MG/1
20 TABLET ORAL DAILY
Status: DISCONTINUED | OUTPATIENT
Start: 2023-01-23 | End: 2023-01-27 | Stop reason: HOSPADM

## 2023-01-23 RX ORDER — MAGNESIUM SULFATE IN WATER 40 MG/ML
2000 INJECTION, SOLUTION INTRAVENOUS ONCE
Status: COMPLETED | OUTPATIENT
Start: 2023-01-23 | End: 2023-01-23

## 2023-01-23 RX ORDER — SODIUM CHLORIDE 9 MG/ML
INJECTION, SOLUTION INTRAVENOUS CONTINUOUS PRN
Status: DISCONTINUED | OUTPATIENT
Start: 2023-01-23 | End: 2023-01-23 | Stop reason: SDUPTHER

## 2023-01-23 RX ORDER — METHYLPREDNISOLONE SODIUM SUCCINATE 125 MG/2ML
60 INJECTION, POWDER, LYOPHILIZED, FOR SOLUTION INTRAMUSCULAR; INTRAVENOUS DAILY
Status: DISCONTINUED | OUTPATIENT
Start: 2023-01-23 | End: 2023-01-23

## 2023-01-23 RX ORDER — LEVOTHYROXINE SODIUM 88 UG/1
88 TABLET ORAL DAILY
Status: DISCONTINUED | OUTPATIENT
Start: 2023-01-23 | End: 2023-01-27 | Stop reason: HOSPADM

## 2023-01-23 RX ORDER — SODIUM CHLORIDE 9 MG/ML
INJECTION, SOLUTION INTRAVENOUS PRN
Status: DISCONTINUED | OUTPATIENT
Start: 2023-01-23 | End: 2023-01-25 | Stop reason: SDUPTHER

## 2023-01-23 RX ORDER — DEXAMETHASONE 4 MG/1
40 TABLET ORAL DAILY
Status: COMPLETED | OUTPATIENT
Start: 2023-01-23 | End: 2023-01-26

## 2023-01-23 RX ORDER — ATORVASTATIN CALCIUM 40 MG/1
40 TABLET, FILM COATED ORAL DAILY
Status: DISCONTINUED | OUTPATIENT
Start: 2023-01-23 | End: 2023-01-27 | Stop reason: HOSPADM

## 2023-01-23 RX ORDER — PROPOFOL 10 MG/ML
INJECTION, EMULSION INTRAVENOUS PRN
Status: DISCONTINUED | OUTPATIENT
Start: 2023-01-23 | End: 2023-01-23 | Stop reason: SDUPTHER

## 2023-01-23 RX ADMIN — PROPOFOL 25 MG: 10 INJECTION, EMULSION INTRAVENOUS at 15:10

## 2023-01-23 RX ADMIN — SODIUM CHLORIDE: 9 INJECTION, SOLUTION INTRAVENOUS at 00:46

## 2023-01-23 RX ADMIN — WATER 1000 MG: 1 INJECTION INTRAMUSCULAR; INTRAVENOUS; SUBCUTANEOUS at 20:30

## 2023-01-23 RX ADMIN — PROPOFOL 25 MG: 10 INJECTION, EMULSION INTRAVENOUS at 15:16

## 2023-01-23 RX ADMIN — OCTREOTIDE ACETATE 50 MCG/HR: 500 INJECTION, SOLUTION INTRAVENOUS; SUBCUTANEOUS at 18:23

## 2023-01-23 RX ADMIN — NADOLOL 20 MG: 20 TABLET ORAL at 18:19

## 2023-01-23 RX ADMIN — SODIUM CHLORIDE, PRESERVATIVE FREE 40 MG: 5 INJECTION INTRAVENOUS at 10:22

## 2023-01-23 RX ADMIN — SODIUM CHLORIDE: 9 INJECTION, SOLUTION INTRAVENOUS at 18:20

## 2023-01-23 RX ADMIN — INSULIN LISPRO 2 UNITS: 100 INJECTION, SOLUTION INTRAVENOUS; SUBCUTANEOUS at 12:20

## 2023-01-23 RX ADMIN — PREDNISONE 80 MG: 20 TABLET ORAL at 00:26

## 2023-01-23 RX ADMIN — SODIUM CHLORIDE, PRESERVATIVE FREE 10 ML: 5 INJECTION INTRAVENOUS at 19:56

## 2023-01-23 RX ADMIN — PROPOFOL 50 MG: 10 INJECTION, EMULSION INTRAVENOUS at 15:09

## 2023-01-23 RX ADMIN — PROPOFOL 25 MG: 10 INJECTION, EMULSION INTRAVENOUS at 15:14

## 2023-01-23 RX ADMIN — DEXAMETHASONE 40 MG: 4 TABLET ORAL at 17:54

## 2023-01-23 RX ADMIN — SODIUM CHLORIDE: 9 INJECTION, SOLUTION INTRAVENOUS at 14:37

## 2023-01-23 RX ADMIN — SODIUM CHLORIDE, PRESERVATIVE FREE 40 MG: 5 INJECTION INTRAVENOUS at 21:37

## 2023-01-23 RX ADMIN — PROPOFOL 25 MG: 10 INJECTION, EMULSION INTRAVENOUS at 15:11

## 2023-01-23 RX ADMIN — PROPOFOL 50 MG: 10 INJECTION, EMULSION INTRAVENOUS at 15:08

## 2023-01-23 RX ADMIN — SODIUM CHLORIDE: 9 INJECTION, SOLUTION INTRAVENOUS at 09:34

## 2023-01-23 RX ADMIN — ONDANSETRON 4 MG: 2 INJECTION INTRAMUSCULAR; INTRAVENOUS at 10:22

## 2023-01-23 RX ADMIN — METHYLPREDNISOLONE SODIUM SUCCINATE 60 MG: 125 INJECTION, POWDER, LYOPHILIZED, FOR SOLUTION INTRAMUSCULAR; INTRAVENOUS at 10:21

## 2023-01-23 RX ADMIN — INSULIN LISPRO 3 UNITS: 100 INJECTION, SOLUTION INTRAVENOUS; SUBCUTANEOUS at 23:09

## 2023-01-23 RX ADMIN — MAGNESIUM SULFATE HEPTAHYDRATE 2000 MG: 40 INJECTION, SOLUTION INTRAVENOUS at 06:23

## 2023-01-23 RX ADMIN — OCTREOTIDE ACETATE 50 MCG/HR: 500 INJECTION, SOLUTION INTRAVENOUS; SUBCUTANEOUS at 07:31

## 2023-01-23 RX ADMIN — PROPOFOL 25 MG: 10 INJECTION, EMULSION INTRAVENOUS at 15:12

## 2023-01-23 RX ADMIN — ATORVASTATIN CALCIUM 40 MG: 40 TABLET, FILM COATED ORAL at 19:41

## 2023-01-23 RX ADMIN — LEVOTHYROXINE SODIUM 88 MCG: 0.09 TABLET ORAL at 06:18

## 2023-01-23 RX ADMIN — SODIUM CHLORIDE, PRESERVATIVE FREE 10 ML: 5 INJECTION INTRAVENOUS at 00:44

## 2023-01-23 RX ADMIN — SODIUM CHLORIDE, PRESERVATIVE FREE 10 ML: 5 INJECTION INTRAVENOUS at 09:35

## 2023-01-23 RX ADMIN — OCTREOTIDE ACETATE 50 MCG/HR: 500 INJECTION, SOLUTION INTRAVENOUS; SUBCUTANEOUS at 00:47

## 2023-01-23 RX ADMIN — INSULIN LISPRO 2 UNITS: 100 INJECTION, SOLUTION INTRAVENOUS; SUBCUTANEOUS at 18:02

## 2023-01-23 ASSESSMENT — PAIN DESCRIPTION - LOCATION
LOCATION: ABDOMEN
LOCATION: ABDOMEN

## 2023-01-23 ASSESSMENT — PAIN DESCRIPTION - ORIENTATION
ORIENTATION: RIGHT;MID

## 2023-01-23 ASSESSMENT — PAIN SCALES - GENERAL
PAINLEVEL_OUTOF10: 5
PAINLEVEL_OUTOF10: 4
PAINLEVEL_OUTOF10: 0
PAINLEVEL_OUTOF10: 4

## 2023-01-23 ASSESSMENT — PAIN DESCRIPTION - DESCRIPTORS
DESCRIPTORS: ACHING;SORE;TENDER
DESCRIPTORS: SORE
DESCRIPTORS: ACHING;SORE;TENDER

## 2023-01-23 NOTE — CARE COORDINATION
Social Work:    Reviewed chart notes. Recent diagnosis of cirrhotic liver in [place]. History of ITP, diabetes type 2, splenomegaly, HTN, hypothyroidism, parotid cancer, stroke (on Plavix), admitted due to hematemesis from home and is being treated for esophageal bleeding, anemia, presently on Sandostatin & Protonix drip, EGD with possible banding per gastroenterology note. Social service met with Simona Hillman and her  Kyung Valerio at bedside. Social service advised the couple about social service &  roles, as well as discussed discharge planning. Simona Hillman is a patient of Dr. Anthony Bella and uses [facility] in [facility]. She resides in a 2-story home with 3 entry steps. Her bedroom is located upstairs, & full-bathroom on the main floor. Simona Hillman was independent with ADL's prior to admission and uses a glucometer. She also has a walker, cane, BSC, if needed. Simona Hillman has never had a need to use home care or skilled rehab. She expects to return home and has no present needs but she & Ray are receptive to any recommendations closer to discharge. Social work &  to follow.     Electronically signed by ABBY Aviles on 1/23/2023 at 1:02 PM

## 2023-01-23 NOTE — H&P
[facility] History and Physical      CHIEF COMPLAINT: Hematemesis    History of Present Illness: 29-year-old female with a past medical history of ITP, liver cirrhosis, splenomegaly, parotid cancer presents emergency department for hematemesis and black stools for 24 hours. Patient states that she was recently diagnosed with a cirrhotic liver in [facility]. She does admit to black bowel movements x3. She also endorses left-sided abdominal pain. She denies any Tylenol or alcohol use. She is not on any blood thinners. She is on Plavix. .    Informant(s) for H&P: Patient    REVIEW OF SYSTEMS:  A comprehensive review of systems was negative except for: what is in the HPI      PMH:  Past Medical History:   Diagnosis Date    Cerebral artery occlusion with cerebral infarction ([facility] [address]     Diabetes mellitus ([facility] [address]        Surgical History:  Past Surgical History:   Procedure Laterality Date    CHOLECYSTECTOMY         Medications Prior to Admission:    Prior to Admission medications    Medication Sig Start Date End Date Taking?  Authorizing Provider   levothyroxine (SYNTHROID) 88 MCG tablet Take 1 tablet Monday through Saturday, 1.5 tablets on Sunday 12/12/22   FATOUMATA Tom - CNS   Dulaglutide (TRULICITY) 1.5 BI/0.2IN SOPN Inject 1.5 mg into the skin once a week 7/6/22   FATOUMATA Tom   atorvastatin (LIPITOR) 40 MG tablet Take 40 mg by mouth daily    Historical Provider, MD   clopidogrel (PLAVIX) 75 MG tablet  1/10/22   Historical Provider, MD   ferrous sulfate (IRON 325) 325 (65 Fe) MG tablet Take 325 mg by mouth daily (with breakfast) 3/7/22 6/5/22  Historical Provider, MD   irbesartan (AVAPRO) 150 MG tablet  2/20/22   Historical Provider, MD   pyridoxine (B-6) 50 MG tablet Take 50 mg by mouth daily 3/7/22 6/5/22  Historical Provider, MD   metFORMIN (GLUCOPHAGE) 1000 MG tablet Take 1 tablet by mouth 2 times daily (with meals) 4/6/22   FATOUMATA Tom - Harmony Pichardo FLEXTOUCH 100 UNIT/ML SOPN Inject 48 units subcutaneous  at night 4/6/22   FATOUMATA Brock - CNS       Allergies:    Shellfish-derived products, Bacitracin, Codeine, Penicillins, Azithromycin, Erythromycin, and Metoprolol    Social History:    reports that she has never smoked. She has never used smokeless tobacco. She reports that she does not drink alcohol. Family History:   family history is not on file. PHYSICAL EXAM:  Vitals:  BP (!) 113/49   Pulse 72   Temp 97.4 °F (36.3 °C)   Resp 17   Ht 5' 10\" (1.778 m)   Wt 210 lb (95.3 kg)   SpO2 96%   BMI 30.13 kg/m²     General Appearance: alert and oriented to person, place and time and in no acute distress  Skin: warm and dry  Head: normocephalic and atraumatic  Eyes: pupils equal, round, and reactive to light, extraocular eye movements intact, conjunctivae normal  Neck: neck supple and non tender without mass   Pulmonary/Chest: clear to auscultation bilaterally- no wheezes, rales or rhonchi, normal air movement, no respiratory distress  Cardiovascular: normal rate, normal S1 and S2 and no carotid bruits  Abdomen: Tender to palpation , non-distended, normal bowel sounds, no masses or organomegaly  Extremities: no cyanosis, no clubbing and no edema  Neurologic: no cranial nerve deficit and speech normal        LABS:  Recent Labs     01/22/23  1628      K 4.4      CO2 22   BUN 44*   CREATININE 0.7   GLUCOSE 277*   CALCIUM 9.4       Recent Labs     01/22/23  1628 01/22/23  1754 01/22/23 2056   WBC 3.3*  --   --    RBC 2.52*  --   --    HGB 8.0* 6.6* 7.7*   HCT 23.5* 19.7* 23.1*   MCV 93.3  --   --    MCH 31.7  --   --    MCHC 34.0  --   --    RDW 13.9  --   --    PLT 34*  --   --    MPV 11.9  --   --        No results for input(s): POCGLU in the last 72 hours.     CBC:   Lab Results   Component Value Date/Time    WBC 3.3 01/22/2023 04:28 PM    RBC 2.52 01/22/2023 04:28 PM    HGB 7.7 01/22/2023 08:56 PM    HCT 23.1 01/22/2023 08:56 PM MCV 93.3 01/22/2023 04:28 PM    MCH 31.7 01/22/2023 04:28 PM    MCHC 34.0 01/22/2023 04:28 PM    RDW 13.9 01/22/2023 04:28 PM    PLT 34 01/22/2023 04:28 PM    MPV 11.9 01/22/2023 04:28 PM     CMP:    Lab Results   Component Value Date/Time     01/22/2023 04:28 PM    K 4.4 01/22/2023 04:28 PM     01/22/2023 04:28 PM    CO2 22 01/22/2023 04:28 PM    BUN 44 01/22/2023 04:28 PM    CREATININE 0.7 01/22/2023 04:28 PM    GFRAA >60 01/18/2022 03:15 PM    LABGLOM >60 01/22/2023 04:28 PM    GLUCOSE 277 01/22/2023 04:28 PM    PROT 5.9 01/22/2023 04:28 PM    LABALBU 3.7 01/22/2023 04:28 PM    CALCIUM 9.4 01/22/2023 04:28 PM    BILITOT 0.8 01/22/2023 04:28 PM    ALKPHOS 48 01/22/2023 04:28 PM    AST 16 01/22/2023 04:28 PM    ALT 18 01/22/2023 04:28 PM     BMP:    Lab Results   Component Value Date/Time     01/22/2023 04:28 PM    K 4.4 01/22/2023 04:28 PM     01/22/2023 04:28 PM    CO2 22 01/22/2023 04:28 PM    BUN 44 01/22/2023 04:28 PM    LABALBU 3.7 01/22/2023 04:28 PM    CREATININE 0.7 01/22/2023 04:28 PM    CALCIUM 9.4 01/22/2023 04:28 PM    GFRAA >60 01/18/2022 03:15 PM    LABGLOM >60 01/22/2023 04:28 PM    GLUCOSE 277 01/22/2023 04:28 PM       Radiology:   CT ABDOMEN PELVIS W IV CONTRAST Additional Contrast? None   Final Result   Cirrhosis with portal hypertension manifesting as splenomegaly, ascending   colon wall thickening, small volume of ascites and collateral vessels   including esophageal varices. Diverticulosis without evidence of acute diverticulitis. EKG:     ASSESSMENT:      Principal Problem:    Upper GI bleed  Active Problems:    Esophageal bleed, non-variceal  Resolved Problems:    * No resolved hospital problems. *      PLAN:    1. Esophageal varices-continue octreotide drip, PPI drip, IV fluid resuscitation. Monitor H&H every hour. Will be admitted to the ICU. Monitor CBC and BMP daily. GI consulted. Monitor vitals every hour. 2.  ITP-management per ICU. Consider steroids. Monitor CBC daily. 3.  Hypothyroidism-continue Synthroid  4. Hypertension-continue home medications. Vitals every 8 hours. 5.  Diabetes type 2-continue home regimen. Monitor blood sugar checks ACH S.  SSI as needed. 6.  Parotid cancer  7. Leukopenia  8. Acute blood loss anemia-s/p 2 units in the emergency department. 9.  Hyperglycemia-continue Home medication. Monitor CBC and BMP daily. NPO. Code Status: Full code  DVT prophylaxis: Contraindicated      NOTE: This report was transcribed using voice recognition software. Every effort was made to ensure accuracy; however, inadvertent computerized transcription errors may be present.   Electronically signed by Yasmin Phelps MD on 1/22/2023 at 10:46 PM

## 2023-01-23 NOTE — PROGRESS NOTES
Kimberly Feng was ordered dulaglutide (TRULICITY) which is a nonformulary medication. This medication will need to be supplied by the patient as the pharmacy does not carry this non-formulary medication. If the medication has not been administered by 1400 on the following day from the time the order was placed, a pharmacist will follow-up with the nurse of the patient to assess the capability of the patient to bring in the medication. If it is determined that the patient cannot supply the medication and it is not available to be dispensed from the pharmacy, the provider will be notified.   Kareen Hodgkin, [facility]  1/23/2023  1:03 AM

## 2023-01-23 NOTE — CONSULTS
Gastroenterology Consult Note   FATOUMATA Cuba NP-C with Dolores Meckel, M.D. Consult Note        Date of Service: 1/23/2023  Reason for Consult: active GI bleed hemoglobin 6.6  Requesting Physician: Dr Julian Ramires:  hematemesis     History Obtained From:  patient, electronic medical record    HISTORY OF PRESENT ILLNESS:       Pa Ramirez is a 57 y.o. female with significant past medical history of cirrhosis, ITP, splenomegaly and parotid cancer admitted via ED for hematemesis and melenotic stools. Pt reports she had sudden onset hematemesis described as dark brown with dark red clots followed by bright red blood Sunday morning x2. Had approximately 4 bowel movements described as black and tarry. Felt fatigued, weak, dizzy and lightheaded at that time. Denies similar episodes. No complaints of chest pain, shortness of breath, epigastric pain or abdominal pain. Reports mild abdominal distention onset 2 weeks ago. No abnormal edema. Denies confusion. Prior to felt to be in good health. Recently diagnosed with cirrhosis in [place]. Follows with hematology for pancytopenia. Reports she has appointment with GI in [place] this Wednesday, 1/25/2023 to establish. History of a stroke in 2012 on Plavix. Takes ibuprofen intermittently once every couple months, last dose on Friday for lower back pain. No prior EGD. Colonoscopy approximately 5 years ago reportedly normal.  No significant family medical history. Has received 2 units of RBC 1 pack of platelets thus far. Admission labs hgb 8.0, platelet 34. CT abdomen pelvis with IV contrast-cirrhosis with portal hypertension manifesting as splenomegaly, ascending colon wall thickening, small volume of ascites and collateral vessels including esophageal varices. Diverticulosis without evidence of acute diverticulitis. Consultation for active GI bleed hemoglobin 6.6. Currently, pt reports no nausea last bout of emesis yesterday.   No BM today. Fatigued. Has received 2 units of RBC and 1 pack of platelets thus far. Will transfuse platelets prior to procedure. Per blood bank only 1 pack available. .  Labs today platelet 24, hgb 7.8.     Past Medical History:        Diagnosis Date    Cerebral artery occlusion with cerebral infarction ([facility] [address]     Diabetes mellitus ([facility] [address]      Past Surgical History:        Procedure Laterality Date    CHOLECYSTECTOMY       Current Medications:    Current Facility-Administered Medications: atorvastatin (LIPITOR) tablet 40 mg, 40 mg, Oral, Daily  levothyroxine (SYNTHROID) tablet 88 mcg, 88 mcg, Oral, Daily  methylPREDNISolone sodium (SOLU-MEDROL) injection 60 mg, 60 mg, IntraVENous, Daily  0.9 % sodium chloride infusion, , IntraVENous, PRN  pantoprazole (PROTONIX) 40 mg in sodium chloride (PF) 0.9 % 10 mL injection, 40 mg, IntraVENous, Q12H  0.9 % sodium chloride infusion, , IntraVENous, PRN  sodium chloride flush 0.9 % injection 5-40 mL, 5-40 mL, IntraVENous, 2 times per day  sodium chloride flush 0.9 % injection 5-40 mL, 5-40 mL, IntraVENous, PRN  0.9 % sodium chloride infusion, , IntraVENous, PRN  ondansetron (ZOFRAN-ODT) disintegrating tablet 4 mg, 4 mg, Oral, Q8H PRN **OR** ondansetron (ZOFRAN) injection 4 mg, 4 mg, IntraVENous, Q6H PRN  polyethylene glycol (GLYCOLAX) packet 17 g, 17 g, Oral, Daily PRN  acetaminophen (TYLENOL) tablet 650 mg, 650 mg, Oral, Q6H PRN **OR** acetaminophen (TYLENOL) suppository 650 mg, 650 mg, Rectal, Q6H PRN  potassium chloride 20 mEq/50 mL IVPB (Central Line), 20 mEq, IntraVENous, PRN **OR** potassium chloride 10 mEq/100 mL IVPB (Peripheral Line), 10 mEq, IntraVENous, PRN  albuterol (PROVENTIL) nebulizer solution 2.5 mg, 2.5 mg, Nebulization, Q2H PRN  0.9 % sodium chloride infusion, , IntraVENous, Continuous  cefTRIAXone (ROCEPHIN) 1,000 mg in sterile water 10 mL IV syringe, 1,000 mg, IntraVENous, Q24H  octreotide (SANDOSTATIN) 500 mcg in sodium chloride 0.9 % 100 mL infusion, 50 mcg/hr, IntraVENous, Continuous  insulin lispro (HUMALOG) injection vial 0-4 Units, 0-4 Units, SubCUTAneous, Q6H  glucose chewable tablet 16 g, 4 tablet, Oral, PRN  dextrose bolus 10% 125 mL, 125 mL, IntraVENous, PRN **OR** dextrose bolus 10% 250 mL, 250 mL, IntraVENous, PRN  glucagon (rDNA) injection 1 mg, 1 mg, SubCUTAneous, PRN  dextrose 10 % infusion, , IntraVENous, Continuous PRN    Allergies:  Shellfish-derived products, Bacitracin, Codeine, Penicillins, Azithromycin, Erythromycin, and Metoprolol    Social History:    Tobacco:  Pt reports no history of current use  Alcohol:  Pt reports no history or current use  Illicit Drugs: Pt reports no history or current use    Family History:   Nonpertinent per patient    REVIEW OF SYSTEMS:    Aside from what was mentioned in the PMH and HPI, essentially unremarkable, all others negative. PHYSICAL EXAM:      Vitals:    /67   Pulse 73   Temp 98.1 °F (36.7 °C) (Oral)   Resp 24   Ht 5' 10\" (1.778 m)   Wt 216 lb 7.9 oz (98.2 kg)   SpO2 97%   BMI 31.06 kg/m²       CONSTITUTIONAL:  awake, alert, fatigued and ill-appearing laying in ICU bed cooperative, no apparent distress, and appears stated age  EYES:  pupils equal, round and reactive to light, sclera anicteric and conjunctiva normal  ENT:  normocephalic, oral pharynx with moist mucous membranes  NECK:  supple   HEMATOLOGIC/LYMPHATICS:  no cervical lymphadenopathy and no supraclavicular lymphadenopathy  LUNGS:  No increased work of breathing, good air exchange, clear to auscultation bilaterally.   CARDIOVASCULAR:  Normal apical impulse, regular rate and rhythm, no murmur noted; 2+ pulses; no edema, vascular discoloration  ABDOMEN:  normal bowel sounds, soft, non-distended, non-tender, no masses palpated, no hepatosplenomegally  MUSCULOSKELETAL:  full range of motion noted  motor strength is 5 out of 5 all extremities bilaterally  NEUROLOGIC:  Mental Status Exam:  Level of Alertness:   awake  Orientation: person, place, time  Motor Exam:  Motor exam is symmetrical 5 out of 5 all extremities bilaterally  SKIN: Pale skin color, texture, turgor    DATA:    CBC with Differential:    Lab Results   Component Value Date/Time    WBC 2.3 01/23/2023 04:13 AM    RBC 2.59 01/23/2023 04:13 AM    HGB 7.8 01/23/2023 04:13 AM    HCT 23.5 01/23/2023 04:13 AM    PLT 24 01/23/2023 04:13 AM    MCV 90.7 01/23/2023 04:13 AM    MCH 30.1 01/23/2023 04:13 AM    MCHC 33.2 01/23/2023 04:13 AM    RDW 14.6 01/23/2023 04:13 AM    LYMPHOPCT 12.7 01/23/2023 04:13 AM    MONOPCT 4.4 01/23/2023 04:13 AM    BASOPCT 0.4 01/23/2023 04:13 AM    MONOSABS 0.10 01/23/2023 04:13 AM    LYMPHSABS 0.29 01/23/2023 04:13 AM    EOSABS 0.01 01/23/2023 04:13 AM    BASOSABS 0.01 01/23/2023 04:13 AM     CMP:    Lab Results   Component Value Date/Time     01/23/2023 04:13 AM    K 4.4 01/23/2023 04:13 AM     01/23/2023 04:13 AM    CO2 23 01/23/2023 04:13 AM    BUN 36 01/23/2023 04:13 AM    CREATININE 0.9 01/23/2023 04:13 AM    GFRAA >60 01/18/2022 03:15 PM    LABGLOM >60 01/23/2023 04:13 AM    GLUCOSE 185 01/23/2023 04:13 AM    PROT 5.4 01/23/2023 04:13 AM    LABALBU 3.5 01/23/2023 04:13 AM    CALCIUM 9.0 01/23/2023 04:13 AM    BILITOT 0.8 01/23/2023 04:13 AM    ALKPHOS 43 01/23/2023 04:13 AM    AST 19 01/23/2023 04:13 AM    ALT 20 01/23/2023 04:13 AM     Hepatic Function Panel:    Lab Results   Component Value Date/Time    ALKPHOS 43 01/23/2023 04:13 AM    ALT 20 01/23/2023 04:13 AM    AST 19 01/23/2023 04:13 AM    PROT 5.4 01/23/2023 04:13 AM    BILITOT 0.8 01/23/2023 04:13 AM    BILIDIR 0.2 01/22/2023 04:28 PM    IBILI 0.6 01/22/2023 04:28 PM    LABALBU 3.5 01/23/2023 04:13 AM     PT/INR:    Lab Results   Component Value Date/Time    PROTIME 16.1 01/23/2023 04:13 AM    INR 1.4 01/23/2023 04:13 AM     PTT:    Lab Results   Component Value Date/Time    APTT 28.9 01/22/2023 04:28 PM   [APTT}  Last 3 Troponin:  No results found for: TROPONINI  TSH:    Lab  Results   Component Value Date/Time    TSH 5.44 12/01/2022 12:00 AM         Lab Results   Component Value Date    TRIG 100 12/01/2022       Lab Results   Component Value Date    HDL 42 12/01/2022       Lab Results   Component Value Date    LDLCALC 40 12/01/2022     No results found for: LABVLDL     CT ABDOMEN PELVIS W IV CONTRAST Additional Contrast? None    Result Date: 1/22/2023  EXAMINATION: CT OF THE ABDOMEN AND PELVIS WITH CONTRAST 1/22/2023 3:34 pm TECHNIQUE: CT of the abdomen and pelvis was performed with the administration of intravenous contrast. Multiplanar reformatted images are provided for review. Automated exposure control, iterative reconstruction, and/or weight based adjustment of the mA/kV was utilized to reduce the radiation dose to as low as reasonably achievable. COMPARISON: None. HISTORY: ORDERING SYSTEM PROVIDED HISTORY: abd pain, rectal bleeding TECHNOLOGIST PROVIDED HISTORY: Reason for exam:->abd pain, rectal bleeding Additional Contrast?->None Decision Support Exception - unselect if not a suspected or confirmed emergency medical condition->Emergency Medical Condition (MA) FINDINGS: Lower Chest:  Visualized portion of the lower chest demonstrates no acute abnormality. Distal esophageal varices are noted. Organs: The liver demonstrates a cirrhotic morphology with hypertrophy of the caudate lobe and left lateral segment as well as a nodular surface. A recanalized paraumbilical vein is seen. The portal vein is patent. Status post cholecystectomy. No biliary ductal dilatation seen. Spleen is significantly enlarged measuring 21.0 cm. Prominent perisplenic collateral vessels are noted. The pancreas and adrenal glands are unremarkable. The kidneys enhance symmetrically without evidence of hydronephrosis. GI/Bowel: Stomach and duodenal sweep demonstrate no acute abnormality. There is no evidence of bowel obstruction. No evidence of abnormal bowel wall thickening or distension.   Mild edematous wall thickening of the ascending colon is noted, likely related to portal hypertension. The appendix is visualized and is unremarkable. No evidence of acute appendicitis. There is diverticulosis without evidence of diverticulitis. Pelvis:  Bladder is unremarkable in appearance. The uterus and adnexa are without acute abnormality. Peritoneum/Retroperitoneum: A small volume of ascites is seen, predominantly in the perihepatic and perisplenic locations. No free air is noted. No evidence of lymphadenopathy. Aorta is normal in caliber. Bones/Soft Tissues:  No acute abnormality of the visualized osseous structures. Cirrhosis with portal hypertension manifesting as splenomegaly, ascending colon wall thickening, small volume of ascites and collateral vessels including esophageal varices. Diverticulosis without evidence of acute diverticulitis. XR CHEST PORTABLE    Result Date: 1/23/2023  EXAMINATION: ONE XRAY VIEW OF THE CHEST 1/23/2023 5:52 am COMPARISON: None. HISTORY: ORDERING SYSTEM PROVIDED HISTORY: SOB TECHNOLOGIST PROVIDED HISTORY: Reason for exam:->SOB FINDINGS: The lungs are without acute focal process. There is no effusion or pneumothorax. The cardiomediastinal silhouette is without acute process. The osseous structures are without acute process. No acute process. IMPRESSION:  Hematemesis  Melena  Anemia, acute GI bleed  Cirrhosis  Pancytopenia  History of a stroke on Plavix  Fatigue  Weakness  History of ITP, follows with hematology at [facility]    RECOMMENDATIONS:    Critical care management per ICU team  EGD with possible banding today. Procedure details for EGD discussed in detail. Complications including but not limited to, perforation, bleeding and infection were discussed in great detail. Risks, benefits, and alternatives explained. Pt has understood the information and has agreed to proceed.    3 pack of platelets ordered however only 1 available per blood bank; orders placed to transfuse prior to procedure  Continue octreotide drip  Protonix 40 mg IV every 12  Keep n.p.o. Hold anticoagulants  Medicate for nausea prophylactically, discussed with nursing  Monitor CBC, CMP and INR daily, transfuse per ICU  Liver serology ordered  Supportive care  Continue to monitor     Note: This report was completed utilizing computer voice recognition software. Every effort has been made to ensure accuracy, however; inadvertent computerized transcription errors may be present. Thank you very much for your consultation. We will follow closely with you.     Discussed with Dr. Emma Eduardo developed by Dr. Sonali Crane, APRN, NP-C 1/23/2023 9:54 AM for Dr. Va Eric

## 2023-01-23 NOTE — ANESTHESIA PRE PROCEDURE
Department of Anesthesiology  Preprocedure Note       Name:  Cheryl Love   Age:  57 y.o.  :  1960                                          MRN:  [id number]         Date:  2023      Surgeon: Roxanne Alcocer):  Andrea Monreal MD    Procedure: Procedure(s):  EGD POSSIBLE BANDING    Medications prior to admission:   Prior to Admission medications    Medication Sig Start Date End Date Taking?  Authorizing Provider   levothyroxine (SYNTHROID) 88 MCG tablet Take 1 tablet Monday through Saturday, 1.5 tablets on 22   FATOUMATA Reynolds   Dulaglutide (TRULICITY) 1.6 YO/2.3LF SOPN Inject 1.5 mg into the skin once a week 22   FATOUMATA Reynolds   atorvastatin (LIPITOR) 40 MG tablet Take 40 mg by mouth daily    Historical Provider, MD   clopidogrel (PLAVIX) 75 MG tablet  1/10/22   Historical Provider, MD   ferrous sulfate (IRON 325) 325 (65 Fe) MG tablet Take 325 mg by mouth daily (with breakfast) 3/7/22 6/5/22  Historical Provider, MD   irbesartan (AVAPRO) 150 MG tablet  22   Historical Provider, MD   metFORMIN (GLUCOPHAGE) 1000 MG tablet Take 1 tablet by mouth 2 times daily (with meals) 22   FATOUMATA Reynolds   TRESIBA FLEXTOUCH 100 UNIT/ML SOPN Inject 48 units subcutaneous  at night 22   FATOUMATA Reynolds       Current medications:    Current Facility-Administered Medications   Medication Dose Route Frequency Provider Last Rate Last Admin    atorvastatin (LIPITOR) tablet 40 mg  40 mg Oral Daily Deya Lester MD        levothyroxine (SYNTHROID) tablet 88 mcg  88 mcg Oral Daily Deya Lester MD   88 mcg at 23 0618    methylPREDNISolone sodium (SOLU-MEDROL) injection 60 mg  60 mg IntraVENous Daily Julio Perdue DO   60 mg at 23 1021    0.9 % sodium chloride infusion   IntraVENous PRN FATOUMATA Prado CNP        pantoprazole (PROTONIX) 40 mg in sodium chloride (PF) 0.9 % 10 mL injection  40 mg IntraVENous Q12H Ellis Diaz FATOUMATA Parks - CNP   40 mg at 01/23/23 1022    0.9 % sodium chloride infusion   IntraVENous PRN Sanju Lopez DO        sodium chloride flush 0.9 % injection 5-40 mL  5-40 mL IntraVENous 2 times per day Del Montgomery MD   10 mL at 01/23/23 0935    sodium chloride flush 0.9 % injection 5-40 mL  5-40 mL IntraVENous PRN Del Montgomery MD        0.9 % sodium chloride infusion   IntraVENous PRN Del Montgomery MD        ondansetron (ZOFRAN-ODT) disintegrating tablet 4 mg  4 mg Oral Q8H PRN Del Montgomery MD        Or    ondansetron [facility]) injection 4 mg  4 mg IntraVENous Q6H PRN Del Montgomery MD   4 mg at 01/23/23 1022    polyethylene glycol (GLYCOLAX) packet 17 g  17 g Oral Daily PRN Del Montgomery MD        acetaminophen (TYLENOL) tablet 650 mg  650 mg Oral Q6H PRN Del Montgomery MD        Or   Isela Taylor acetaminophen (TYLENOL) suppository 650 mg  650 mg Rectal Q6H PRN Del Montgomery MD        potassium chloride 20 mEq/50 mL IVPB (Central Line)  20 mEq IntraVENous PRN Del Montgomery MD        Or   Isela Taylor potassium chloride 10 mEq/100 mL IVPB (Peripheral Line)  10 mEq IntraVENous PRN Del Montgomery MD        albuterol (PROVENTIL) nebulizer solution 2.5 mg  2.5 mg Nebulization Q2H PRN Del Montgomery MD        0.9 % sodium chloride infusion   IntraVENous Continuous Del Montgomery  mL/hr at 01/23/23 0934 New Bag at 01/23/23 0934    cefTRIAXone (ROCEPHIN) 1,000 mg in sterile water 10 mL IV syringe  1,000 mg IntraVENous Q24H FATOUMATA Be CNP        octreotide (SANDOSTATIN) 500 mcg in sodium chloride 0.9 % 100 mL infusion  50 mcg/hr IntraVENous Continuous FATOUMATA Be - CNP 10 mL/hr at 01/23/23 0731 50 mcg/hr at 01/23/23 0731    insulin lispro (HUMALOG) injection vial 0-4 Units  0-4 Units SubCUTAneous Q6H FATOUMATA Be CNP   2 Units at 01/23/23 1220    glucose chewable tablet 16 g  4 tablet Oral PRN FATOUMATA Be CNP        dextrose bolus 10% 125 mL  125 mL IntraVENous PRN Mckenzie Hope, APRN - CNP        Or    dextrose bolus 10% 250 mL  250 mL IntraVENous PRN Mckenzie Hope, APRN - CNP        glucagon (rDNA) injection 1 mg  1 mg SubCUTAneous PRN Mckenzie Hope, APRN - CNP        dextrose 10 % infusion   IntraVENous Continuous PRN Mckenzie Hope, APRN - CNP           Allergies: Allergies   Allergen Reactions    Shellfish-Derived Products Hives    Bacitracin Itching    Codeine      Other reaction(s):  Other: See Comments, Unknown  Tingling arms, heart palpitations      Penicillins      Other reaction(s): Unknown  In childhood      Azithromycin Rash    Erythromycin Rash    Metoprolol Rash       Problem List:    Patient Active Problem List   Diagnosis Code    Upper GI bleed K92.2    Esophageal bleed, non-variceal K22.89    Thrombocythemia D75.839    Chronic ITP (idiopathic thrombocytopenic purpura) (HCC) D69.3    Acute blood loss anemia D62    Other cirrhosis of liver (HCC) K74.69    History of ITP Z86.2    Pancytopenia (HCC) S02.624       Past Medical History:        Diagnosis Date    [facility]erebral artery occlusion with [facility] infarction [address] [address]     [facility]        Past Surgical History:        Procedure Laterality Date    CHOLECYSTECTOMY         Social History:    Social History     Tobacco Use    Smoking status: Never    Smokeless tobacco: Never   Substance Use Topics    Alcohol use: Never                                Counseling given: Not Answered      Vital Signs (Current):   Vitals:    01/23/23 0600 01/23/23 0700 01/23/23 1042 01/23/23 1132   BP: (!) 132/58 129/67 (!) 124/50 (!) 130/51   Pulse: 74 73 67 69   Resp: 13 24 19 18   Temp:   97.7 °F (36.5 °C) 97.7 °F (36.5 °C)   TempSrc:   Oral    SpO2: 94% 97% 96% 97%   Weight: 216 lb 7.9 oz (98.2 kg)      Height:                                                  BP Readings from Last 3 Encounters:   01/23/23 (!) 130/51   11/09/22 113/66   07/06/22 112/68       NPO Status: BMI:   Wt Readings from Last 3 Encounters:   01/23/23 216 lb 7.9 oz (98.2 kg)   11/09/22 221 lb (100.2 kg)   07/06/22 219 lb (99.3 kg)     Body mass index is 31.06 kg/m². CBC:   Lab Results   Component Value Date/Time    WBC 1.8 01/23/2023 11:30 AM    RBC 2.55 01/23/2023 11:30 AM    HGB 7.6 01/23/2023 11:30 AM    HCT 23.3 01/23/2023 11:30 AM    MCV 91.4 01/23/2023 11:30 AM    RDW 14.7 01/23/2023 11:30 AM    PLT 26 01/23/2023 11:30 AM       CMP:   Lab Results   Component Value Date/Time     01/23/2023 04:13 AM    K 4.4 01/23/2023 04:13 AM     01/23/2023 04:13 AM    CO2 23 01/23/2023 04:13 AM    BUN 36 01/23/2023 04:13 AM    CREATININE 0.9 01/23/2023 04:13 AM    GFRAA >60 01/18/2022 03:15 PM    LABGLOM >60 01/23/2023 04:13 AM    GLUCOSE 185 01/23/2023 04:13 AM    PROT 5.4 01/23/2023 04:13 AM    CALCIUM 9.0 01/23/2023 04:13 AM    BILITOT 0.8 01/23/2023 04:13 AM    ALKPHOS 43 01/23/2023 04:13 AM    AST 19 01/23/2023 04:13 AM    ALT 20 01/23/2023 04:13 AM       POC Tests: No results for input(s): POCGLU, POCNA, POCK, POCCL, POCBUN, POCHEMO, POCHCT in the last 72 hours.     Coags:   Lab Results   Component Value Date/Time    PROTIME 16.1 01/23/2023 04:13 AM    INR 1.4 01/23/2023 04:13 AM    APTT 28.9 01/22/2023 04:28 PM       HCG (If Applicable): No results found for: PREGTESTUR, PREGSERUM, HCG, HCGQUANT     ABGs: No results found for: PHART, PO2ART, RFH9SAP, FPV5GWI, BEART, T9ZKBZBF     Type & Screen (If Applicable):  No results found for: LABABO, LABRH    Drug/Infectious Status (If Applicable):  No results found for: HIV, HEPCAB    COVID-19 Screening (If Applicable): No results found for: COVID19        Anesthesia Evaluation  Patient summary reviewed no history of anesthetic complications:   Airway: Mallampati: II  TM distance: >3 FB   Neck ROM: full     Dental:          Pulmonary:Negative Pulmonary ROS breath sounds clear to auscultation                             Cardiovascular:    (+) hyperlipidemia        Rhythm: regular  Rate: normal           Beta Blocker:  Not on Beta Blocker         Neuro/Psych:   (+) CVA:,             GI/Hepatic/Renal:   (+) liver disease (Other cirrhosis of liver ([facility] [address])):,          ROS comment: Upper GI bleed. Endo/Other:    (+) DiabetesType II DM, , hypothyroidism, blood dyscrasia (Chronic ITP (idiopathic thrombocytopenic purpura) (HCC)): anemia and thrombocytopenia:., .                  ROS comment: obese Abdominal:             Vascular: negative vascular ROS. Other Findings:           Anesthesia Plan      MAC     ASA 4       Induction: intravenous. Anesthetic plan and risks discussed with patient. Plan discussed with CRNA. Simone Kay MD   1/23/2023    Chart reviewed, patient seen. Agree with above assessment.     FATOUMATA Dickey - CRNA    1/23/23

## 2023-01-23 NOTE — PLAN OF CARE
Problem: Discharge Planning  Goal: Discharge to home or other facility with appropriate resources  Outcome: Progressing  Flowsheets (Taken 1/22/2023 2300)  Discharge to home or other facility with appropriate resources: Identify barriers to discharge with patient and caregiver     Problem: Pain  Goal: Verbalizes/displays adequate comfort level or baseline comfort level  Outcome: Progressing  Flowsheets (Taken 1/23/2023 0400)  Verbalizes/displays adequate comfort level or baseline comfort level: Encourage patient to monitor pain and request assistance     Problem: Safety - Adult  Goal: Free from fall injury  Outcome: Progressing  Flowsheets (Taken 1/23/2023 0500)  Free From Fall Injury: Instruct family/caregiver on patient safety     Problem: ABCDS Injury Assessment  Goal: Absence of physical injury  Outcome: Progressing  Flowsheets (Taken 1/23/2023 0500)  Absence of Physical Injury: Implement safety measures based on patient assessment

## 2023-01-23 NOTE — CONSULTS
GENERAL SURGERY  CONSULT NOTE  1/23/2023    Physician Consulted: Dr. Dina Merida  Reason for Consult: GIB  Referring Physician: Dr. Diaz Garrido    HPI  Rayshawn Gonzalez is a 57 y.o. female who presents for evaluation of hematemesis. History significant for DM, CVA, on Plavix, cirrhosis and cholecystectomy. General surgery consulted for GI bleed. Patient states she was experiencing nausea and had dark brown emesis and dark stools. Over several hours is progressed to hematemesis. She states this has never happened in the past.  Patient recently outside facility for thrombocytopenia, hepatomegaly and splenomegaly. Patient has a history of bone marrow biopsy. She was scheduled to see outpatient GI at outside hospital this week, with GI intentions to do a liver biopsy. Patient denies ever having a colonoscopy or EGD in the past.  CT abdomen pelvis with cirrhosis, small volume ascites and visible esophageal varices. Patient presenting hemoglobin 7.7, she was subsequently transfused 2 units of PRBCs earlier this morning 1 unit of platelets. Past Medical History:   Diagnosis Date    Cerebral artery occlusion with cerebral infarction ([address]     Diabetes mellitus ([facility] [address]        Past Surgical History:   Procedure Laterality Date    CHOLECYSTECTOMY         Medications Prior to Admission    Prior to Admission medications    Medication Sig Start Date End Date Taking?  Authorizing Provider   levothyroxine (SYNTHROID) 88 MCG tablet Take 1 tablet Monday through Saturday, 1.5 tablets on Sunday 12/12/22   Mahsa Ohs, APRN - CNS   Dulaglutide (TRULICITY) 1.5 AO/2.4TG SOPN Inject 1.5 mg into the skin once a week 7/6/22   Mahsa Ohs, APRN - CNS   atorvastatin (LIPITOR) 40 MG tablet Take 40 mg by mouth daily    Historical Provider, MD   clopidogrel (PLAVIX) 75 MG tablet  1/10/22   Historical Provider, MD   ferrous sulfate (IRON 325) 325 (65 Fe) MG tablet Take 325 mg by mouth daily (with breakfast) 3/7/22 6/5/22  Historical Provider, MD   irbesartan (AVAPRO) 150 MG tablet  2/20/22   Historical Provider, MD   metFORMIN (GLUCOPHAGE) 1000 MG tablet Take 1 tablet by mouth 2 times daily (with meals) 4/6/22   FATOUMATA Feldman - CNS   TRESIBA FLEXTOUCH 100 UNIT/ML SOPN Inject 48 units subcutaneous  at night 4/6/22   FATOUMATA Feldman - CNS       Allergies   Allergen Reactions    Shellfish-Derived Products Hives    Bacitracin Itching    Codeine      Other reaction(s): Other: See Comments, Unknown  Tingling arms, heart palpitations      Penicillins      Other reaction(s): Unknown  In childhood      Azithromycin Rash    Erythromycin Rash    Metoprolol Rash       History reviewed. No pertinent family history. Social History     Tobacco Use    Smoking status: Never    Smokeless tobacco: Never   Vaping Use    Vaping Use: Never used   Substance Use Topics    Alcohol use: Never         Review of Systems: pertinent ROS listed in HPI, all others negative       PHYSICAL EXAM:    Vitals:    01/23/23 0600   BP: (!) 132/58   Pulse: 74   Resp: 13   Temp:    SpO2: 94%       GENERAL: Comfortable, answers questions appropriately  HEAD:  Normocephalic. Atraumatic. EYES:   No scleral icterus. PERRL. LUNGS: Unlabored breathing on room air  CARDIOVASCULAR: RR and normotensive  ABDOMEN:  Soft, non-distended, non-tender. No guarding, rigidity, rebound. EXTREMITIES:   MAEx4. Atraumatic. No LE edema. SKIN:  Warm, normal turgor  NEUROLOGIC:  GCS 15    ASSESSMENT/PLAN:  57 y.o. female with hematemesis in the setting of cirrhosis and esophageal varices    Plan will be  - Appreciate GI input  - Continue supportive care  - Monitor hemoglobin transfuse blood products as needed     discussed with Dr. William Cullen. Codie Somers DO  Surgery Resident PGY-1  1/23/2023  7:23 AM     ATTENDING PHYSICIAN PROGRESS NOTE      I have examined the patient, reviewed the record, and discussed the case with the Resident.  I have reviewed all relevant labs and imaging data. Please refer to the resident's note. I agree with the assessment and plan with the following corrections/ additions. The following summarizes my clinical findings and independent assessment.      April Brito MD

## 2023-01-23 NOTE — PROGRESS NOTES
Critical Care Team - Daily Progress Note      Date and time: 1/23/2023 7:17 AM  Patient's name:  Claude Huffman  Medical Record Number: [id number]  Patient's account/billing number: [de-identified]  Patient's YOB: 1960  Age: 57 y.o. Date of Admission: 1/22/2023  3:43 PM  Length of stay during current admission: 1      Primary Care Physician: Micki Duenas MD  ICU Attending Physician: Dr. Irving Santiago    Code Status: Full Code    Reason for ICU admission: GI bleed      SUBJECTIVE:     OVERNIGHT EVENTS:           01/23: No acute events overnight. No new vomiting. Patient has one black Bm since admission. Complaining of generalized abdominal pain, soft abdomen. Gi is planning to scope at 4pm, will receive another until of PLTs prior to scope. Intake/Output:   No intake/output data recorded. I/O last 3 completed shifts: In: 4036.6 [I.V.:1584.5; Blood:1449.3; IV Piggyback:1002.9]  Out: -     ROS:  Unless stated above, ROS is otherwise negative. Initial HPI + past overnight events: The patient is a 57 y.o. female with significant past medical history of stroke, carotid carcinoma, type II DM, HTN, cirrhosis, portal hypertension, esophageal varices, ITP, and hypothyroidism. Patient presented to ED today for reported episode of hematemesis with blood clots bright red blood as well as melena beginning evening of 1/22/23 and worsening throughout the day today. Patient vital signs have been hemodynamically stable throughout ED course. Initial Hgb was 8.0 patient did have 1 episode of melena and ED repeat Hgb was 6.6. She received a total of 2 units of PRBC in ED. UA was dirty and she was given 1 g Rocephin IV for presumed UTI. CT of her abdomen and pelvis was reviewed and again reveals cirrhosis and portal hypertension as well as splenomegaly and esophageal varices. GI was consulted in ED will see patient in the morning. She was given Sandostatin and Protonix bolus and initiated on infusions. Other labs significant for pancytopenia (please see below) which is being worked up at [facility] and thought to be due to ITP. Patient at this time is electing not to have central line placed. Did discuss with her that if she became hemodynamically unstable or would  have another acute drop in hemoglobin a central line would be indicated and beneficial.  She is agreeable to this. Patient will be managed overnight in ICU. Will transfuse as needed. Awaiting GI recommendations with likely scope in the morning. Information obtained from care everywhere [facility]: HX of ITP 25 years ago 7 weeks after the birth of her son. she suffered a stroke in January 2012. It is felt to be an embolic stroke from a right carotid artery disease and she has undergone right carotid endarterectomy in March of 2012. She has not had other DVTs or PEs. There is no family history of thrombosis\". Recently experiencing thrombocytopenia with extensive work-up done at [facility] including 2 separate bone marrow biopsies, T-cell clonality testing, extensive work-up for pancytopenia. Exact etiology never found including no significant findings on NGS panel. Increased episodes of epistaxis which required 30 to 60 minutes of holding pressure. She was having worsening cytopenias recent bone marrow biopsy November significantly unremarkable. Was found to have cirrhotic liver morphology with subsequent significant splenomegaly at 24 cm. They proceeded with treatment for presumed ITP. Was started on daily Promacta 25 mg with platelet goal of 50 K however, she never started this and it was switched to Doptelet 20 mg po daily on 1/20/23 still awaiting approval from insurance.   Also has underlying component of iron deficiency anemia     OBJECTIVE:     VITAL SIGNS:  BP (!) 132/58   Pulse 74   Temp 98.1 °F (36.7 °C) (Oral)   Resp 13   Ht 5' 10\" (1.778 m)   Wt 216 lb 7.9 oz (98.2 kg)   SpO2 94%   BMI 31.06 kg/m²   Tmax over 24 hours:  Temp (24hrs), Av.3 °F (36.8 °C), Min:97.4 °F (36.3 °C), Max:98.7 °F (37.1 °C)      Patient Vitals for the past 6 hrs:   BP Temp Temp src Pulse Resp SpO2 Weight   23 0600 (!) 132/58 -- -- 74 13 94 % 216 lb 7.9 oz (98.2 kg)   23 0500 (!) 114/58 -- -- 65 16 96 % --   23 0400 (!) 119/58 98.1 °F (36.7 °C) Oral 65 18 96 % --   23 0358 (!) 115/57 98.1 °F (36.7 °C) Oral 65 18 96 % --   23 0350 (!) 119/58 98.1 °F (36.7 °C) -- 65 18 96 % --   23 0333 (!) 123/54 98.7 °F (37.1 °C) Oral 68 16 96 % --   23 0330 (!) 123/54 98.4 °F (36.9 °C) Oral 67 22 94 % --   23 0318 (!) 122/57 98.4 °F (36.9 °C) Oral 65 13 96 % --   23 0313 (!) 122/57 98.7 °F (37.1 °C) Oral 64 16 96 % --   23 0300 -- -- -- 62 17 -- --   23 0200 136/62 -- -- 76 19 -- --         Intake/Output Summary (Last 24 hours) at 2023 0717  Last data filed at 2023 6581  Gross per 24 hour   Intake 4036.63 ml   Output --   Net 4036.63 ml     Wt Readings from Last 2 Encounters:   23 216 lb 7.9 oz (98.2 kg)   22 221 lb (100.2 kg)     Body mass index is 31.06 kg/m².       PHYSICAL EXAM:  CONSTITUTIONAL:  awake, alert, cooperative, no apparent distress, and appears stated age  EYES: Periorbital puffiness, pupils equal, round and reactive to light, extra ocular muscles intact, sclera clear, conjunctiva pale  ENT:  Normocephalic, without obvious abnormality, atraumatic, sinuses nontender on palpation, external ears without lesions, oral pharynx with moist mucus membranes  NECK: Supple, symmetrical, no adenopathy, surgical scar right side  HEMATOLOGIC/LYMPHATICS:  no cervical lymphadenopathy  BACK:  symmetric  LUNGS:  No increased work of breathing, good air exchange, clear to auscultation bilaterally, no crackles or wheezing  CARDIOVASCULAR:  Normal apical impulse, regular rate and rhythm, normal S1 and S2, no S3 or S4  ABDOMEN:  No scars, normal bowel sounds, soft, warm, non-distended, mild to moderate/tenderness LUQ/LLQ,   GENITAL/URINARY: Deferred  MUSCULOSKELETAL:  There is no redness, warmth, or swelling of the joints. Full range of motion noted. Motor strength is 5 out of 5 all extremities bilaterally. Tone is normal. Small wound, old, crusting on LLE. NEUROLOGIC:  Awake, alert, oriented to name, place and time. Cranial nerves II-XII are grossly intact. SKIN: Pale       MEDICATIONS:    Scheduled Meds:   atorvastatin  40 mg Oral Daily    levothyroxine  88 mcg Oral Daily    magnesium sulfate  2,000 mg IntraVENous Once    sodium chloride flush  5-40 mL IntraVENous 2 times per day    cefTRIAXone (ROCEPHIN) IV  1,000 mg IntraVENous Q24H    insulin lispro  0-4 Units SubCUTAneous Q6H    predniSONE  80 mg Oral Daily     Continuous Infusions:   sodium chloride      sodium chloride      sodium chloride 125 mL/hr at 01/23/23 5162    pantoprozole (PROTONIX) infusion 8 mg/hr (01/23/23 3144)    octreotide (SandoSTATIN) infusion 50 mcg/hr (01/23/23 0633)    dextrose       PRN Meds:   sodium chloride, , PRN  sodium chloride flush, 5-40 mL, PRN  sodium chloride, , PRN  ondansetron, 4 mg, Q8H PRN   Or  ondansetron, 4 mg, Q6H PRN  polyethylene glycol, 17 g, Daily PRN  acetaminophen, 650 mg, Q6H PRN   Or  acetaminophen, 650 mg, Q6H PRN  potassium chloride, 20 mEq, PRN   Or  potassium chloride, 10 mEq, PRN  albuterol, 2.5 mg, Q2H PRN  glucose, 4 tablet, PRN  dextrose bolus, 125 mL, PRN   Or  dextrose bolus, 250 mL, PRN  glucagon (rDNA), 1 mg, PRN  dextrose, , Continuous PRN          VENT SETTINGS (Comprehensive) (if applicable): Additional Respiratory Assessments  Heart Rate: 74  Resp: 13  SpO2: 94 %    Arterial Blood Gas 1/23/2023  No results for input(s): PH, PCO2, PO2, HCO3, BE, O2SAT in the last 72 hours.       Laboratory findings:    Complete Blood Count:   Recent Labs     01/22/23  1628 01/22/23  1754 01/22/23  2056 01/23/23  0315 01/23/23  0413   WBC 3.3*  --   --   --  2.3*   HGB 8.0*   < > 7.7* 7.6* 7.8*   HCT 23.5*   < > 23.1* 23.0* 23.5*   PLT 34*  --   --   --  24*    < > = values in this interval not displayed. Last 3 Blood Glucose:   Recent Labs     01/22/23  1628 01/23/23  0413   GLUCOSE 277* 185*        PT/INR:    Lab Results   Component Value Date/Time    PROTIME 16.1 01/23/2023 04:13 AM    INR 1.4 01/23/2023 04:13 AM     PTT:    Lab Results   Component Value Date/Time    APTT 28.9 01/22/2023 04:28 PM       Comprehensive Metabolic Profile:   Recent Labs     01/22/23  1628 01/23/23 0413    139   K 4.4 4.4    108*   CO2 22 23   BUN 44* 36*   CREATININE 0.7 0.9   GLUCOSE 277* 185*   CALCIUM 9.4 9.0   PROT 5.9* 5.4*   LABALBU 3.7 3.5   BILITOT 0.8 0.8   ALKPHOS 48 43   AST 16 19   ALT 18 20      Magnesium:   Lab Results   Component Value Date/Time    MG 1.3 01/23/2023 04:13 AM     Phosphorus:   Lab Results   Component Value Date/Time    PHOS 2.8 01/23/2023 04:13 AM     Ionized Calcium: No results found for: CAION     Urinalysis:     Troponin: No results for input(s): TROPONINI in the last 72 hours. Microbiology:    Radiology/Imaging:     Chest Xray (1/23/2023):  XR CHEST PORTABLE   Final Result   No acute process. CT ABDOMEN PELVIS W IV CONTRAST Additional Contrast? None   Final Result   Cirrhosis with portal hypertension manifesting as splenomegaly, ascending   colon wall thickening, small volume of ascites and collateral vessels   including esophageal varices. Diverticulosis without evidence of acute diverticulitis. ASSESSMENT:     Patient Active Problem List    Diagnosis Date Noted    Thrombocythemia 01/23/2023    Chronic ITP (idiopathic thrombocytopenic purpura) (HCC) 01/23/2023    Acute blood loss anemia 01/23/2023    Upper GI bleed 01/22/2023    Esophageal bleed, non-variceal 01/22/2023         PLAN:     Neuro   Hx of stroke  She suffered a stroke in January 2012.  It was felt to be an embolic stroke from right carotid artery disease and she underwent right carotid endarterectomy in March of 2012. Stop Plavix  Continue statin     Respiratory   No active problems      Cardiovascular   Hx HTN  Hold irbesartan    Gastrointestinal   GI bleed  Likely esophageal varices related to below. S/p Sandostatin, Rocephin. Protonix bolus, 2 PRBC in  ED  Continue Sandostatin and Protonix drip  Continue to monitor H/H  Gi scope at 4pm 01/23  Further work-up and management per GI    Cryptogenic cirrhosis  Splenomegaly and subsequent portal hypertension found on recent scans at [facility]  CT scan again demonstrates cirrhotic morphology of the liver significantly enlarged spleen measuring 21 cm. Mild edematous wall thickening of the ascending colon is noted, likely related to portal hypertension. Small volume of ascites and collateral vessels including esophageal varices  Negative Hepatitis panel- December at [facility]  Not decompensated LFTs within normal as well as normal ammonia lvl. INR 1.4  Continue to monitor LFTs and daily INR, appreciate GI input     Renal   No active problems  BUN/Cr 44/0.7     Infectious Disease   Acute cystitis with hematuria  UA positive nitrates as well as small leukocyte  White count 10-20  Follow blood and urine cultures check baseline CRP and  Pro-Ty  Rocephin 1g day 2     Hematology/Oncology   Acute blood loss anemia   Hgb 8.0-->6.6-->7.7.  2/2   Underlying iron deficiency anemia, does not tolerate oral replacements was ordered Venofer 300 mg IV x 2 doses in December 2022  Received 2 units of PRBC and 1 u PLTs in ED, recheck Hgb  Transfuse for Hgb <7    Pancytopenia  Leukopenia/anemia likely due to her underlying liver disease/cirrhosis  Thrombocytopenia likely due to her presumed ITP  Most recent labs from [facility]   1/18: WBC 1.9, Hgb 10.1, platelet count 27    Presumed ITP  Increased megakaryocytes noted on recent bone marrow biopsy  Has not started Promacta which was prescribed recently for platelet goal > 50 K, she was waiting until Hepatology follow up next week. Per chart review looks like they possibly switch this to Doptelet 20 mg po daily on 1/20/23  Platelet count 27 on 1/18, 34 today  Follows with hem/onc and has hepatology appt. at 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     Endocrine   Type II DM  Well-controlled last A1c 7.0  Low-dose sliding scale every 6 hours while n.p.o. Hypothyroidism  Continue synthroid  Last TSH 5.44 12/1/22     Social/Spiritual/DNR/Other   Status: Ful  Diet Diet NPO  Stress ulcer prophylaxis: Protonix  DVT prophylaxis: intermittent pneumatic compression boots  Consultations needed: Yes  Transfer out of ICU today? No    Lines/catheters  Date 01/22  Peripheral IVs    Patient remains critically ill and requiring ICU level care. Sonya Dimas MD  7:17 AM  01/23/23    I personally saw, examined and provided care for the patient. Radiographs, labs and medication list were reviewed by me independently. Review of Residents documentation was conducted and revisions were made as appropriate. I agree with the above documented exam, problem list and plan of care. Remains critically ill for GI bleed. Possible cirrhosis concern for variceal bleed. Plan on GI for EGD today. History of ITP and cirrhosis.     CCT excluding procedures 34 minutes    Dominik Sings, DO

## 2023-01-23 NOTE — PROGRESS NOTES
[facility] Progress Note    Admitting Date and Time: 1/22/2023  3:43 PM  Admit Dx: Upper GI bleed [K92.2]  Esophageal bleed, non-variceal [K22.89]    Subjective:  Ms. Theodore Rae is a 53-year-old female with past medical history significant for stroke, type II DM, hypertension, cirrhosis, portal hypertension, esophageal varices, ITP, hypothyroidism who presents with acute blood loss anemia and concern for variceal bleed. No acute events overnight. Patient was admitted to the ICU for close monitoring. GI and hematology have been consulted. GI planning for upper endoscopy later today. Patient is currently NPO.'s complaints this morning including chest pain, shortness of breath, nausea, vomiting, headache. ROS: denies fever, chills, cp, sob, n/v, HA unless stated above.       atorvastatin  40 mg Oral Daily    levothyroxine  88 mcg Oral Daily    methylPREDNISolone  60 mg IntraVENous Daily    pantoprazole (PROTONIX) 40 mg injection  40 mg IntraVENous Q12H    sodium chloride flush  5-40 mL IntraVENous 2 times per day    cefTRIAXone (ROCEPHIN) IV  1,000 mg IntraVENous Q24H    insulin lispro  0-4 Units SubCUTAneous Q6H     sodium chloride, , PRN  sodium chloride, , PRN  sodium chloride flush, 5-40 mL, PRN  sodium chloride, , PRN  ondansetron, 4 mg, Q8H PRN   Or  ondansetron, 4 mg, Q6H PRN  polyethylene glycol, 17 g, Daily PRN  acetaminophen, 650 mg, Q6H PRN   Or  acetaminophen, 650 mg, Q6H PRN  potassium chloride, 20 mEq, PRN   Or  potassium chloride, 10 mEq, PRN  albuterol, 2.5 mg, Q2H PRN  glucose, 4 tablet, PRN  dextrose bolus, 125 mL, PRN   Or  dextrose bolus, 250 mL, PRN  glucagon (rDNA), 1 mg, PRN  dextrose, , Continuous PRN         Objective:    BP (!) 124/50   Pulse 67   Temp 97.7 °F (36.5 °C) (Oral)   Resp 19   Ht 5' 10\" (1.778 m)   Wt 216 lb 7.9 oz (98.2 kg)   SpO2 96%   BMI 31.06 kg/m²     General Appearance: alert and oriented to person, place and time and in no acute distress  Skin: warm and dry  Head: normocephalic and atraumatic  Eyes: pupils equal, round, and reactive to light, extraocular eye movements intact, conjunctivae normal  Neck: neck supple and non tender without mass   Pulmonary/Chest: clear to auscultation bilaterally- no wheezes, rales or rhonchi, normal air movement, no respiratory distress  Cardiovascular: normal rate, normal S1 and S2 and no carotid bruits  Abdomen: soft, non-tender, non-distended, normal bowel sounds, no masses or organomegaly  Extremities: no cyanosis, no clubbing and no edema  Neurologic: no cranial nerve deficit and speech normal        Recent Labs     01/22/23  1628 01/23/23  0413    139   K 4.4 4.4    108*   CO2 22 23   BUN 44* 36*   CREATININE 0.7 0.9   GLUCOSE 277* 185*   CALCIUM 9.4 9.0       Recent Labs     01/22/23  1628 01/22/23  1754 01/22/23  2056 01/23/23  0315 01/23/23  0413   WBC 3.3*  --   --   --  2.3*   RBC 2.52*  --   --   --  2.59*   HGB 8.0*   < > 7.7* 7.6* 7.8*   HCT 23.5*   < > 23.1* 23.0* 23.5*   MCV 93.3  --   --   --  90.7   MCH 31.7  --   --   --  30.1   MCHC 34.0  --   --   --  33.2   RDW 13.9  --   --   --  14.6   PLT 34*  --   --   --  24*   MPV 11.9  --   --   --  11.8    < > = values in this interval not displayed. Radiology: No new imaging to report. Assessment:    Principal Problem:    Upper GI bleed  Active Problems:    Esophageal bleed, non-variceal    Thrombocythemia    Chronic ITP (idiopathic thrombocytopenic purpura) (HCC)    Acute blood loss anemia  Resolved Problems:    * No resolved hospital problems. *      Plan:  1. Upper GI bleed, concern for variceal bleed-n.p.o., continue octreotide infusion, IV Protonix 40 mg every 12 hours, planning on EGD with possible banding later today with GI.  2.  Acute blood loss anemia-hemoglobin 6.6 on presentation, s/p 2 units PRBCs. Continue to trend hemoglobin with H/H every 6 hours. Follow-up findings from EGD later today.   3.  Acute complicated UTI plan continue IV ceftriaxone 1000 mg every 24 hours, follow urine culture. 4.  Pancytopenia-s/p 1 pack of platelets, hematology consulted. 5.  ITP-platelets 60O, s/p 1 pack of platelets appreciate hematology recommendations. 6.  DM type II on insulin lispro subcu, hypoglycemic precautions, Accu-Cheks every 4 hours while n.p.o.  7.  Cryptogenic cirrhosis with known esophageal varices-EGD today as above. 8.  History of stroke hold Plavix, continue statin  9. History of right parotid carcinoma s/p right subtotal parotidectomy and radiation therapy (2012, 2013)-follows with the [facility] [employer] [facility]  10. Hypothyroidism-continue home Synthroid  11. Hypertension-hold irbesartan  12. Obesity class I- follow-up with PCP for diet and lifestyle modifications. NOTE: This report was transcribed using voice recognition software. Every effort was made to ensure accuracy; however, inadvertent computerized transcription errors may be present.   Electronically signed by Elana Dominguez MD on 1/23/2023 at 11:26 AM

## 2023-01-23 NOTE — CONSULTS
Critical Care Admit/Consult Note         Patient - Rayshawn Gonzalez   MRN -  [id number]   Raghu # - [de-identified]   - 1960      Date of Admission -  2023  3:43 PM  Date of evaluation -  2023  0205/0205-A   Hospital Day - 1            ADMIT/CONSULT DETAILS     Reason for Admit/Consult   Upper GI bleed    Consulting Sariah Lopez MD  Primary Care Physician - Evelyn Aragon MD         \[facility]\""   The patient is a 57 y.o. female with significant past medical history of stroke, carotid carcinoma, type II DM, HTN, cirrhosis, portal hypertension, esophageal varices, ITP, and hypothyroidism. Patient presented to ED today for reported episode of hematemesis with blood clots bright red blood as well as melena beginning evening of 23 and worsening throughout the day today. Patient vital signs have been hemodynamically stable throughout ED course. Initial Hgb was 8.0 patient did have 1 episode of melena and ED repeat Hgb was 6.6. She received a total of 2 units of PRBC in ED. UA was dirty and she was given 1 g Rocephin IV for presumed UTI. CT of her abdomen and pelvis was reviewed and again reveals cirrhosis and portal hypertension as well as splenomegaly and esophageal varices. GI was consulted in ED will see patient in the morning. She was given Sandostatin and Protonix bolus and initiated on infusions. Other labs significant for pancytopenia (please see below) which is being worked up at [address] [place] [address] and thought to be due to ITP. Patient at this time is electing not to have central line placed. Did discuss with her that if she became hemodynamically unstable or would  have another acute drop in hemoglobin a central line would be indicated and beneficial.  She is agreeable to this. Patient will be managed overnight in ICU. Will transfuse as needed. Awaiting GI recommendations with likely scope in the morning.        Information obtained from care everywhere [facility]: HX of ITP 25 years ago 7 weeks after the birth of her son. she suffered a stroke in January 2012. It is felt to be an embolic stroke from a right carotid artery disease and she has undergone right carotid endarterectomy in March of 2012. She has not had other DVTs or PEs. There is no family history of thrombosis\". Recently experiencing thrombocytopenia with extensive work-up done at [facility] clinic including 2 separate bone marrow biopsies, T-cell clonality testing, extensive work-up for pancytopenia. Exact etiology never found including no significant findings on NGS panel. Increased episodes of epistaxis which required 30 to 60 minutes of holding pressure. She was having worsening cytopenias recent bone marrow biopsy November significantly unremarkable. Was found to have cirrhotic liver morphology with subsequent significant splenomegaly at 24 cm. They proceeded with treatment for presumed ITP. Was started on daily Promacta 25 mg with platelet goal of 50 K however, she never started this and it was switched to Doptelet 20 mg po daily on 1/20/23 still awaiting approval from insurance.   Also has underlying component of iron deficiency anemia       Past Medical History         Diagnosis Date    Cerebral artery occlusion with cerebral infarction ([address]     Diabetes mellitus ([facility] [address]         Past Surgical History           Procedure Laterality Date    CHOLECYSTECTOMY         Current Medications   Current Medications    atorvastatin  40 mg Oral Daily    levothyroxine  88 mcg Oral Daily    sodium chloride flush  5-40 mL IntraVENous 2 times per day    cefTRIAXone (ROCEPHIN) IV  1,000 mg IntraVENous Q24H    insulin lispro  0-4 Units SubCUTAneous Q6H    predniSONE  80 mg Oral Daily     sodium chloride, sodium chloride flush, sodium chloride, ondansetron **OR** ondansetron, polyethylene glycol, acetaminophen **OR** acetaminophen, potassium chloride **OR** potassium chloride, albuterol, glucose, dextrose bolus **OR** dextrose bolus, glucagon (rDNA), dextrose  IV Drips/Infusions   sodium chloride      sodium chloride      sodium chloride 125 mL/hr at 01/23/23 0046    pantoprozole (PROTONIX) infusion 8 mg/hr (01/22/23 4200)    octreotide (SandoSTATIN) infusion 50 mcg/hr (01/23/23 0047)    dextrose       Home Medications  Medications Prior to Admission: levothyroxine (SYNTHROID) 88 MCG tablet, Take 1 tablet Monday through Saturday, 1.5 tablets on Sunday  Dulaglutide (TRULICITY) 1.5 VU/5.1GG SOPN, Inject 1.5 mg into the skin once a week  atorvastatin (LIPITOR) 40 MG tablet, Take 40 mg by mouth daily  clopidogrel (PLAVIX) 75 MG tablet,   ferrous sulfate (IRON 325) 325 (65 Fe) MG tablet, Take 325 mg by mouth daily (with breakfast)  irbesartan (AVAPRO) 150 MG tablet,   metFORMIN (GLUCOPHAGE) 1000 MG tablet, Take 1 tablet by mouth 2 times daily (with meals)  TRESIBA FLEXTOUCH 100 UNIT/ML SOPN, Inject 48 units subcutaneous  at night    Diet/Nutrition   Diet NPO    Allergies   Shellfish-derived products, Bacitracin, Codeine, Penicillins, Azithromycin, Erythromycin, and Metoprolol    Social History   Tobacco   reports that she has never smoked. She has never used smokeless tobacco.    Alcohol     reports no history of alcohol use. Occupational history :    Family History   History reviewed. No pertinent family history.     Sleep History   n/a    ROS     REVIEW OF SYSTEMS:  CONSTITUTIONAL:  positive for  fatigue  EYES:  negative  HEENT:  negative  RESPIRATORY:  negative  CARDIOVASCULAR:  negative  GASTROINTESTINAL:  positive for nausea, vomiting, abdominal pain, hematemesis, and hemtochezia  GENITOURINARY:  negative  INTEGUMENT/BREAST:  negative  HEMATOLOGIC/LYMPHATIC:  negative  ALLERGIC/IMMUNOLOGIC:  negative  ENDOCRINE:  negative  MUSCULOSKELETAL:  negative  NEUROLOGICAL:  negative  BEHAVIOR/PSYCH:  negative    Lines and Devices   PIV's    Mechanical Ventilation Data   VENT SETTINGS (Comprehensive)     Additional Respiratory Assessments  Heart Rate: 69  Resp: 19  SpO2: 95 %    ABG  No results found for: PH, PCO2, PO2, HCO3, O2SAT  No results found for: IFIO2, MODE, SETTIDVOL, SETPEEP        Vitals    height is 5' 10\" (1.778 m) and weight is 210 lb (95.3 kg). Her oral temperature is 98.4 °F (36.9 °C). Her blood pressure is 118/48 (abnormal) and her pulse is 69. Her respiration is 19 and oxygen saturation is 95%. Temperature Range: Temp: 98.4 °F (36.9 °C) Temp  Av.1 °F (36.7 °C)  Min: 97.4 °F (36.3 °C)  Max: 98.4 °F (36.9 °C)  BP Range:  Systolic (44QTQ), AFK:575 , Min:108 , QKJ:234     Diastolic (99DQV), VFR:65, Min:48, Max:92    Pulse Range: Pulse  Av.7  Min: 69  Max: 89  Respiration Range: Resp  Av.2  Min: 17  Max: 20  Current Pulse Ox[de-identified]  SpO2: 95 %  24HR Pulse Ox Range:  SpO2  Av.3 %  Min: 94 %  Max: 99 %  Oxygen Amount and Delivery:        I/O (24 Hours)    Patient Vitals for the past 8 hrs:   BP Temp Temp src Pulse Resp SpO2   23 2316 (!) 118/48 98.4 °F (36.9 °C) Oral 69 19 95 %   23 (!) 113/49 97.4 °F (36.3 °C) -- 72 17 96 %   23 (!) 108/49 -- -- 74 20 96 %   23 (!) 116/55 98.2 °F (36.8 °C) Oral -- -- --   23 (!) 110/48 98.4 °F (36.9 °C) Oral 78 17 94 %   23 (!) 112/51 98.1 °F (36.7 °C) Oral 78 18 98 %       Intake/Output Summary (Last 24 hours) at 2023 0132  Last data filed at 2023 2316  Gross per 24 hour   Intake 1700 ml   Output --   Net 1700 ml     I/O last 3 completed shifts:   In: 1000 [IV Piggyback:1000]  Out: -        Patient Vitals for the past 96 hrs (Last 3 readings):   Weight   23 1550 210 lb (95.3 kg)         Drains/Tubes Outputs    Exam         PHYSICAL EXAM:  CONSTITUTIONAL:  awake, alert, cooperative, no apparent distress, and appears stated age  EYES: Periorbital puffiness, pupils equal, round and reactive to light, extra ocular muscles intact, sclera clear, conjunctiva normal  ENT:  Normocephalic, without obvious abnormality, atraumatic, sinuses nontender on palpation, external ears without lesions, oral pharynx with moist mucus membranes  NECK: Supple, symmetrical, no adenopathy, surgical scar right side  HEMATOLOGIC/LYMPHATICS:  no cervical lymphadenopathy  BACK:  symmetric  LUNGS:  No increased work of breathing, good air exchange, clear to auscultation bilaterally, no crackles or wheezing  CARDIOVASCULAR:  Normal apical impulse, regular rate and rhythm, normal S1 and S2, no S3 or S4  ABDOMEN:  No scars, normal bowel sounds, soft, warm, non-distended, mild to moderate/tenderness LUQ/LLQ,   GENITAL/URINARY: Deferred  MUSCULOSKELETAL:  There is no redness, warmth, or swelling of the joints. Full range of motion noted. Motor strength is 5 out of 5 all extremities bilaterally. Tone is normal.  NEUROLOGIC:  Awake, alert, oriented to name, place and time. Cranial nerves II-XII are grossly intact.    SKIN: Pale and flushed in the face    Data   Old records and images have been reviewed    Lab Results   CBC     Lab Results   Component Value Date/Time    WBC 3.3 01/22/2023 04:28 PM    RBC 2.52 01/22/2023 04:28 PM    HGB 7.7 01/22/2023 08:56 PM    HCT 23.1 01/22/2023 08:56 PM    PLT 34 01/22/2023 04:28 PM    MCV 93.3 01/22/2023 04:28 PM    MCH 31.7 01/22/2023 04:28 PM    MCHC 34.0 01/22/2023 04:28 PM    RDW 13.9 01/22/2023 04:28 PM    LYMPHOPCT 13.5 01/22/2023 04:28 PM    MONOPCT 6.8 01/22/2023 04:28 PM    BASOPCT 0.3 01/22/2023 04:28 PM    MONOSABS 0.22 01/22/2023 04:28 PM    LYMPHSABS 0.44 01/22/2023 04:28 PM    EOSABS 0.02 01/22/2023 04:28 PM    BASOSABS 0.01 01/22/2023 04:28 PM       BMP   Lab Results   Component Value Date/Time     01/22/2023 04:28 PM    K 4.4 01/22/2023 04:28 PM     01/22/2023 04:28 PM    CO2 22 01/22/2023 04:28 PM    BUN 44 01/22/2023 04:28 PM    CREATININE 0.7 01/22/2023 04:28 PM    GLUCOSE 277 01/22/2023 04:28 PM    CALCIUM 9.4 01/22/2023 04:28 PM       LFTS  Lab Results   Component Value Date/Time    ALKPHOS 48 01/22/2023 04:28 PM    ALT 18 01/22/2023 04:28 PM    AST 16 01/22/2023 04:28 PM    PROT 5.9 01/22/2023 04:28 PM    BILITOT 0.8 01/22/2023 04:28 PM    BILIDIR 0.2 01/22/2023 04:28 PM    IBILI 0.6 01/22/2023 04:28 PM    LABALBU 3.7 01/22/2023 04:28 PM       INR  Recent Labs     01/22/23  1628   PROTIME 16.2*   INR 1.4       APTT  Recent Labs     01/22/23  1628   APTT 28.9       Lactic Acid  No results found for: LACTA     BNP   No results for input(s): BNP in the last 72 hours. Cultures     No results for input(s): BC in the last 72 hours. No results for input(s): Omid Puff in the last 72 hours. No results for input(s): LABURIN in the last 72 hours. Radiology   CXR  Pending     CT Scans  CT ABD/PELVIS  Cirrhosis with portal hypertension manifesting as splenomegaly  Ascending colon wall thickening, small volume of ascites and collateral vessels including esophageal varices  Diverticulosis without evidence of acute diverticulitis    SYSTEMS ASSESSMENT    Neuro   # H/x of stroke  - She suffered a stroke in January 2012. It was felt to be an embolic stroke from right carotid artery disease and she underwent right carotid endarterectomy in March of 2012. - Stop Plavix. Continue statin    Respiratory   # No active problems     Cardiovascular   # HTN  - Hold irbesartan  - Check baseline EKG, CXR, and probably    Gastrointestinal   # GI bleed  - Likely esophageal varices related to below.   Reports episode of hematemesis with clots and bright red blood as well as melena  - Given Sandostatin and Protonix bolus in ED as well as 2 PRBC  - Mild abdominal tenderness on Rocephin  - GI consulted in ED; will see her in the morning  - Continue Sandostatin and Protonix drip  - Further work-up and management per GI    # Cryptogenic cirrhosis  - Splenomegaly and subsequent portal hypertension found on recent scans at [facility] [place]  - CT scan again demonstrates cirrhotic morphology of the liver significantly enlarged spleen measuring 21 cm. Mild edematous wall thickening of the ascending colon is noted, likely related to portal hypertension. Small volume of ascites and collateral vessels including esophageal varices  - Unclear etiology of cirrhosis, may be CORONEL, was referred to the hepatology in [facility] for further workup. Had hepatitis panel drawn in December at [facility]  - Not decompensated LFTs within normal as well as normal ammonia lvl. INR 1.4  - Continue to monitor LFTs and daily INR, appreciate GI input    Renal   # No active problems  - BUN/Cr 44/0.7     Infectious Disease   # Acute cystitis with hematuria  - UA positive nitrates as well as small leukocyte Estrace. White count 10-20  - Follow blood and urine cultures check baseline CRP and  Pro-Ty    Hematology/Oncology   # Acute blood loss anemia   - Hgb 8.0-->6.6-->7.7. 2/2 GI bleed, does have underlying iron deficiency anemia, does not tolerate oral replacements was ordered Venofer 300 mg IV x 2 doses in December 2022  - Received 2 units of PRBC in ED, recheck Hgb  -Transfuse for Hgb < 7    # Pancytopenia  - Phuong Alexander likely due to her underlying liver disease/cirrhosis  - Thrombocytopenia likely due to her presumed ITP  - Most recent labs from [facility] 1/18: WBC 1.9, Hgb 10.1, platelet count 27    # Presumed ITP  - Increased megakaryocytes noted on recent bone marrow biopsy  - Has not started Promacta which was prescribed recently for platelet goal > 50 K, is waiting until Hepatology follow up next week. Per chart review looks like they possibly switch this to Doptelet 20 mg po daily on 1/20/23  - Platelet count 27 on 1/18, 34 today  - Follows with hem/onc and has hepatology appt.  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    # Hypothyroidism  - Continue synthroid  - Last TSH 5.44 12/1/22    Social/Spiritual/DNR/Other   Status: Full  DVT prophylaxis: SCDs  PPI: Protonix      \"Thank you for asking us to see this complex patient. \"     Case discussed with Dr. Suellen Donahue  Electronically signed by FATOUMATA Cantu - CNP on 1/23/2023 at 1:32 AM    Total critical care time caring for this patient with life threatening, unstable organ failure, including direct patient contact, management of life support systems, review of data including imaging and labs, discussions with other team members and physicians at least 39  Min so far today, excluding procedures. Please feel free to call with questions or concerns. NOTE: This report was transcribed using voice recognition software. Every effort was made to ensure accuracy; however, inadvertent computerized transcription errors may be present.

## 2023-01-23 NOTE — PROGRESS NOTES
Report given to nurse Downs. Pt transported back to room with transport aide and nurse Downs, on monitor.

## 2023-01-23 NOTE — CONSULTS
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 She had marrow evaluation in 2015 and 2017 and peripheral blood for myeloid NGS in 2018. Since that time worsening cytopenias. She then had a another marrow in November 2022 which showed normocellular marrow without acute abnormalities, increased megakaryocytes noted. PET scan was largely unrevealing although did show cirrhotic liver morphology with subsequent significant splenomegaly at 24 cm. Given relatively bland marrow she was started on treatment for presumed ITP with Doptelet 20mg daily. 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 Patient presented to the ED for evaluation of hematemesis and black stool ongoing for 24 hours. CT abd revealed cirrhosis and portal hypertension with splenomegaly and esophageal varices. CBC revealed hgb 6.6 patient s/p 1 unit of pRBC's. 2 more ordered. Positive UA on Rocephin. GI and General surgery following patient for EGD with possible banding today. ICU for further care. AFP, ROSETTA, CEA, iron profile, IG's, hepatitis panel pending. CMP with BUN 36, Mg 1.3. LFT's unremarkable. CBC with WBC 2.3, hgb 7.8 s/p 1 unit of pRBC's yesterday, platelets 24. Consultation for pancytopenia presumed ITP.      Review of systems: Over 10 systems were reviewed and all were negative except as mentioned above.      Diagnostic Data:     Past Medical History:   Diagnosis Date    Cerebral artery occlusion with cerebral infarction ([facility] [address]     Diabetes mellitus ([facility] [address]        Patient Active Problem List    Diagnosis Date Noted    Thrombocythemia 01/23/2023    Chronic ITP (idiopathic thrombocytopenic purpura) ([facility] [address] 01/23/2023    Acute blood loss anemia 01/23/2023    Upper GI bleed 01/22/2023    Esophageal bleed, non-variceal 01/22/2023        Past Surgical History:   Procedure Laterality Date    CHOLECYSTECTOMY         Family History  History reviewed. No pertinent family history. Social History    TOBACCO:   reports that she has never smoked. She has never used smokeless tobacco.  ETOH:   reports no history of alcohol use. Home Medications  Prior to Admission medications    Medication Sig Start Date End Date Taking? Authorizing Provider   levothyroxine (SYNTHROID) 88 MCG tablet Take 1 tablet Monday through Saturday, 1.5 tablets on Sunday 12/12/22   Kay Orf, APRN - CNS   Dulaglutide (TRULICITY) 1.5 CN/9.7MK SOPN Inject 1.5 mg into the skin once a week 7/6/22   Kay Orf, APRN - CNS   atorvastatin (LIPITOR) 40 MG tablet Take 40 mg by mouth daily    Historical Provider, MD   clopidogrel (PLAVIX) 75 MG tablet  1/10/22   Historical Provider, MD   ferrous sulfate (IRON 325) 325 (65 Fe) MG tablet Take 325 mg by mouth daily (with breakfast) 3/7/22 6/5/22  Historical Provider, MD   irbesartan (AVAPRO) 150 MG tablet  2/20/22   Historical Provider, MD   metFORMIN (GLUCOPHAGE) 1000 MG tablet Take 1 tablet by mouth 2 times daily (with meals) 4/6/22   Kay Orf, APRN - CNS   TRESIBA FLEXTOUCH 100 UNIT/ML SOPN Inject 48 units subcutaneous  at night 4/6/22   Kay Orf, APRN - CNS       Allergies  Allergies   Allergen Reactions    Shellfish-Derived Products Hives    Bacitracin Itching    Codeine      Other reaction(s):  Other: See Comments, Unknown  Tingling arms, heart palpitations      Penicillins      Other reaction(s): Unknown  In childhood      Azithromycin Rash    Erythromycin Rash    Metoprolol Rash         Objective  /67   Pulse 73   Temp 98.1 °F (36.7 °C) (Oral)   Resp 24   Ht 5' 10\" (1.778 m)   Wt 216 lb 7.9 oz (98.2 kg)   SpO2 97%   BMI 31.06 kg/m²     Physical Exam:   Performance Status:  General: AAO to person, place, time, in no acute distress,   Head and neck : PERRLA, EOMI . Sclera non icteric. Oropharynx : Clear  Neck: no JVD,  no adenopathy  Heart: Regular rate and regular rhythm, no murmur  Lungs: Clear to auscultation   Extremities: No edema,no cyanosis, no clubbing. Abdomen: Soft, non-tender;no masses, no organomegaly  Skin:  No rash. Neurologic:Cranial nerves grossly intact. No focal motor or sensory deficits .     Recent Laboratory Data-   Lab Results   Component Value Date    WBC 2.3 (L) 01/23/2023    HGB 7.8 (L) 01/23/2023    HCT 23.5 (L) 01/23/2023    MCV 90.7 01/23/2023    PLT 24 (L) 01/23/2023    LYMPHOPCT 12.7 (L) 01/23/2023    RBC 2.59 (L) 01/23/2023    MCH 30.1 01/23/2023    MCHC 33.2 01/23/2023    RDW 14.6 01/23/2023    NEUTOPHILPCT 81.7 (H) 01/23/2023    MONOPCT 4.4 01/23/2023    BASOPCT 0.4 01/23/2023    NEUTROABS 1.87 01/23/2023    LYMPHSABS 0.29 (L) 01/23/2023    MONOSABS 0.10 01/23/2023    EOSABS 0.01 (L) 01/23/2023    BASOSABS 0.01 01/23/2023       Lab Results   Component Value Date     01/23/2023    K 4.4 01/23/2023     (H) 01/23/2023    CO2 23 01/23/2023    BUN 36 (H) 01/23/2023    CREATININE 0.9 01/23/2023    GLUCOSE 185 (H) 01/23/2023    CALCIUM 9.0 01/23/2023    PROT 5.4 (L) 01/23/2023    LABALBU 3.5 01/23/2023    BILITOT 0.8 01/23/2023    ALKPHOS 43 01/23/2023    AST 19 01/23/2023    ALT 20 01/23/2023    LABGLOM >60 01/23/2023    GFRAA >60 01/18/2022       No results found for: IRON, TIBC, FERRITIN        Radiology-    CT ABDOMEN PELVIS W IV CONTRAST Additional Contrast? None    Result Date: 1/22/2023  EXAMINATION: CT OF THE ABDOMEN AND PELVIS WITH CONTRAST 1/22/2023 3:34 pm TECHNIQUE: CT of the abdomen and pelvis was performed with the administration of intravenous contrast. Multiplanar reformatted images are provided for review. Automated exposure control, iterative reconstruction, and/or weight based adjustment of the mA/kV was utilized to reduce the radiation dose to as low as reasonably achievable. COMPARISON: None. HISTORY: ORDERING SYSTEM PROVIDED HISTORY: abd pain, rectal bleeding TECHNOLOGIST PROVIDED HISTORY: Reason for exam:->abd pain, rectal bleeding Additional Contrast?->None Decision Support Exception - unselect if not a suspected or confirmed emergency medical condition->Emergency Medical Condition (MA) FINDINGS: Lower Chest:  Visualized portion of the lower chest demonstrates no acute abnormality.  Distal esophageal varices are noted. Organs: The liver demonstrates a cirrhotic morphology with hypertrophy of the caudate lobe and left lateral segment as well as a nodular surface. A recanalized paraumbilical vein is seen.  The portal vein is patent.  Status post cholecystectomy.  No biliary ductal dilatation seen.  Spleen is significantly enlarged measuring 21.0 cm.  Prominent perisplenic collateral vessels are noted.  The pancreas and adrenal glands are unremarkable. The kidneys enhance symmetrically without evidence of hydronephrosis. GI/Bowel: Stomach and duodenal sweep demonstrate no acute abnormality. There is no evidence of bowel obstruction.  No evidence of abnormal bowel wall thickening or distension.  Mild edematous wall thickening of the ascending colon is noted, likely related to portal hypertension. The appendix is visualized and is unremarkable.  No evidence of acute appendicitis. There is diverticulosis without evidence of diverticulitis. Pelvis:  Bladder is unremarkable in appearance.  The uterus and adnexa are without acute abnormality. Peritoneum/Retroperitoneum: A small volume of ascites is seen, predominantly in the perihepatic  and perisplenic locations. No free air is noted. No evidence of lymphadenopathy. Aorta is normal in caliber. Bones/Soft Tissues:  No acute abnormality of the visualized osseous structures. Cirrhosis with portal hypertension manifesting as splenomegaly, ascending colon wall thickening, small volume of ascites and collateral vessels including esophageal varices. Diverticulosis without evidence of acute diverticulitis. XR CHEST PORTABLE    Result Date: 1/23/2023  EXAMINATION: ONE XRAY VIEW OF THE CHEST 1/23/2023 5:52 am COMPARISON: None. HISTORY: ORDERING SYSTEM PROVIDED HISTORY: SOB TECHNOLOGIST PROVIDED HISTORY: Reason for exam:->SOB FINDINGS: The lungs are without acute focal process. There is no effusion or pneumothorax. The cardiomediastinal silhouette is without acute process. The osseous structures are without acute process. No acute process. 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 Workup as above. Started on treatment for presumed ITP with Doptelet 20mg daily. 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 - pancytopenia presumed ITP    - Hematemesis and black stool ongoing for 24 hours. - CT abd revealed cirrhosis and portal hypertension with splenomegaly and esophageal varices. - CBC revealed hgb 6.6 patient s/p 1 unit of pRBC's. 2 more ordered. - Positive UA on Rocephin.   - GI and General surgery following patient for EGD with possible banding today. - ICU for further care. - AFP, ROSETTA, CEA, iron profile, IG's, hepatitis panel pending. CMP with BUN 36, Mg 1.3. LFT's unremarkable. - CBC with WBC 2.3, hgb 7.8 s/p 1 unit of pRBC's yesterday, platelets 24. Attending addendum:   Thrombocytopenia most likely seems to be due to underlying cirrhosis and hypersplenism with superimposed consumption due to variceal bleed. Patient however has diagnosis of ITP made in the past from [facility]. S/p 2 units of platelet transfusion without any increment in platelet count. Will do dexamethasone 40 mg daily for 4 doses for ITP. Was scheduled to start Promacta at [facility] but has not started yet. Upper GI bleed: EGD planned today. Hemoglobin 7.6. Transfuse platelets to keep above 50,000. Will continue to follow. Thank you for the consult. FATOUMATA Moyer - CNP  Electronically signed 1/23/2023 at 9:09 AM  Patient seen and examined. Agree with CNP's assessment plan. Note updated.     Sonya Sinclair MD

## 2023-01-23 NOTE — ANESTHESIA POSTPROCEDURE EVALUATION
Department of Anesthesiology  Postprocedure Note    Patient: Kulwant Suazo  MRN: [id number]  [place]: 1960  Date of evaluation: 1/23/2023      Procedure Summary     Date: 01/23/23 Room / Location: [facility] / [facility]    Anesthesia Start: 0860 Anesthesia Stop: 7682    Procedure: EGD BAND LIGATION Diagnosis:       Gastrointestinal hemorrhage, unspecified gastrointestinal hemorrhage type      (GIB)    Surgeons: Dennie Salen, MD Responsible Provider: Linda Jean MD    Anesthesia Type: MAC ASA Status: 4          Anesthesia Type: MAC    Raquel Phase I:      Raquel Phase II:        Anesthesia Post Evaluation    Patient location during evaluation: bedside  Patient participation: complete - patient participated  Level of consciousness: awake and alert  Pain score: 0  Airway patency: patent  Nausea & Vomiting: no nausea and no vomiting  Complications: no  Cardiovascular status: hemodynamically stable  Respiratory status: acceptable, spontaneous ventilation and room air  Hydration status: stable  Comments: Pt denies questions or needs at this time.

## 2023-01-23 NOTE — PATIENT CARE CONFERENCE
Intensive Care Daily Quality Rounding Checklist      ICU Team Members:     ICU Day #: NUMBER: 2    Intubation Date:  N/A      Ventilator Day #: NUMBER: N/A    Central Line Insertion Date:  N/A          Day #: NUMBER: N/A     Arterial Line Insertion Date:  N/A        Day #: NUMBER: N/A    Temporary Hemodialysis Catheter Insertion Date:  N/A        Day # NUMBER: N/A    DVT Prophylaxis: Plavix at home, on hold    GI Prophylaxis: Protonix gtt    Pope Catheter Insertion Date:  N/A         Day #: N/A      Continued need (if yes, reason documented and discussed with physician): no    Skin Issues/ Wounds and ordered treatment discussed on rounds: Small scab on left shin, MRSA    Goals/ Plans for the Day: Scope p/GI today, monitor labs and hemodynamic stability, monitor for bleeding, transfuse as needed, dc prednisone and start solumedrol daily.

## 2023-01-24 LAB
ALBUMIN SERPL-MCNC: 3.3 G/DL (ref 3.5–5.2)
ALP BLD-CCNC: 40 U/L (ref 35–104)
ALT SERPL-CCNC: 19 U/L (ref 0–32)
ANION GAP SERPL CALCULATED.3IONS-SCNC: 9 MMOL/L (ref 7–16)
ANTI-MITOCHONDRIAL AB, IFA: NEGATIVE
ANTI-NUCLEAR ANTIBODY (ANA): NEGATIVE
AST SERPL-CCNC: 17 U/L (ref 0–31)
BASOPHILS ABSOLUTE: 0 E9/L (ref 0–0.2)
BASOPHILS RELATIVE PERCENT: 0 % (ref 0–2)
BILIRUB SERPL-MCNC: 0.5 MG/DL (ref 0–1.2)
BUN BLDV-MCNC: 36 MG/DL (ref 6–23)
CALCIUM SERPL-MCNC: 8.2 MG/DL (ref 8.6–10.2)
CHLORIDE BLD-SCNC: 109 MMOL/L (ref 98–107)
CO2: 20 MMOL/L (ref 22–29)
CREAT SERPL-MCNC: 0.9 MG/DL (ref 0.5–1)
EOSINOPHILS ABSOLUTE: 0 E9/L (ref 0.05–0.5)
EOSINOPHILS RELATIVE PERCENT: 0 % (ref 0–6)
GFR SERPL CREATININE-BSD FRML MDRD: >60 ML/MIN/1.73
GLUCOSE BLD-MCNC: 287 MG/DL (ref 74–99)
HAV IGM SER IA-ACNC: NORMAL
HCT VFR BLD CALC: 21 % (ref 34–48)
HCT VFR BLD CALC: 23.2 % (ref 34–48)
HCT VFR BLD CALC: 23.2 % (ref 34–48)
HCT VFR BLD CALC: 24 % (ref 34–48)
HEMOGLOBIN: 7 G/DL (ref 11.5–15.5)
HEMOGLOBIN: 7.4 G/DL (ref 11.5–15.5)
HEMOGLOBIN: 7.6 G/DL (ref 11.5–15.5)
HEMOGLOBIN: 8 G/DL (ref 11.5–15.5)
HEPATITIS B CORE IGM ANTIBODY: NORMAL
HEPATITIS B SURFACE ANTIGEN INTERPRETATION: NORMAL
HEPATITIS C ANTIBODY INTERPRETATION: NORMAL
HYPOCHROMIA: ABNORMAL
IGG: 568 MG/DL (ref 700–1600)
IGM: 30 MG/DL (ref 40–230)
IMMATURE GRANULOCYTES #: 0.01 E9/L
IMMATURE GRANULOCYTES %: 0.6 % (ref 0–5)
INR BLD: 1.5
LYMPHOCYTES ABSOLUTE: 0.23 E9/L (ref 1.5–4)
LYMPHOCYTES RELATIVE PERCENT: 14.2 % (ref 20–42)
MAGNESIUM: 1.9 MG/DL (ref 1.6–2.6)
MAGNESIUM: 2.2 MG/DL (ref 1.6–2.6)
MCH RBC QN AUTO: 30.4 PG (ref 26–35)
MCH RBC QN AUTO: 30.9 PG (ref 26–35)
MCHC RBC AUTO-ENTMCNC: 33.3 % (ref 32–34.5)
MCHC RBC AUTO-ENTMCNC: 33.3 % (ref 32–34.5)
MCV RBC AUTO: 91.3 FL (ref 80–99.9)
MCV RBC AUTO: 92.7 FL (ref 80–99.9)
METER GLUCOSE: 263 MG/DL (ref 74–99)
METER GLUCOSE: 323 MG/DL (ref 74–99)
METER GLUCOSE: 342 MG/DL (ref 74–99)
METER GLUCOSE: 351 MG/DL (ref 74–99)
MONOCYTES ABSOLUTE: 0.02 E9/L (ref 0.1–0.95)
MONOCYTES RELATIVE PERCENT: 1.2 % (ref 2–12)
MRSA CULTURE ONLY: NORMAL
NEUTROPHILS ABSOLUTE: 1.36 E9/L (ref 1.8–7.3)
NEUTROPHILS RELATIVE PERCENT: 84 % (ref 43–80)
OVALOCYTES: ABNORMAL
PDW BLD-RTO: 14.7 FL (ref 11.5–15)
PDW BLD-RTO: 14.8 FL (ref 11.5–15)
PHOSPHORUS: 4.5 MG/DL (ref 2.5–4.5)
PLATELET # BLD: 29 E9/L (ref 130–450)
PLATELET # BLD: 32 E9/L (ref 130–450)
PLATELET CONFIRMATION: NORMAL
PLATELET CONFIRMATION: NORMAL
PMV BLD AUTO: 11.5 FL (ref 7–12)
PMV BLD AUTO: 12 FL (ref 7–12)
POIKILOCYTES: ABNORMAL
POTASSIUM SERPL-SCNC: 4.4 MMOL/L (ref 3.5–5)
PROTHROMBIN TIME: 17 SEC (ref 9.3–12.4)
RBC # BLD: 2.3 E12/L (ref 3.5–5.5)
RBC # BLD: 2.59 E12/L (ref 3.5–5.5)
SMOOTH MUSCLE ANTIBODY: NEGATIVE
SODIUM BLD-SCNC: 138 MMOL/L (ref 132–146)
TOTAL PROTEIN: 5.1 G/DL (ref 6.4–8.3)
WBC # BLD: 1.6 E9/L (ref 4.5–11.5)
WBC # BLD: 2.2 E9/L (ref 4.5–11.5)

## 2023-01-24 PROCEDURE — 6370000000 HC RX 637 (ALT 250 FOR IP)

## 2023-01-24 PROCEDURE — 2580000003 HC RX 258: Performed by: NURSE PRACTITIONER

## 2023-01-24 PROCEDURE — 2580000003 HC RX 258: Performed by: STUDENT IN AN ORGANIZED HEALTH CARE EDUCATION/TRAINING PROGRAM

## 2023-01-24 PROCEDURE — 85027 COMPLETE CBC AUTOMATED: CPT

## 2023-01-24 PROCEDURE — 85018 HEMOGLOBIN: CPT

## 2023-01-24 PROCEDURE — 36415 COLL VENOUS BLD VENIPUNCTURE: CPT

## 2023-01-24 PROCEDURE — 2580000003 HC RX 258: Performed by: INTERNAL MEDICINE

## 2023-01-24 PROCEDURE — A4216 STERILE WATER/SALINE, 10 ML: HCPCS | Performed by: INTERNAL MEDICINE

## 2023-01-24 PROCEDURE — 6360000002 HC RX W HCPCS: Performed by: NURSE PRACTITIONER

## 2023-01-24 PROCEDURE — C9113 INJ PANTOPRAZOLE SODIUM, VIA: HCPCS | Performed by: NURSE PRACTITIONER

## 2023-01-24 PROCEDURE — 85610 PROTHROMBIN TIME: CPT

## 2023-01-24 PROCEDURE — 2060000000 HC ICU INTERMEDIATE R&B

## 2023-01-24 PROCEDURE — 6370000000 HC RX 637 (ALT 250 FOR IP): Performed by: STUDENT IN AN ORGANIZED HEALTH CARE EDUCATION/TRAINING PROGRAM

## 2023-01-24 PROCEDURE — 6360000002 HC RX W HCPCS: Performed by: INTERNAL MEDICINE

## 2023-01-24 PROCEDURE — 6370000000 HC RX 637 (ALT 250 FOR IP): Performed by: INTERNAL MEDICINE

## 2023-01-24 PROCEDURE — 80053 COMPREHEN METABOLIC PANEL: CPT

## 2023-01-24 PROCEDURE — 85025 COMPLETE CBC W/AUTO DIFF WBC: CPT

## 2023-01-24 PROCEDURE — 84100 ASSAY OF PHOSPHORUS: CPT

## 2023-01-24 PROCEDURE — C9113 INJ PANTOPRAZOLE SODIUM, VIA: HCPCS | Performed by: INTERNAL MEDICINE

## 2023-01-24 PROCEDURE — 99232 SBSQ HOSP IP/OBS MODERATE 35: CPT | Performed by: STUDENT IN AN ORGANIZED HEALTH CARE EDUCATION/TRAINING PROGRAM

## 2023-01-24 PROCEDURE — 85014 HEMATOCRIT: CPT

## 2023-01-24 PROCEDURE — 82962 GLUCOSE BLOOD TEST: CPT

## 2023-01-24 PROCEDURE — 2580000003 HC RX 258

## 2023-01-24 PROCEDURE — 6360000002 HC RX W HCPCS

## 2023-01-24 PROCEDURE — 83735 ASSAY OF MAGNESIUM: CPT

## 2023-01-24 RX ORDER — INSULIN LISPRO 100 [IU]/ML
0-4 INJECTION, SOLUTION INTRAVENOUS; SUBCUTANEOUS NIGHTLY
Status: DISCONTINUED | OUTPATIENT
Start: 2023-01-24 | End: 2023-01-27 | Stop reason: HOSPADM

## 2023-01-24 RX ORDER — MAGNESIUM SULFATE IN WATER 40 MG/ML
2000 INJECTION, SOLUTION INTRAVENOUS ONCE
Status: COMPLETED | OUTPATIENT
Start: 2023-01-24 | End: 2023-01-24

## 2023-01-24 RX ORDER — INSULIN LISPRO 100 [IU]/ML
0-16 INJECTION, SOLUTION INTRAVENOUS; SUBCUTANEOUS
Status: DISCONTINUED | OUTPATIENT
Start: 2023-01-24 | End: 2023-01-27 | Stop reason: HOSPADM

## 2023-01-24 RX ADMIN — LEVOTHYROXINE SODIUM 88 MCG: 0.09 TABLET ORAL at 05:01

## 2023-01-24 RX ADMIN — DEXAMETHASONE 40 MG: 4 TABLET ORAL at 09:38

## 2023-01-24 RX ADMIN — SODIUM CHLORIDE, PRESERVATIVE FREE 10 ML: 5 INJECTION INTRAVENOUS at 21:08

## 2023-01-24 RX ADMIN — SODIUM CHLORIDE, PRESERVATIVE FREE 40 MG: 5 INJECTION INTRAVENOUS at 21:08

## 2023-01-24 RX ADMIN — MAGNESIUM SULFATE HEPTAHYDRATE 2000 MG: 40 INJECTION, SOLUTION INTRAVENOUS at 06:41

## 2023-01-24 RX ADMIN — WATER 1000 MG: 1 INJECTION INTRAMUSCULAR; INTRAVENOUS; SUBCUTANEOUS at 21:08

## 2023-01-24 RX ADMIN — SODIUM CHLORIDE, PRESERVATIVE FREE 40 MG: 5 INJECTION INTRAVENOUS at 08:00

## 2023-01-24 RX ADMIN — INSULIN LISPRO 12 UNITS: 100 INJECTION, SOLUTION INTRAVENOUS; SUBCUTANEOUS at 16:44

## 2023-01-24 RX ADMIN — INSULIN LISPRO 12 UNITS: 100 INJECTION, SOLUTION INTRAVENOUS; SUBCUTANEOUS at 11:57

## 2023-01-24 RX ADMIN — INSULIN LISPRO 4 UNITS: 100 INJECTION, SOLUTION INTRAVENOUS; SUBCUTANEOUS at 21:14

## 2023-01-24 RX ADMIN — ATORVASTATIN CALCIUM 40 MG: 40 TABLET, FILM COATED ORAL at 21:07

## 2023-01-24 RX ADMIN — SODIUM CHLORIDE, PRESERVATIVE FREE 10 ML: 5 INJECTION INTRAVENOUS at 08:04

## 2023-01-24 RX ADMIN — INSULIN LISPRO 2 UNITS: 100 INJECTION, SOLUTION INTRAVENOUS; SUBCUTANEOUS at 04:33

## 2023-01-24 RX ADMIN — SODIUM CHLORIDE: 9 INJECTION, SOLUTION INTRAVENOUS at 02:04

## 2023-01-24 RX ADMIN — OCTREOTIDE ACETATE 50 MCG/HR: 500 INJECTION, SOLUTION INTRAVENOUS; SUBCUTANEOUS at 06:44

## 2023-01-24 ASSESSMENT — PAIN SCALES - GENERAL
PAINLEVEL_OUTOF10: 0

## 2023-01-24 NOTE — PATIENT CARE CONFERENCE
Intensive Care Daily Quality Rounding Checklist      ICU Team Members: Bedside Nurse, charge nurse, Candace Pizano, residents    ICU Day #: NUMBER: 3    Intubation Date:  n/a    Ventilator Day #: n/a    Central Line Insertion Date:  n/a        Day #: na        Indication: n/a     Arterial Line Insertion Date: n/a       Day #: n/a    Temporary Hemodialysis Catheter Insertion Date:  n/a      Day # n/a    DVT Prophylaxis: contraindictaed (gi bleed)    GI Prophylaxis: protonix I.v.    Pope Catheter Insertion Date:  n/a       Day #: n/a      Continued need (if yes, reason documented and discussed with physician): n/a    Skin Issues/ Wounds and ordered treatment discussed on rounds: scab lle/sos protocol in place    Goals/ Plans for the Day: labs, replace electrolytes, clear liquid diet

## 2023-01-24 NOTE — PROGRESS NOTES
[facility]  Hematology/Oncology  Consult      Patient Name: Beatriz Sosa  YOB: 1960  PCP: Lana Nation MD   Referring Provider:      Reason for Consultation:   Chief Complaint   Patient presents with    Emesis     Throwing up bright red blood this AM    Melena    Abdominal Pain      Interval history: Moved to a regular floor from ICU today. No more black tarry stools. Hemoglobin stable at 7. S/p EGD yesterday. 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 She had marrow evaluation in 2015 and 2017 and peripheral blood for myeloid NGS in 2018. Since that time worsening cytopenias. She then had a another marrow in November 2022 which showed normocellular marrow without acute abnormalities, increased megakaryocytes noted. PET scan was largely unrevealing although did show cirrhotic liver morphology with subsequent significant splenomegaly at 24 cm. Given relatively bland marrow she was started on treatment for presumed ITP with Doptelet 20mg daily. 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 Patient presented to the ED for evaluation of hematemesis and black stool ongoing for 24 hours. CT abd revealed cirrhosis and portal hypertension with splenomegaly and esophageal varices. CBC revealed hgb 6.6 patient s/p 1 unit of pRBC's. 2 more ordered. Positive UA on Rocephin. GI and General surgery following patient for EGD with possible banding today. ICU for further care. AFP, ROSETTA, CEA, iron profile, IG's, hepatitis panel pending. CMP with BUN 36, Mg 1.3. LFT's unremarkable. CBC with WBC 2.3, hgb 7.8 s/p 1 unit of pRBC's yesterday, platelets 24. Consultation for pancytopenia presumed ITP. Review of systems: Over 10 systems were reviewed and all were negative except as mentioned above. Diagnostic Data:     Past Medical History:   Diagnosis Date    Cerebral artery occlusion with cerebral infarction ([facility]     Diabetes mellitus ([facility]        Patient Active Problem List    Diagnosis Date Noted    Thrombocythemia 01/23/2023    Chronic ITP (idiopathic thrombocytopenic purpura) ([facility] 01/23/2023    Acute blood loss anemia 01/23/2023    Other cirrhosis of liver ([facility] 01/23/2023    History of ITP 01/23/2023    Pancytopenia ([facility] 01/23/2023    Upper GI bleed 01/22/2023    Esophageal bleed, non-variceal 01/22/2023        Past Surgical History:   Procedure Laterality Date    CHOLECYSTECTOMY      UPPER GASTROINTESTINAL ENDOSCOPY  1/23/2023    EGD BAND LIGATION performed by Isabelle Salguero MD at [facility] ENDOSCOPY       Family History  History reviewed. No pertinent family history. Social History    TOBACCO:   reports that she has never smoked. She has never used smokeless tobacco.  ETOH:   reports no history of alcohol use. Home Medications  Prior to Admission medications    Medication Sig Start Date End Date Taking?  Authorizing Provider   levothyroxine (SYNTHROID) 88 MCG tablet Take 1 tablet Monday through Saturday, 1.5 tablets on Sunday 12/12/22   FATOUMATA Díaz - CNS   Dulaglutide (TRULICITY) 1.5 QU/2.4BC SOPN Inject 1.5 mg into the skin once a week 7/6/22   FATOUMATA Díaz   atorvastatin (LIPITOR) 40 MG tablet Take 40 mg by mouth daily    Historical Provider, MD   clopidogrel (PLAVIX) 75 MG tablet  1/10/22   Historical Provider, MD   ferrous sulfate (IRON 325) 325 (65 Fe) MG tablet Take 325 mg by mouth daily (with breakfast) 3/7/22 6/5/22  Historical Provider, MD   irbesartan (AVAPRO) 150 MG tablet  2/20/22   Historical Provider, MD   metFORMIN (GLUCOPHAGE) 1000 MG tablet Take 1 tablet by mouth 2 times daily (with meals) 4/6/22   Fahad Chauhan FATOUMATA Jackson - CNS   TRESIBA FLEXTOUCH 100 UNIT/ML SOPN Inject 48 units subcutaneous  at night 4/6/22   FATOUMATA Feldman - CNS       Allergies  Allergies   Allergen Reactions    Shellfish-Derived Products Hives    Bacitracin Itching    Codeine      Other reaction(s): Other: See Comments, Unknown  Tingling arms, heart palpitations      Penicillins      Other reaction(s): Unknown  In childhood      Azithromycin Rash    Erythromycin Rash    Metoprolol Rash         Objective  BP (!) 112/49   Pulse 55   Temp 98 °F (36.7 °C) (Oral)   Resp 10   Ht 5' 10\" (1.778 m)   Wt 220 lb 7.4 oz (100 kg)   SpO2 99%   BMI 31.63 kg/m²     Physical Exam:   Performance Status:  General: AAO to person, place, time, in no acute distress,   Head and neck : PERRLA, EOMI . Sclera non icteric. Oropharynx : Clear  Neck: no JVD,  no adenopathy  Heart: Regular rate and regular rhythm, no murmur  Lungs: Clear to auscultation   Extremities: No edema,no cyanosis, no clubbing. Abdomen: Soft, non-tender;no masses, no organomegaly  Skin:  No rash. Neurologic:Cranial nerves grossly intact. No focal motor or sensory deficits .     Recent Laboratory Data-   Lab Results   Component Value Date    WBC 2.2 (L) 01/24/2023    HGB 8.0 (L) 01/24/2023    HCT 24.0 (L) 01/24/2023    MCV 92.7 01/24/2023    PLT 32 (L) 01/24/2023    LYMPHOPCT 14.2 (L) 01/24/2023    RBC 2.59 (L) 01/24/2023    MCH 30.9 01/24/2023    MCHC 33.3 01/24/2023    RDW 14.8 01/24/2023    NEUTOPHILPCT 84.0 (H) 01/24/2023    MONOPCT 1.2 (L) 01/24/2023    BASOPCT 0.0 01/24/2023    NEUTROABS 1.36 (L) 01/24/2023    LYMPHSABS 0.23 (L) 01/24/2023    MONOSABS 0.02 (L) 01/24/2023    EOSABS 0.00 (L) 01/24/2023    BASOSABS 0.00 01/24/2023       Lab Results   Component Value Date     01/24/2023    K 4.4 01/24/2023     (H) 01/24/2023    CO2 20 (L) 01/24/2023    BUN 36 (H) 01/24/2023    CREATININE 0.9 01/24/2023    GLUCOSE 287 (H) 01/24/2023    CALCIUM 8.2 (L) 01/24/2023    PROT 5.1 (L) 01/24/2023    LABALBU 3.3 (L) 01/24/2023    BILITOT 0.5 01/24/2023    ALKPHOS 40 01/24/2023    AST 17 01/24/2023    ALT 19 01/24/2023    LABGLOM >60 01/24/2023    GFRAA >60 01/18/2022       Lab Results   Component Value Date    IRON 100 01/23/2023    TIBC 272 01/23/2023    FERRITIN 110 01/23/2023           Radiology-    CT ABDOMEN PELVIS W IV CONTRAST Additional Contrast? None    Result Date: 1/22/2023  EXAMINATION: CT OF THE ABDOMEN AND PELVIS WITH CONTRAST 1/22/2023 3:34 pm TECHNIQUE: CT of the abdomen and pelvis was performed with the administration of intravenous contrast. Multiplanar reformatted images are provided for review. Automated exposure control, iterative reconstruction, and/or weight based adjustment of the mA/kV was utilized to reduce the radiation dose to as low as reasonably achievable. COMPARISON: None. HISTORY: ORDERING SYSTEM PROVIDED HISTORY: abd pain, rectal bleeding TECHNOLOGIST PROVIDED HISTORY: Reason for exam:->abd pain, rectal bleeding Additional Contrast?->None Decision Support Exception - unselect if not a suspected or confirmed emergency medical condition->Emergency Medical Condition (MA) FINDINGS: Lower Chest:  Visualized portion of the lower chest demonstrates no acute abnormality. Distal esophageal varices are noted. Organs: The liver demonstrates a cirrhotic morphology with hypertrophy of the caudate lobe and left lateral segment as well as a nodular surface. A recanalized paraumbilical vein is seen. The portal vein is patent. Status post cholecystectomy. No biliary ductal dilatation seen. Spleen is significantly enlarged measuring 21.0 cm. Prominent perisplenic collateral vessels are noted. The pancreas and adrenal glands are unremarkable. The kidneys enhance symmetrically without evidence of hydronephrosis. GI/Bowel: Stomach and duodenal sweep demonstrate no acute abnormality. There is no evidence of bowel obstruction.   No evidence of abnormal bowel wall thickening or distension. Mild edematous wall thickening of the ascending colon is noted, likely related to portal hypertension. The appendix is visualized and is unremarkable. No evidence of acute appendicitis. There is diverticulosis without evidence of diverticulitis. Pelvis:  Bladder is unremarkable in appearance. The uterus and adnexa are without acute abnormality. Peritoneum/Retroperitoneum: A small volume of ascites is seen, predominantly in the perihepatic and perisplenic locations. No free air is noted. No evidence of lymphadenopathy. Aorta is normal in caliber. Bones/Soft Tissues:  No acute abnormality of the visualized osseous structures. Cirrhosis with portal hypertension manifesting as splenomegaly, ascending colon wall thickening, small volume of ascites and collateral vessels including esophageal varices. Diverticulosis without evidence of acute diverticulitis. XR CHEST PORTABLE    Result Date: 1/23/2023  EXAMINATION: ONE XRAY VIEW OF THE CHEST 1/23/2023 5:52 am COMPARISON: None. HISTORY: ORDERING SYSTEM PROVIDED HISTORY: SOB TECHNOLOGIST PROVIDED HISTORY: Reason for exam:->SOB FINDINGS: The lungs are without acute focal process. There is no effusion or pneumothorax. The cardiomediastinal silhouette is without acute process. The osseous structures are without acute process. No acute process. 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 Workup as above. Started on treatment for presumed ITP with Doptelet 20mg daily. 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 - pancytopenia presumed ITP    - Hematemesis and black stool ongoing for 24 hours. - CT abd revealed cirrhosis and portal hypertension with splenomegaly and esophageal varices.    - CBC revealed hgb 6.6 patient s/p 1 unit of pRBC's. 2 more ordered. - Positive UA on Rocephin.   - GI and General surgery following patient for EGD with possible banding today. - ICU for further care. - AFP, ROSETTA, CEA, iron profile, IG's, hepatitis panel pending. CMP with BUN 36, Mg 1.3. LFT's unremarkable. - CBC with WBC 2.3, hgb 7.8 s/p 1 unit of pRBC's yesterday, platelets 24. Attending addendum: Thrombocytopenia most likely seems to be due to underlying cirrhosis and hypersplenism with superimposed consumption due to variceal bleed. Patient however has diagnosis of ITP made in the past from [facility]. S/p 2 units of platelet transfusion without any increment in platelet count. Will do dexamethasone 40 mg daily for 4 doses for ITP. Was scheduled to start Promacta at [facility] but has not started yet. Upper GI bleed: EGD planned today. Hemoglobin 7.6. Transfuse platelets to keep above 50,000. Will continue to follow. 1/24/23  - Thrombocytopenia as above. - S/p 2 units of platelet transfusion without any increment in platelet count. - Continue dexamethasone 40 mg daily for 4 doses for ITP. Was scheduled to start Promacta at [facility] but has not started yet. - Upper GI bleed: EGD with Grade 3/4 esophageal varices with stigmata of recent bleed. Three bands were placed with hemostasis. Stomach showed markedly severe portal hypertensive gastropathy, changes with severe congestion. Duodenum unremarkable. No biopsies were done in view of the patient's severe thrombocytopenia.   - Hemoglobin 8.0  Transfuse platelets to keep above 50,000. Platelets 32. Will continue to follow. Thank you for the consult. FATOUMATA Roque - CNP  Electronically signed 1/24/2023 at 1:22 PM  Patient seen and examined. Agree with CNP's assessment plan. Note updated.     Danielle Rushing MD

## 2023-01-24 NOTE — OP NOTE
[address] [address]                                OPERATIVE REPORT    PATIENT NAME: Joanne Watkins                    :        1960  MED REC NO:   [id number]                            ROOM:       0205  ACCOUNT NO:   [de-identified]                           ADMIT DATE: 2023  PROVIDER:     Fabian Alberts MD    DATE OF PROCEDURE:  2023    PROCEDURES PERFORMED:  Upper endoscopy with esophageal variceal banding. PREOPERATIVE DIAGNOSES:  Evaluation for upper GI bleed, anemia, and  history of liver cirrhosis, being managed in [facility]. This is  my first encounter with the patient. POSTOPERATIVE DIAGNOSES:  Grade 3/4 esophageal varices with stigmata of  recent bleed. Three bands were placed with hemostasis. Stomach showed  markedly severe portal hypertensive gastropathy, changes with severe  congestion. Duodenum unremarkable. No biopsies were done in view of  the patient's severe thrombocytopenia. SURGEON:  Fabian Alberts M.D. ANESTHESIA  Local monitored anesthesia care. NOTE:  Prior to the procedure an informed consent was obtained from the  patient after explaining the benefits as well as the risks,  alternatives, and complications of the procedure to the patient, who  understood and agreed. DESCRIPTION OF PROCEDURE:  With the patient in the left lateral  decubitus position, the Olympus GIF-100 forward-viewing videoscope was  introduced into the esophagus, the evaluation of which showed three  varices grade 3/4 in diameter with stigmata of recent hemorrhage and no  hiatal hernia was seen. The scope was then advanced through the gastroesophageal junction into  the gastric body, along the greater curvature. Evaluation of the body  of the stomach showed severe portal hypertensive gastropathy changes  with marked congestion.   Biopsies were taken from this area to rule out  Helicobacter pylori infection. The scope was then advanced through the pylorus into the duodenal bulb  and second portion of the duodenum, both of which appeared to be  unremarkable. The scope was then retrieved and retroflexed in the  prepyloric antrum, with thorough evaluation of the cardiac and fundal  portions of the stomach, which appeared to be within normal limits. The scope was then straightened, the area deflated, and the procedure  was terminated by withdrawing the scope and conducting a second look on  the way out, which was essentially the same. The banding device was attached to the tip of the upper endoscope, which  was reintroduced into the esophagus, where three bands were placed in  the aforementioned varices with excellent hemostasis. The scope was  then retrieved, area decompressed, and procedure was terminated. The  patient tolerated the procedure well.         Funmilayo Hook MD    D: 01/23/2023 15:41:41       T: 01/23/2023 15:44:11     [email or web address]  Job#: [id number]     Doc#: [id number]    CC:  MD Nakita Franks MD

## 2023-01-24 NOTE — PROGRESS NOTES
PROGRESS NOTE        Patient Presents with/Seen in Consultation For      Reason for Consult: active GI bleed hemoglobin 6.6  CHIEF COMPLAINT:  hematemesis   Subjective:     Patient seen Justine Otley in bed in ICU in no apparent distress.  at bedside. EGD findings reviewed extensively with patient and , all additional questions and concerns addressed. No gross bleeding reported. Tolerating clear liquid diet. No complaints of epigastric pain or nausea. Plan of care discussed with patient and . Review of Systems  Aside from what was mentioned in the PMH and HPI, essentially unremarkable, all others negative. Objective:     BP (!) 111/52   Pulse 51   Temp 98 °F (36.7 °C) (Oral)   Resp 19   Ht 5' 10\" (1.778 m)   Wt 220 lb 7.4 oz (100 kg)   SpO2 98%   BMI 31.63 kg/m²     General appearance: alert, awake, laying in bed, and cooperative  Eyes: conjunctiva pale, sclera anicteric. PERRL.   Lungs: clear to auscultation bilaterally  Heart: regular rate and rhythm, no murmur, 2+ pulses; no edema  Abdomen: soft, non-tender; bowel sounds normal; no masses,  no organomegaly  Extremities: extremities without edema  Pulses: 2+ and symmetric  Skin: Skin color, texture, turgor normal.   Neurologic: Grossly normal    insulin lispro (HUMALOG) injection vial 0-16 Units, TID WC  insulin lispro (HUMALOG) injection vial 0-4 Units, Nightly  atorvastatin (LIPITOR) tablet 40 mg, Daily  levothyroxine (SYNTHROID) tablet 88 mcg, Daily  0.9 % sodium chloride infusion, PRN  pantoprazole (PROTONIX) 40 mg in sodium chloride (PF) 0.9 % 10 mL injection, Q12H  dexamethasone (DECADRON) tablet 40 mg, Daily  nadolol (CORGARD) tablet 20 mg, Daily  0.9 % sodium chloride infusion, PRN  sodium chloride flush 0.9 % injection 5-40 mL, 2 times per day  sodium chloride flush 0.9 % injection 5-40 mL, PRN  0.9 % sodium chloride infusion, PRN  ondansetron (ZOFRAN-ODT) disintegrating tablet 4 mg, Q8H PRN   Or  ondansetron (ZOFRAN) injection 4 mg, Q6H PRN  polyethylene glycol (GLYCOLAX) packet 17 g, Daily PRN  acetaminophen (TYLENOL) tablet 650 mg, Q6H PRN   Or  acetaminophen (TYLENOL) suppository 650 mg, Q6H PRN  albuterol (PROVENTIL) nebulizer solution 2.5 mg, Q2H PRN  cefTRIAXone (ROCEPHIN) 1,000 mg in sterile water 10 mL IV syringe, Q24H  octreotide (SANDOSTATIN) 500 mcg in sodium chloride 0.9 % 100 mL infusion, Continuous  glucose chewable tablet 16 g, PRN  dextrose bolus 10% 125 mL, PRN   Or  dextrose bolus 10% 250 mL, PRN  glucagon (rDNA) injection 1 mg, PRN  dextrose 10 % infusion, Continuous PRN       Data Review  CBC:   Lab Results   Component Value Date/Time    WBC 2.2 01/24/2023 09:40 AM    RBC 2.59 01/24/2023 09:40 AM    HGB 8.0 01/24/2023 09:40 AM    HCT 24.0 01/24/2023 09:40 AM    MCV 92.7 01/24/2023 09:40 AM    MCH 30.9 01/24/2023 09:40 AM    MCHC 33.3 01/24/2023 09:40 AM    RDW 14.8 01/24/2023 09:40 AM    PLT 32 01/24/2023 09:40 AM    MPV 12.0 01/24/2023 09:40 AM     CMP:    Lab Results   Component Value Date/Time     01/24/2023 04:30 AM    K 4.4 01/24/2023 04:30 AM     01/24/2023 04:30 AM    CO2 20 01/24/2023 04:30 AM    BUN 36 01/24/2023 04:30 AM    CREATININE 0.9 01/24/2023 04:30 AM    GFRAA >60 01/18/2022 03:15 PM    LABGLOM >60 01/24/2023 04:30 AM    GLUCOSE 287 01/24/2023 04:30 AM    PROT 5.1 01/24/2023 04:30 AM    LABALBU 3.3 01/24/2023 04:30 AM    CALCIUM 8.2 01/24/2023 04:30 AM    BILITOT 0.5 01/24/2023 04:30 AM    ALKPHOS 40 01/24/2023 04:30 AM    AST 17 01/24/2023 04:30 AM    ALT 19 01/24/2023 04:30 AM     Hepatic Function Panel:    Lab Results   Component Value Date/Time    ALKPHOS 40 01/24/2023 04:30 AM    ALT 19 01/24/2023 04:30 AM    AST 17 01/24/2023 04:30 AM    PROT 5.1 01/24/2023 04:30 AM    BILITOT 0.5 01/24/2023 04:30 AM    BILIDIR 0.2 01/22/2023 04:28 PM    IBILI 0.6 01/22/2023 04:28 PM    LABALBU 3.3 01/24/2023 04:30 AM     No components found for: CHLPL    Lab Results   Component Value Date TRIG 100 12/01/2022       Lab Results   Component Value Date    HDL 32 01/23/2023    HDL 42 12/01/2022       Lab Results   Component Value Date    LDLCALC 47 01/23/2023    LDLCALC 40 12/01/2022       Lab Results   Component Value Date    LABVLDL 21 01/23/2023      PT/INR:    Lab Results   Component Value Date/Time    PROTIME 17.0 01/24/2023 04:30 AM    INR 1.5 01/24/2023 04:30 AM     IRON:    Lab Results   Component Value Date/Time    IRON 100 01/23/2023 11:30 AM     Iron Saturation:  No components found for: PERCENTFE  FERRITIN:    Lab Results   Component Value Date/Time    FERRITIN 110 01/23/2023 11:30 AM         Assessment:     Active Problems:  Hematemesis  Melena  Anemia, acute GI bleed  Cirrhosis  Pancytopenia  History of a stroke on Plavix  Fatigue  Weakness  History of ITP, follows [facility]  EGD 1/23/23- Grade 3/4 esophageal varices with stigmata of recent bleed. Three bands were placed with hemostasis. Stomach showed markedly severe portal hypertensive gastropathy, changes with severe congestion. Duodenum unremarkable. No biopsies were done in view of the patient's severe thrombocytopenia. Plan:   Critical care management per ICU team  Discontinue octreotide drip  Protonix 40 mg IV every 12  Nadolol 20 mg daily   Clear diet today   Medicate for nausea as needed   Monitor CBC, CMP and INR daily  Liver serology pending   Supportive care  Continue to monitor       Note: This report was completed utilizing computer voice recognition software. Every effort has been made to ensure accuracy, however; inadvertent computerized transcription errors may be present.      Discussed with Dr. Senait Barrera per Dr. Ira HAM-NP-SÁNCHEZ 1/24/2023  11:33 AM For Dr. Magali Silverio

## 2023-01-24 NOTE — CARE COORDINATION
Updated plan of care. EGD with 3 variceal banding. Follows at [facility]. Plan remains home with spouse. Switched to iv protonix.  Will follow-o

## 2023-01-24 NOTE — PROGRESS NOTES
[facility] Progress Note    Admitting Date and Time: 1/22/2023  3:43 PM  Admit Dx: Upper GI bleed [K92.2]  Esophageal bleed, non-variceal [K22.89]    Subjective:  Ms. Ki Bello is a 58-year-old female with past medical history significant for stroke, type II DM, hypertension, cirrhosis, portal hypertension, esophageal varices, ITP, hypothyroidism who presents with acute blood loss anemia found to have esophageal varices on EGD 1/23 with stigmata of recent bleeding s/p banding. No acute events overnight. Underwent EGD yesterday by GI that revealed esophageal varices with stigmata of recent bleeding s/p banding. She denies complaints this morning including chest pain, shortness of breath, nausea, vomiting, headache. ROS: denies fever, chills, cp, sob, n/v, HA unless stated above.       insulin lispro  0-16 Units SubCUTAneous TID WC    insulin lispro  0-4 Units SubCUTAneous Nightly    atorvastatin  40 mg Oral Daily    levothyroxine  88 mcg Oral Daily    pantoprazole (PROTONIX) 40 mg injection  40 mg IntraVENous Q12H    dexamethasone  40 mg Oral Daily    nadolol  20 mg Oral Daily    sodium chloride flush  5-40 mL IntraVENous 2 times per day    cefTRIAXone (ROCEPHIN) IV  1,000 mg IntraVENous Q24H     sodium chloride, , PRN  sodium chloride, , PRN  sodium chloride flush, 5-40 mL, PRN  sodium chloride, , PRN  ondansetron, 4 mg, Q8H PRN   Or  ondansetron, 4 mg, Q6H PRN  polyethylene glycol, 17 g, Daily PRN  acetaminophen, 650 mg, Q6H PRN   Or  acetaminophen, 650 mg, Q6H PRN  potassium chloride, 20 mEq, PRN   Or  potassium chloride, 10 mEq, PRN  albuterol, 2.5 mg, Q2H PRN  glucose, 4 tablet, PRN  dextrose bolus, 125 mL, PRN   Or  dextrose bolus, 250 mL, PRN  glucagon (rDNA), 1 mg, PRN  dextrose, , Continuous PRN       Objective:    BP (!) 111/52   Pulse 51   Temp 98 °F (36.7 °C) (Oral)   Resp 19   Ht 5' 10\" (1.778 m)   Wt 220 lb 7.4 oz (100 kg)   SpO2 98%   BMI 31.63 kg/m²     General Appearance: alert and oriented to person, place and time and in no acute distress  Skin: warm and dry  Head: normocephalic and atraumatic  Eyes: pupils equal, round, and reactive to light, extraocular eye movements intact, conjunctivae normal  Neck: neck supple and non tender without mass   Pulmonary/Chest: clear to auscultation bilaterally- no wheezes, rales or rhonchi, normal air movement, no respiratory distress  Cardiovascular: normal rate, normal S1 and S2 and no carotid bruits  Abdomen: soft, non-tender, non-distended, normal bowel sounds, no masses or organomegaly  Extremities: no cyanosis, no clubbing and no edema  Neurologic: no cranial nerve deficit and speech normal        Recent Labs     01/22/23  1628 01/23/23  0413 01/24/23  0430    139 138   K 4.4 4.4 4.4    108* 109*   CO2 22 23 20*   BUN 44* 36* 36*   CREATININE 0.7 0.9 0.9   GLUCOSE 277* 185* 287*   CALCIUM 9.4 9.0 8.2*         Recent Labs     01/23/23  0413 01/23/23  1130 01/23/23  1755 01/23/23  2120 01/24/23  0430   WBC 2.3* 1.8*  --   --  1.6*   RBC 2.59* 2.55*  --   --  2.30*   HGB 7.8* 7.6* 7.3* 7.7* 7.0*   HCT 23.5* 23.3* 21.7* 23.2* 21.0*   MCV 90.7 91.4  --   --  91.3   MCH 30.1 29.8  --   --  30.4   MCHC 33.2 32.6  --   --  33.3   RDW 14.6 14.7  --   --  14.7   PLT 24* 26*  --   --  29*   MPV 11.8 12.7*  --   --  11.5         Radiology: No new imaging to report. Assessment:    Principal Problem:    Upper GI bleed  Active Problems:    Esophageal bleed, non-variceal    Thrombocythemia    Chronic ITP (idiopathic thrombocytopenic purpura) (HCC)    Acute blood loss anemia    Other cirrhosis of liver (HCC)    History of ITP    Pancytopenia (HCC)  Resolved Problems:    * No resolved hospital problems. *      Plan:  1.   Upper GI bleed, with evidence of recent variceal bleed s/p banding 1/23-clear liquid diet, continue octreotide infusion, IV Protonix 40 mg every 12 hours, s/p EGD on 1/23 that revealed esophageal varices with stigmata of recent bleeding s/p banding. 2.  Acute blood loss anemia-hemoglobin 6.6 on presentation, s/p 2 units PRBCs. Continue to trend hemoglobin with H/H every 6 hours. Likely secondary to recent variceal bleed given EGD findings as discussed above. 3.  Acute complicated UTI plan continue IV ceftriaxone 1000 mg every 24 hours, follow urine culture. 4.  Pancytopenia-s/p 1 pack of platelets, hematology consulted. 5.  ITP-platelets 56D on 1/63, s/p 1 pack of platelets, appreciate hematology recommendations. Hematology plans to start IV dexamethasone 40 mg for 4 days. Goal platelet count above 50,000.  6.  DM type II on insulin lispro subcu, hypoglycemic precautions, Accu-Cheks  7. Cryptogenic cirrhosis with known esophageal varices-confirmed on EGD 1/23.  8.  History of stroke hold Plavix, continue statin  9. History of right parotid carcinoma s/p right subtotal parotidectomy and radiation therapy (2012, 2013)-follows with the [facility]  10. Hypothyroidism-continue home Synthroid  11. Hypertension-hold irbesartan  12. Obesity class I- follow-up with PCP for diet and lifestyle modifications. NOTE: This report was transcribed using voice recognition software. Every effort was made to ensure accuracy; however, inadvertent computerized transcription errors may be present.   Electronically signed by Smiley Gerard MD on 1/24/2023 at 9:41 AM

## 2023-01-24 NOTE — PLAN OF CARE
Problem: Discharge Planning  Goal: Discharge to home or other facility with appropriate resources  Outcome: Progressing  Flowsheets (Taken 1/23/2023 2000)  Discharge to home or other facility with appropriate resources: Arrange for needed discharge resources and transportation as appropriate     Problem: Pain  Goal: Verbalizes/displays adequate comfort level or baseline comfort level  Outcome: Progressing  Flowsheets  Taken 1/24/2023 0000  Verbalizes/displays adequate comfort level or baseline comfort level:   Encourage patient to monitor pain and request assistance   Assess pain using appropriate pain scale  Taken 1/23/2023 2000  Verbalizes/displays adequate comfort level or baseline comfort level:   Encourage patient to monitor pain and request assistance   Assess pain using appropriate pain scale     Problem: Safety - Adult  Goal: Free from fall injury  Outcome: Progressing  Flowsheets (Taken 1/24/2023 0019)  Free From Fall Injury: Instruct family/caregiver on patient safety     Problem: ABCDS Injury Assessment  Goal: Absence of physical injury  Outcome: Progressing  Flowsheets (Taken 1/24/2023 0019)  Absence of Physical Injury: Implement safety measures based on patient assessment     Problem: Chronic Conditions and Co-morbidities  Goal: Patient's chronic conditions and co-morbidity symptoms are monitored and maintained or improved  Outcome: Progressing  Flowsheets (Taken 1/23/2023 2000)  Care Plan - Patient's Chronic Conditions and Co-Morbidity Symptoms are Monitored and Maintained or Improved: Monitor and assess patient's chronic conditions and comorbid symptoms for stability, deterioration, or improvement     Problem: Skin/Tissue Integrity  Goal: Absence of new skin breakdown  Description: 1. Monitor for areas of redness and/or skin breakdown  2. Assess vascular access sites hourly  3. Every 4-6 hours minimum:  Change oxygen saturation probe site  4.   Every 4-6 hours:  If on nasal continuous positive airway pressure, respiratory therapy assess nares and determine need for appliance change or resting period.   Outcome: Progressing

## 2023-01-25 PROBLEM — E66.9 MODERATE OBESITY: Status: ACTIVE | Noted: 2023-01-25

## 2023-01-25 PROBLEM — E66.8 MODERATE OBESITY: Status: ACTIVE | Noted: 2023-01-25

## 2023-01-25 PROBLEM — I10 PRIMARY HYPERTENSION: Status: ACTIVE | Noted: 2023-01-25

## 2023-01-25 PROBLEM — I85.01 BLEEDING ESOPHAGEAL VARICES (HCC): Status: ACTIVE | Noted: 2023-01-25

## 2023-01-25 PROBLEM — I65.21 STENOSIS OF RIGHT CAROTID ARTERY: Status: ACTIVE | Noted: 2023-01-25

## 2023-01-25 PROBLEM — E11.9 TYPE 2 DIABETES MELLITUS WITHOUT COMPLICATION, WITHOUT LONG-TERM CURRENT USE OF INSULIN (HCC): Status: ACTIVE | Noted: 2023-01-25

## 2023-01-25 PROBLEM — R00.1 BRADYCARDIA, SINUS: Status: ACTIVE | Noted: 2023-01-25

## 2023-01-25 PROBLEM — E78.00 PURE HYPERCHOLESTEROLEMIA: Status: ACTIVE | Noted: 2023-01-25

## 2023-01-25 PROBLEM — K76.6 PORTAL HYPERTENSION (HCC): Status: ACTIVE | Noted: 2023-01-25

## 2023-01-25 PROBLEM — K74.60 CIRRHOSIS OF LIVER WITHOUT ASCITES (HCC): Status: ACTIVE | Noted: 2023-01-23

## 2023-01-25 PROBLEM — I63.9 CEREBROVASCULAR ACCIDENT (CVA) (HCC): Status: ACTIVE | Noted: 2023-01-25

## 2023-01-25 PROBLEM — D69.6 THROMBOCYTOPENIA (HCC): Status: ACTIVE | Noted: 2023-01-23

## 2023-01-25 PROBLEM — D64.9 ANEMIA: Status: ACTIVE | Noted: 2023-01-23

## 2023-01-25 LAB
ABO/RH: NORMAL
AFP-TUMOR MARKER: 2 NG/ML (ref 0–9)
ALBUMIN SERPL-MCNC: 3.4 G/DL (ref 3.5–5.2)
ALP BLD-CCNC: 39 U/L (ref 35–104)
ALPHA-1 ANTITRYPSIN: 147 MG/DL (ref 90–200)
ALT SERPL-CCNC: 21 U/L (ref 0–32)
ANION GAP SERPL CALCULATED.3IONS-SCNC: 9 MMOL/L (ref 7–16)
ANTIBODY SCREEN: NORMAL
AST SERPL-CCNC: 17 U/L (ref 0–31)
BASOPHILS ABSOLUTE: 0 E9/L (ref 0–0.2)
BASOPHILS RELATIVE PERCENT: 0 % (ref 0–2)
BILIRUB SERPL-MCNC: 0.4 MG/DL (ref 0–1.2)
BLOOD BANK DISPENSE STATUS: NORMAL
BLOOD BANK PRODUCT CODE: NORMAL
BPU ID: NORMAL
BUN BLDV-MCNC: 38 MG/DL (ref 6–23)
CALCIUM SERPL-MCNC: 8.4 MG/DL (ref 8.6–10.2)
CHLORIDE BLD-SCNC: 105 MMOL/L (ref 98–107)
CO2: 21 MMOL/L (ref 22–29)
CREAT SERPL-MCNC: 1 MG/DL (ref 0.5–1)
DESCRIPTION BLOOD BANK: NORMAL
EOSINOPHILS ABSOLUTE: 0 E9/L (ref 0.05–0.5)
EOSINOPHILS RELATIVE PERCENT: 0 % (ref 0–6)
GFR SERPL CREATININE-BSD FRML MDRD: >60 ML/MIN/1.73
GLUCOSE BLD-MCNC: 322 MG/DL (ref 74–99)
HCT VFR BLD CALC: 20.5 % (ref 34–48)
HCT VFR BLD CALC: 26.9 % (ref 34–48)
HEMOGLOBIN: 6.9 G/DL (ref 11.5–15.5)
HEMOGLOBIN: 8.8 G/DL (ref 11.5–15.5)
INR BLD: 1.5
LYMPHOCYTES ABSOLUTE: 0.22 E9/L (ref 1.5–4)
LYMPHOCYTES RELATIVE PERCENT: 16 % (ref 20–42)
MAGNESIUM: 2.2 MG/DL (ref 1.6–2.6)
MCH RBC QN AUTO: 31.2 PG (ref 26–35)
MCHC RBC AUTO-ENTMCNC: 33.7 % (ref 32–34.5)
MCV RBC AUTO: 92.8 FL (ref 80–99.9)
METER GLUCOSE: 338 MG/DL (ref 74–99)
METER GLUCOSE: 338 MG/DL (ref 74–99)
METER GLUCOSE: 354 MG/DL (ref 74–99)
METER GLUCOSE: 470 MG/DL (ref 74–99)
MONOCYTES ABSOLUTE: 0.04 E9/L (ref 0.1–0.95)
MONOCYTES RELATIVE PERCENT: 3 % (ref 2–12)
NEUTROPHILS ABSOLUTE: 1.13 E9/L (ref 1.8–7.3)
NEUTROPHILS RELATIVE PERCENT: 81 % (ref 43–80)
NUCLEATED RED BLOOD CELLS: 1 /100 WBC
ORGANISM: ABNORMAL
OVALOCYTES: ABNORMAL
PDW BLD-RTO: 14.5 FL (ref 11.5–15)
PHOSPHORUS: 4.5 MG/DL (ref 2.5–4.5)
PLATELET # BLD: 29 E9/L (ref 130–450)
PLATELET # BLD: 30 E9/L (ref 130–450)
PLATELET CONFIRMATION: NORMAL
PLATELET CONFIRMATION: NORMAL
PMV BLD AUTO: 11.4 FL (ref 7–12)
POIKILOCYTES: ABNORMAL
POLYCHROMASIA: ABNORMAL
POTASSIUM SERPL-SCNC: 4.7 MMOL/L (ref 3.5–5)
PROTHROMBIN TIME: 16.7 SEC (ref 9.3–12.4)
RBC # BLD: 2.21 E12/L (ref 3.5–5.5)
SODIUM BLD-SCNC: 135 MMOL/L (ref 132–146)
TOTAL PROTEIN: 5.2 G/DL (ref 6.4–8.3)
URINE CULTURE, ROUTINE: ABNORMAL
WBC # BLD: 1.4 E9/L (ref 4.5–11.5)

## 2023-01-25 PROCEDURE — 82962 GLUCOSE BLOOD TEST: CPT

## 2023-01-25 PROCEDURE — 6370000000 HC RX 637 (ALT 250 FOR IP): Performed by: INTERNAL MEDICINE

## 2023-01-25 PROCEDURE — 6360000002 HC RX W HCPCS: Performed by: INTERNAL MEDICINE

## 2023-01-25 PROCEDURE — A4216 STERILE WATER/SALINE, 10 ML: HCPCS | Performed by: INTERNAL MEDICINE

## 2023-01-25 PROCEDURE — 83735 ASSAY OF MAGNESIUM: CPT

## 2023-01-25 PROCEDURE — 85014 HEMATOCRIT: CPT

## 2023-01-25 PROCEDURE — 2580000003 HC RX 258: Performed by: INTERNAL MEDICINE

## 2023-01-25 PROCEDURE — 85018 HEMOGLOBIN: CPT

## 2023-01-25 PROCEDURE — 2060000000 HC ICU INTERMEDIATE R&B

## 2023-01-25 PROCEDURE — 86901 BLOOD TYPING SEROLOGIC RH(D): CPT

## 2023-01-25 PROCEDURE — 80053 COMPREHEN METABOLIC PANEL: CPT

## 2023-01-25 PROCEDURE — 84100 ASSAY OF PHOSPHORUS: CPT

## 2023-01-25 PROCEDURE — APPSS60 APP SPLIT SHARED TIME 46-60 MINUTES

## 2023-01-25 PROCEDURE — 86900 BLOOD TYPING SEROLOGIC ABO: CPT

## 2023-01-25 PROCEDURE — 85049 AUTOMATED PLATELET COUNT: CPT

## 2023-01-25 PROCEDURE — 86850 RBC ANTIBODY SCREEN: CPT

## 2023-01-25 PROCEDURE — 85025 COMPLETE CBC W/AUTO DIFF WBC: CPT

## 2023-01-25 PROCEDURE — C9113 INJ PANTOPRAZOLE SODIUM, VIA: HCPCS | Performed by: INTERNAL MEDICINE

## 2023-01-25 PROCEDURE — 6370000000 HC RX 637 (ALT 250 FOR IP)

## 2023-01-25 PROCEDURE — 36430 TRANSFUSION BLD/BLD COMPNT: CPT

## 2023-01-25 PROCEDURE — 85610 PROTHROMBIN TIME: CPT

## 2023-01-25 PROCEDURE — 36415 COLL VENOUS BLD VENIPUNCTURE: CPT

## 2023-01-25 PROCEDURE — P9073 PLATELETS PHERESIS PATH REDU: HCPCS

## 2023-01-25 PROCEDURE — 99232 SBSQ HOSP IP/OBS MODERATE 35: CPT | Performed by: STUDENT IN AN ORGANIZED HEALTH CARE EDUCATION/TRAINING PROGRAM

## 2023-01-25 RX ORDER — SODIUM CHLORIDE 9 MG/ML
INJECTION, SOLUTION INTRAVENOUS PRN
Status: DISCONTINUED | OUTPATIENT
Start: 2023-01-25 | End: 2023-01-25 | Stop reason: SDUPTHER

## 2023-01-25 RX ORDER — SODIUM CHLORIDE 9 MG/ML
INJECTION, SOLUTION INTRAVENOUS PRN
Status: DISCONTINUED | OUTPATIENT
Start: 2023-01-25 | End: 2023-01-27 | Stop reason: HOSPADM

## 2023-01-25 RX ORDER — INSULIN LISPRO 100 [IU]/ML
4 INJECTION, SOLUTION INTRAVENOUS; SUBCUTANEOUS ONCE
Status: COMPLETED | OUTPATIENT
Start: 2023-01-25 | End: 2023-01-25

## 2023-01-25 RX ADMIN — INSULIN LISPRO 16 UNITS: 100 INJECTION, SOLUTION INTRAVENOUS; SUBCUTANEOUS at 09:33

## 2023-01-25 RX ADMIN — DEXAMETHASONE 40 MG: 4 TABLET ORAL at 09:28

## 2023-01-25 RX ADMIN — SODIUM CHLORIDE, PRESERVATIVE FREE 40 MG: 5 INJECTION INTRAVENOUS at 21:03

## 2023-01-25 RX ADMIN — INSULIN LISPRO 4 UNITS: 100 INJECTION, SOLUTION INTRAVENOUS; SUBCUTANEOUS at 21:12

## 2023-01-25 RX ADMIN — INSULIN LISPRO 4 UNITS: 100 INJECTION, SOLUTION INTRAVENOUS; SUBCUTANEOUS at 21:13

## 2023-01-25 RX ADMIN — INSULIN LISPRO 12 UNITS: 100 INJECTION, SOLUTION INTRAVENOUS; SUBCUTANEOUS at 11:55

## 2023-01-25 RX ADMIN — SODIUM CHLORIDE, PRESERVATIVE FREE 10 ML: 5 INJECTION INTRAVENOUS at 21:04

## 2023-01-25 RX ADMIN — INSULIN LISPRO 12 UNITS: 100 INJECTION, SOLUTION INTRAVENOUS; SUBCUTANEOUS at 17:09

## 2023-01-25 RX ADMIN — SODIUM CHLORIDE, PRESERVATIVE FREE 10 ML: 5 INJECTION INTRAVENOUS at 08:50

## 2023-01-25 RX ADMIN — SODIUM CHLORIDE, PRESERVATIVE FREE 40 MG: 5 INJECTION INTRAVENOUS at 09:28

## 2023-01-25 RX ADMIN — WATER 1000 MG: 1 INJECTION INTRAMUSCULAR; INTRAVENOUS; SUBCUTANEOUS at 21:03

## 2023-01-25 RX ADMIN — LEVOTHYROXINE SODIUM 88 MCG: 0.09 TABLET ORAL at 06:12

## 2023-01-25 RX ADMIN — ATORVASTATIN CALCIUM 40 MG: 40 TABLET, FILM COATED ORAL at 21:03

## 2023-01-25 ASSESSMENT — PAIN SCALES - GENERAL
PAINLEVEL_OUTOF10: 0

## 2023-01-25 NOTE — PROGRESS NOTES
[facility] Progress Note    Admitting Date and Time: 1/22/2023  3:43 PM  Admit Dx: Upper GI bleed [K92.2]  Esophageal bleed, non-variceal [K22.89]    Subjective:  Ms. Zi Urban is a 78-year-old female with past medical history significant for stroke, type II DM, hypertension, cirrhosis, portal hypertension, esophageal varices, ITP, hypothyroidism who presents with acute blood loss anemia found to have esophageal varices on EGD 1/23 with stigmata of recent bleeding s/p banding. No acute events overnight. She received 1 unit PRBC this morning for hemoglobin 6.9. She denies complaints this morning including chest pain, shortness of breath, nausea, vomiting, headache. ROS: denies fever, chills, cp, sob, n/v, HA unless stated above.       insulin lispro  0-16 Units SubCUTAneous TID WC    insulin lispro  0-4 Units SubCUTAneous Nightly    atorvastatin  40 mg Oral Daily    levothyroxine  88 mcg Oral Daily    pantoprazole (PROTONIX) 40 mg injection  40 mg IntraVENous Q12H    dexamethasone  40 mg Oral Daily    nadolol  20 mg Oral Daily    sodium chloride flush  5-40 mL IntraVENous 2 times per day    cefTRIAXone (ROCEPHIN) IV  1,000 mg IntraVENous Q24H     sodium chloride, , PRN  sodium chloride, , PRN  sodium chloride, , PRN  sodium chloride flush, 5-40 mL, PRN  sodium chloride, , PRN  ondansetron, 4 mg, Q8H PRN   Or  ondansetron, 4 mg, Q6H PRN  polyethylene glycol, 17 g, Daily PRN  acetaminophen, 650 mg, Q6H PRN   Or  acetaminophen, 650 mg, Q6H PRN  albuterol, 2.5 mg, Q2H PRN  glucose, 4 tablet, PRN  dextrose bolus, 125 mL, PRN   Or  dextrose bolus, 250 mL, PRN  glucagon (rDNA), 1 mg, PRN  dextrose, , Continuous PRN       Objective:    BP (!) 103/56   Pulse (!) 46   Temp 97.4 °F (36.3 °C) (Oral)   Resp 18   Ht 5' 10\" (1.778 m)   Wt 222 lb 9.6 oz (101 kg)   SpO2 98%   BMI 31.94 kg/m²     General Appearance: alert and oriented to person, place and time and in no acute distress  Skin: warm and dry  Head: normocephalic and atraumatic  Eyes: pupils equal, round, and reactive to light, extraocular eye movements intact, conjunctivae normal  Neck: neck supple and non tender without mass   Pulmonary/Chest: clear to auscultation bilaterally- no wheezes, rales or rhonchi, normal air movement, no respiratory distress  Cardiovascular: normal rate, normal S1 and S2 and no carotid bruits  Abdomen: soft, non-tender, non-distended, normal bowel sounds, no masses or organomegaly  Extremities: no cyanosis, no clubbing and no edema  Neurologic: no cranial nerve deficit and speech normal        Recent Labs     01/23/23  0413 01/24/23  0430 01/25/23  0355    138 135   K 4.4 4.4 4.7   * 109* 105   CO2 23 20* 21*   BUN 36* 36* 38*   CREATININE 0.9 0.9 1.0   GLUCOSE 185* 287* 322*   CALCIUM 9.0 8.2* 8.4*         Recent Labs     01/24/23  0430 01/24/23  0940 01/24/23  1650 01/24/23  2215 01/25/23  0355   WBC 1.6* 2.2*  --   --  1.4*   RBC 2.30* 2.59*  --   --  2.21*   HGB 7.0* 8.0* 7.6* 7.4* 6.9*   HCT 21.0* 24.0* 23.2* 23.2* 20.5*   MCV 91.3 92.7  --   --  92.8   MCH 30.4 30.9  --   --  31.2   MCHC 33.3 33.3  --   --  33.7   RDW 14.7 14.8  --   --  14.5   PLT 29* 32*  --   --  29*   MPV 11.5 12.0  --   --  11.4         Radiology: No new imaging to report. Assessment:    Principal Problem:    Upper GI bleed  Active Problems:    Esophageal bleed, non-variceal    Thrombocythemia    Chronic ITP (idiopathic thrombocytopenic purpura) (HCC)    Acute blood loss anemia    Other cirrhosis of liver (HCC)    History of ITP    Pancytopenia (HCC)  Resolved Problems:    * No resolved hospital problems. *      Plan:  1. Upper GI bleed, with evidence of recent variceal bleed s/p banding 1/23-adult GI bland diet, IV Protonix 40 mg every 12 hours, s/p EGD on 1/23 that revealed esophageal varices with stigmata of recent bleeding s/p banding. 2.  Acute blood loss anemia-hemoglobin 6.6 on presentation, s/p 3 units PRBCs. Continue to trend hemoglobin with H/H every 12 hours. Likely secondary to recent variceal bleed given EGD findings as discussed above. 3.  Acute complicated UTI plan continue IV ceftriaxone 1000 mg every 24 hours, urine culture revealed E. coli sensitive to third-generation cephalosporin. 4.  Pancytopenia-s/p 1 pack of platelets, hematology consulted. 5.  ITP-platelets 84O on 0/06, s/p 1 pack of platelets, appreciate hematology recommendations. Hematology plans to start IV dexamethasone 40 mg for 4 days. Goal platelet count above 50,000.  6.  DM type II on insulin lispro subcu, hypoglycemic precautions, Accu-Cheks  7. Cryptogenic cirrhosis with known esophageal varices-confirmed on EGD 1/23.  8.  History of stroke hold Plavix, continue statin  9. History of right parotid carcinoma s/p right subtotal parotidectomy and radiation therapy (2012, 2013)-follows with the [facility]  10. Hypothyroidism-continue home Synthroid  11. Hypertension-hold irbesartan  12. Obesity class I- follow-up with PCP for diet and lifestyle modifications. 13.  Asymptomatic bradycardia-patient bradycardic even with nadolol held this morning, 1/25, to the 40s. We will consult cardiology for further recommendations. NOTE: This report was transcribed using voice recognition software. Every effort was made to ensure accuracy; however, inadvertent computerized transcription errors may be present.   Electronically signed by Alyssa Deras MD on 1/25/2023 at 11:32 AM

## 2023-01-25 NOTE — CARE COORDINATION
Transfer from ICU on 1/24. EGD w/esophageal variceal banding on 1/23. IV rocephin and protonix. Plan remains for pt to return home, no needs. Will follow.   Terri Webber, SENGN, RN  [facility] Case Management  [phone]

## 2023-01-25 NOTE — CONSULTS
Inpatient Cardiology Consultation      Reason for Consult: Bradycardia    Consulting Physician: Dr Andrea Malone    Requesting Physician:  Kwesi De Paz MD    Date of Consultation: 1/25/2023    HISTORY OF PRESENT ILLNESS:   Patient is a 78-year-old female who is not known to [facility] cardiology. 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 LV normal in size. Mild LVH. EF 62±5%. HPI:  Patient presented ER 1/22/2023 at 9:54 PM for hematic emesis with black stool for the last 24 hours. Patient with significant history for cirrhosis and esophageal varices on Plavix. Patient had drop in hemoglobin of 2 g while in ER and was given 2 PRBC. Patient also was found to have UTI and treated with ATB. Patient initially went to ICU for close management until EGD scheduled for morning. EGD revealed grade 3-4 esophageal varices with stigmata of recent bleeding. 3 bands were placed for hemostasis. Stomach showed markedly severe portal hypertension gastropathy. Heme-onc consulted for thrombocytopenia. Patient was given 2 units of platelets. Today patient noted to have bradycardia into the 40s even after having nadolol held. Cardiology consulted.   Labs: Potassium 4.7, CO2 21, BUN 38, creatinine 1.0, magnesium 2.2, glucose 322, serum calcium 8.4, total protein 5.2, troponin 11, cholesterol 100, HDL 32, LDL 47, triglycerides 103, albumin 3.4, TSH 1/23: 0.711, T4 7.7, WBC 1.4, H&H 8.8/26.9, platelet 29, INR 1.5, blood cultures negative, UA infectious  CT abdomen: Cirrhosis with portal hypertension manifesting as splenomegaly, ascending colon wall thickening, small volume of ascites and collateral vessels including esophageal varices. Diverticulosis without evidence of diverticulitis. CXR: No acute process  EGD: Grade 3/4 esophageal varices with stigmata of recent bleed. 3 bands were placed with hemostasis. Stomach showed markedly severe portal hypertension gastropathy, changes with severe congestion. Duodenum unremarkable. No biopsies were done in view of pain to severe thrombocytopenia    Upon assessment today 1/25/2023 patient is lying semi-Santiago in hospital bed on room air. Patient is alert and oriented, speaking full sentences, and is in no apparent distress at this time. Patient reports she came in for hematemesis and dark stools with abdominal pain. She reports since receiving esophageal varices banding her abdominal pain and other symptoms have been getting better. She currently denies chest pain, SOB, FRITZ, dizziness, syncope, palpitations, diaphoresis, orthopnea, or PND. She reports she has never been told that she had a low heart rate and she has never been worked up by a cardiologist in the past.  She is a non-smoker, nondrinker and does not use drugs. She reports no significant early cardiac family history. She reports compliance with home medications including Plavix, Lipitor, and irbesartan. Clinically patient appears euvolemic with unremarkable physical exam otherwise. Telemetry reveals sinus bradycardia in the low 50s with infrequent PACs. The lowest documented heart rate was 38 bpm which was in the early morning hours. ZAN: +6.9 L    Most recent VS 97.4, 18 respirations, 46 pulse, 103/56, 98% room air. Please note: past medical records were reviewed per electronic medical record (EMR) - see detailed reports under Past Medical/ Surgical History.    Past Medical History:    Hyper tension  Hypothyroidism  CVA in 6756 felt to be embolic from right carotid disease/currently on Plavix  Carotid disease, CEA right-sided 2012  Diabetes  Splenomegaly  History of parotid cancer s/p right subtotal resection 2012  New liver cirrhosis with esophageal varices  History of ITP over 27 years ago  Worsening pancytopenia with normal marrow studies at [facility]    Cardiac studies:  Stress echo 6/2013: Negative for ischemia at 127% of MPHR. LV normal in size. Mild LVH. EF 62±5%. Past Surgical History:    Past Surgical History:   Procedure Laterality Date    CHOLECYSTECTOMY      UPPER GASTROINTESTINAL ENDOSCOPY  1/23/2023    EGD BAND LIGATION performed by Hayley Slater MD at [facility] ENDOSCOPY       Medications Prior to admit:  Prior to Admission medications    Medication Sig Start Date End Date Taking?  Authorizing Provider   levothyroxine (SYNTHROID) 88 MCG tablet Take 1 tablet Monday through Saturday, 1.5 tablets on Sunday 12/12/22   FATOUMATA James   Dulaglutide (TRULICITY) 1.5 DM/8.1CG SOPN Inject 1.5 mg into the skin once a week 7/6/22   FATOUMATA James   atorvastatin (LIPITOR) 40 MG tablet Take 40 mg by mouth daily    Historical Provider, MD   clopidogrel (PLAVIX) 75 MG tablet  1/10/22   Historical Provider, MD   ferrous sulfate (IRON 325) 325 (65 Fe) MG tablet Take 325 mg by mouth daily (with breakfast) 3/7/22 6/5/22  Historical Provider, MD   irbesartan (AVAPRO) 150 MG tablet  2/20/22   Historical Provider, MD   metFORMIN (GLUCOPHAGE) 1000 MG tablet Take 1 tablet by mouth 2 times daily (with meals) 4/6/22   FATOUMATA James   TRESIBA FLEXTOUCH 100 UNIT/ML SOPN Inject 48 units subcutaneous  at night 4/6/22   FATOUMATA James       Current Medications:    Current Facility-Administered Medications: 0.9 % sodium chloride infusion, , IntraVENous, PRN  insulin lispro (HUMALOG) injection vial 0-16 Units, 0-16 Units, SubCUTAneous, TID WC  insulin lispro (HUMALOG) injection vial 0-4 Units, 0-4 Units, SubCUTAneous, Nightly  atorvastatin (LIPITOR) tablet 40 mg, 40 mg, Oral, Daily  levothyroxine (SYNTHROID) tablet 88 mcg, 88 mcg, Oral, Daily  0.9 % sodium chloride infusion, , IntraVENous, PRN  pantoprazole (PROTONIX) 40 mg in sodium chloride (PF) 0.9 % 10 mL injection, 40 mg, IntraVENous, Q12H  dexamethasone (DECADRON) tablet 40 mg, 40 mg, Oral, Daily  nadolol (CORGARD) tablet 20 mg, 20 mg, Oral, Daily  0.9 % sodium chloride infusion, , IntraVENous, PRN  sodium chloride flush 0.9 % injection 5-40 mL, 5-40 mL, IntraVENous, 2 times per day  sodium chloride flush 0.9 % injection 5-40 mL, 5-40 mL, IntraVENous, PRN  0.9 % sodium chloride infusion, , IntraVENous, PRN  ondansetron (ZOFRAN-ODT) disintegrating tablet 4 mg, 4 mg, Oral, Q8H PRN **OR** ondansetron (ZOFRAN) injection 4 mg, 4 mg, IntraVENous, Q6H PRN  polyethylene glycol (GLYCOLAX) packet 17 g, 17 g, Oral, Daily PRN  acetaminophen (TYLENOL) tablet 650 mg, 650 mg, Oral, Q6H PRN **OR** acetaminophen (TYLENOL) suppository 650 mg, 650 mg, Rectal, Q6H PRN  albuterol (PROVENTIL) nebulizer solution 2.5 mg, 2.5 mg, Nebulization, Q2H PRN  cefTRIAXone (ROCEPHIN) 1,000 mg in sterile water 10 mL IV syringe, 1,000 mg, IntraVENous, Q24H  glucose chewable tablet 16 g, 4 tablet, Oral, PRN  dextrose bolus 10% 125 mL, 125 mL, IntraVENous, PRN **OR** dextrose bolus 10% 250 mL, 250 mL, IntraVENous, PRN  glucagon (rDNA) injection 1 mg, 1 mg, SubCUTAneous, PRN  dextrose 10 % infusion, , IntraVENous, Continuous PRN    Allergies:  Shellfish-derived products, Bacitracin, Codeine, Penicillins, Azithromycin, Erythromycin, and Metoprolol    Social History:    Social History     Socioeconomic History    Marital status:      Spouse name: Not on file    Number of children: Not on file    Years of education: Not on file    Highest education level: Not on file   Occupational History    Not on file   Tobacco Use    Smoking status: Never    Smokeless tobacco: Never   Vaping Use    Vaping Use: Never used   Substance and Sexual Activity    Alcohol use: Never    Drug use: Not on file    Sexual activity: Not on file   Other Topics Concern    Not on file   Social History Narrative    Not on file     Social Determinants of Health     Financial Resource Strain: Not on file   Food Insecurity: Not on file   Transportation Needs: Not on file   Physical Activity: Not on file   Stress: Not on file   Social Connections: Not on file   Intimate Partner Violence: Not on file   Housing Stability: Not on file       Family History:   History reviewed. No pertinent family history. REVIEW OF SYSTEMS:     Constitutional: Denies fevers, chills, night sweats, and fatigue  HEENT: Denies headaches, nose bleeds, and blurred vision,oral pain, abscess or lesion. Musculoskeletal: Denies falls, pain to BLE with ambulation and edema to BLE. Neurological: Denies dizziness and lightheadedness, numbness and tingling  Cardiovascular: Denies chest pain, palpitations, and feelings of heart racing. Respiratory: Denies orthopnea and PND, FRITZ/SOB. Gastrointestinal: Denies heartburn. Resolved to hematemesis. Resolving abdominal pain   Genitourinary: Denies dysuria and hematuria  Hematologic: Denies excessive bruising or bleeding  Lymphatic: Denies lumps and bumps to neck, axilla, breast, and groin  Endocrine: Denies excessive thirst. Denies intolerance to hot and cold  GYN: Postmenopausal state; Denies vaginal bleeding. Psychiatric: Denies anxiety and depression. PHYSICAL EXAM:   BP (!) 103/56   Pulse (!) 46   Temp 97.4 °F (36.3 °C) (Oral)   Resp 18   Ht 5' 10\" (1.778 m)   Wt 222 lb 9.6 oz (101 kg)   SpO2 98%   BMI 31.94 kg/m²   CONST:  Well developed, well nourished 53-year-old  female who appears stated age. Awake, alert, cooperative, no apparent distress  HEENT:   Head- Normocephalic, atraumatic   Eyes- Conjunctivae pink, anicteric  Throat- Oral mucosa pink and moist  Neck-  No stridor, trachea midline, no jugular venous distention. No adenopathy   CHEST: Chest symmetrical and non-tender to palpation.  No accessory muscle use or intercostal retractions  RESPIRATORY: Lung sounds - clear throughout fields   CARDIOVASCULAR:     No carotid bruit  Heart Inspection- shows no noted pulsations  Heart Palpation- no heaves or thrills; PMI is non-displaced   Heart Ausculation- Regular rate and rhythm, no murmur. No s3, s4 or rub   PV: No lower extremity edema. No varicosities. Pedal pulses palpable, no clubbing or cyanosis   ABDOMEN: Soft, non-tender to light palpation. Bowel sounds present. No palpable masses no organomegaly; no abdominal bruit  MS: Good muscle strength and tone. No atrophy or abnormal movements. : Deferred  SKIN: Warm and dry no statis dermatitis or ulcers   NEURO / PSYCH: Oriented to person, place and time. Speech clear and appropriate. Follows all commands. Pleasant affect     DATA:    ECG: Normal sinus rhythm with nonspecific ST abnormality. Vent rate 70, MI interval 128, QRS duration 86, QTc 462. Tele strips: Sinus bradycardia in the low 50s with infrequent PACs. Lowest documented heart rate sinus bradycardia 38 bpm early morning hours    Diagnostic:      Intake/Output Summary (Last 24 hours) at 1/25/2023 1205  Last data filed at 1/25/2023 8799  Gross per 24 hour   Intake 379 ml   Output --   Net 379 ml       Labs:   CBC:   Recent Labs     01/24/23  0940 01/24/23  1650 01/24/23  2215 01/25/23  0355   WBC 2.2*  --   --  1.4*   HGB 8.0*   < > 7.4* 6.9*   HCT 24.0*   < > 23.2* 20.5*   PLT 32*  --   --  29*    < > = values in this interval not displayed.      BMP:   Recent Labs     01/24/23  0430 01/25/23  0355    135   K 4.4 4.7   CO2 20* 21*   BUN 36* 38*   CREATININE 0.9 1.0   LABGLOM >60 >60   CALCIUM 8.2* 8.4*     Mag:   Recent Labs     01/24/23  0940 01/25/23  0355   MG 2.2 2.2     Phos:   Recent Labs     01/24/23  0430 01/25/23  0355   PHOS 4.5 4.5     TSH:   Recent Labs     01/23/23  1130   TSH 0.711     HgA1c:   Lab Results   Component Value Date    LABA1C 7.0 11/09/2022       PT/INR: Recent Labs     01/24/23  0430 01/25/23  0355   PROTIME 17.0* 16.7*   INR 1.5 1.5     APTT:  Recent Labs     01/22/23  1628   APTT 28.9       FASTING LIPID PANEL:  Lab Results   Component Value Date/Time    CHOL 101 12/01/2022 12:00 AM    HDL 32 01/23/2023 11:30 AM    LDLCALC 47 01/23/2023 11:30 AM    TRIG 100 12/01/2022 12:00 AM     LIVER PROFILE:  Recent Labs     01/24/23  0430 01/25/23  0355   AST 17 17   ALT 19 21   LABALBU 3.3* 3.4*      Latest Reference Range & Units 1/23/23 03:05   Troponin, High Sensitivity 0 - 9 ng/L 11 (H)   (H): Data is abnormally high     Latest Reference Range & Units 1/23/23 11:30   T4, Total 4.5 - 11.7 mcg/dL 7.7   TSH 0.270 - 4.200 uIU/mL 0.711     CT abdomen:  Impression   Cirrhosis with portal hypertension manifesting as splenomegaly, ascending   colon wall thickening, small volume of ascites and collateral vessels   including esophageal varices. Diverticulosis without evidence of acute diverticulitis. CXR:     Impression   No acute process. Assessment and plan to follow as per Dr Sukhwinder Gonzalez. Electronically signed by FATOUMATA Gaytan CNP on 1/25/2023 at 12:05 PM      The above documentation has been prepared under my direction and personally reviewed by me in its entirety. I confirm that the note above accurately reflects all work, treatment, procedures, and medical decision making performed by me. The patient's history was independently obtained. The patient was independently examined. Electrocardiogram, prior and present cardiovascular assessment, and laboratory studies were reviewed. The patient is a 57-year-old white female with no association to [facility] and no known history of structural heart disease.   She has a remote history of a cerebrovascular accident felt embolic from a right internal carotid artery source with maintenance on antiplatelet therapy and a prior carotid endarterectomy in addition to that of diabetes, hyperlipidemia, parotid carcinoma with prior subtotal resection, idiopathic thrombocytopenic purpura with chronic thrombocytopenia as well as that of nonalcoholic cirrhosis. She presented with abdominal discomfort and hematemesis in addition to that of melena and on presentation was noted to have evidence of a significant anemia. A resting electrocardiogram time of hospitalization and reviewed at time of assessment demonstrated evidence of sinus rhythm with nonspecific ST changes and review of initial arrhythmia monitoring demonstrated evidence of sinus rhythm. Initial hemoglobin level of approximately 8 g/dL and platelet counts of approximately 30,000 were present and she is initially admitted to the intensive care unit with subsequent evaluation by the hematology, gastroenterology and general surgical services. She initially underwent endoscopic evaluation consistent with that of a severe portal hypertensive gastropathy with esophageal variceal banding in the face of findings suggesting recent bleeding and on the basis of her portal hypertension beta-blocker therapy with nadolol was recommended. Throughout the course of hospitalization including following recovery from a single dose of her beta-blocker, persistent sinus bradycardia was noted with heart rates during waking hours of approximately 50 bpm in the absence of symptoms or hemodynamic compromise and withholding of subsequent beta-blocker therapy. During the course of hospitalization, she denies any cardiovascular symptomatology and reports no lightheadedness or near syncope. At the time of evaluation, the patient's medications and allergies were reviewed as well is that of her past medical history and review of systems as documented. On examination, she presently appears in no discomfort nor distress and is hemodynamically stable with vital signs as documented sinus bradycardia noted on arrhythmia monitoring.   Jugular venous pressure is normal with no identified carotid bruits. Lung fields are clear to auscultation. Cardiac examination is notable for a regular rate and rhythm with no gallop rhythm or cardiac murmur. A benign abdominal examination is present with the exception of mild obesity and no peripheral edema noted. Diagnostic Assessment and Plan: On a clinical basis, the patient presents with no active cardiovascular symptomatology and present evidence of sinus bradycardia in the absence of additional conduction abnormalities. A component of her bradycardia may be related to her bedrest condition throughout the majority of hospitalization, especially in view of review of prior heart rates demonstrating evidence of rates suggestive of sinus rhythm. At present, the withholding of beta-blocker therapy would be advisable in spite of its potential benefit in the face of her portal hypertension with needs of appropriate heart rate monitoring with a resumption of activities to determine ability to initiate a beta-blocker, potentially with dose reduction from that initially chosen. At present no cardiovascular intervention is indicated. Additional management presently will be deferred to primary care in addition to that of the gastroenterology and hematology service on the basis of her more significant active medical issues. Ongoing appropriate lifestyle modification to achieve weight reduction will benefit diastolic cardiac performance as well as reducing risk of the development of obstructive sleep apnea with its adverse cardiovascular effects in addition to that of needs of ongoing aggressive risk factor modification of blood pressure, diabetes and serum lipids in attempt to reduce risk of future atherosclerotic development. We will further evaluate her should additional cardiovascular difficulties or concerns arise.     I have participated in a substantive portion of the present encounter inclusive of a component of independent history and examination in addition to that of medical decision making regarding patient care. Thank you for allowing me to participate in your patient's care. Please feel free to contact me if you have any questions or concerns. Margarita Valentine.  Andrea Malone, [facility]

## 2023-01-25 NOTE — PROGRESS NOTES
[facility] Quality Flow/Interdisciplinary Rounds Progress Note        Quality Flow Rounds held on January 25, 2023    Disciplines Attending:  Bedside Nurse, , , and Nursing Unit Leadership    Marquis Estrella was admitted on 1/22/2023  3:43 PM    Anticipated Discharge Date:   1/26    Disposition: home    Pal Score:  Pal Scale Score: 20    Readmission Risk              Risk of Unplanned Readmission:  18           Discussed patient goal for the day, patient clinical progression, and barriers to discharge.   The following Goal(s) of the Day/Commitment(s) have been identified:   await gi plan, continue iv abx      Sumi Mcdonald RN  January 25, 2023

## 2023-01-25 NOTE — PROGRESS NOTES
[facility]  Hematology/Oncology  Consult      Patient Name: Cheryl oLve  YOB: 1960  PCP: Janay Galarza MD   Referring Provider:      Reason for Consultation:   Chief Complaint   Patient presents with    Emesis     Throwing up bright red blood this AM    Melena    Abdominal Pain      Interval history: Feels much better today post transfusion. No more black tarry stools. 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 She had marrow evaluation in 2015 and 2017 and peripheral blood for myeloid NGS in 2018. Since that time worsening cytopenias. She then had a another marrow in November 2022 which showed normocellular marrow without acute abnormalities, increased megakaryocytes noted. PET scan was largely unrevealing although did show cirrhotic liver morphology with subsequent significant splenomegaly at 24 cm. Given relatively bland marrow she was started on treatment for presumed ITP with Doptelet 20mg daily. 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 Patient presented to the ED for evaluation of hematemesis and black stool ongoing for 24 hours. CT abd revealed cirrhosis and portal hypertension with splenomegaly and esophageal varices. CBC revealed hgb 6.6 patient s/p 1 unit of pRBC's. 2 more ordered. Positive UA on Rocephin. GI and General surgery following patient for EGD with possible banding today. ICU for further care. AFP, ROSETTA, CEA, iron profile, IG's, hepatitis panel pending. CMP with BUN 36, Mg 1.3. LFT's unremarkable. CBC with WBC 2.3, hgb 7.8 s/p 1 unit of pRBC's yesterday, platelets 24. Consultation for pancytopenia presumed ITP. Review of systems: Over 10 systems were reviewed and all were negative except as mentioned above. Diagnostic Data:     Past Medical History:   Diagnosis Date    Cerebral artery occlusion with cerebral infarction ([facility]     Diabetes mellitus ([facility] [address]        Patient Active Problem List    Diagnosis Date Noted    Thrombocythemia 01/23/2023    Chronic ITP (idiopathic thrombocytopenic purpura) ([facility] [address] 01/23/2023    Acute blood loss anemia 01/23/2023    Other cirrhosis of liver ([facility] [address] 01/23/2023    History of ITP 01/23/2023    Pancytopenia ([facility] [address] 01/23/2023    Upper GI bleed 01/22/2023    Esophageal bleed, non-variceal 01/22/2023        Past Surgical History:   Procedure Laterality Date    CHOLECYSTECTOMY      UPPER GASTROINTESTINAL ENDOSCOPY  1/23/2023    EGD BAND LIGATION performed by Zoey Pierre MD at [facility] ENDOSCOPY       Family History  History reviewed. No pertinent family history. Social History    TOBACCO:   reports that she has never smoked. She has never used smokeless tobacco.  ETOH:   reports no history of alcohol use. Home Medications  Prior to Admission medications    Medication Sig Start Date End Date Taking?  Authorizing Provider   levothyroxine (SYNTHROID) 88 MCG tablet Take 1 tablet Monday through Saturday, 1.5 tablets on Sunday 12/12/22   FATOUMATA Lloyd - CNS   Dulaglutide (TRULICITY) 1.5 FJ/7.4BA SOPN Inject 1.5 mg into the skin once a week 7/6/22   FATOUMATA Lloyd   atorvastatin (LIPITOR) 40 MG tablet Take 40 mg by mouth daily    Historical Provider, MD   clopidogrel (PLAVIX) 75 MG tablet  1/10/22   Historical Provider, MD   ferrous sulfate (IRON 325) 325 (65 Fe) MG tablet Take 325 mg by mouth daily (with breakfast) 3/7/22 6/5/22  Historical Provider, MD   irbesartan (AVAPRO) 150 MG tablet  2/20/22   Historical Provider, MD   metFORMIN (GLUCOPHAGE) 1000 MG tablet Take 1 tablet by mouth 2 times daily (with meals) 4/6/22   Tato Majano, [address] [place] [address] UNIT/ML SOPN Inject 48 units subcutaneous  at night 4/6/22   FATOUMATA Short - CNS       Allergies  Allergies   Allergen Reactions    Shellfish-Derived Products Hives    Bacitracin Itching    Codeine      Other reaction(s): Other: See Comments, Unknown  Tingling arms, heart palpitations      Penicillins      Other reaction(s): Unknown  In childhood      Azithromycin Rash    Erythromycin Rash    Metoprolol Rash         Objective  BP (!) 113/52   Pulse 51   Temp 97.5 °F (36.4 °C) (Oral)   Resp 18   Ht 5' 10\" (1.778 m)   Wt 222 lb 9.6 oz (101 kg)   SpO2 99%   BMI 31.94 kg/m²     Physical Exam:   Performance Status:  General: AAO to person, place, time, in no acute distress,   Head and neck : PERRLA, EOMI . Sclera non icteric. Oropharynx : Clear  Neck: no JVD,  no adenopathy  Heart: Regular rate and regular rhythm, no murmur  Lungs: Clear to auscultation   Extremities: No edema,no cyanosis, no clubbing. Abdomen: Soft, non-tender;no masses, no organomegaly  Skin:  No rash. Neurologic:Cranial nerves grossly intact. No focal motor or sensory deficits .     Recent Laboratory Data-   Lab Results   Component Value Date    WBC 1.4 (L) 01/25/2023    HGB 6.9 (LL) 01/25/2023    HCT 20.5 (L) 01/25/2023    MCV 92.8 01/25/2023    PLT 29 (L) 01/25/2023    LYMPHOPCT 16.0 (L) 01/25/2023    RBC 2.21 (L) 01/25/2023    MCH 31.2 01/25/2023    MCHC 33.7 01/25/2023    RDW 14.5 01/25/2023    NEUTOPHILPCT 81.0 (H) 01/25/2023    MONOPCT 3.0 01/25/2023    BASOPCT 0.0 01/25/2023    NEUTROABS 1.13 (L) 01/25/2023    LYMPHSABS 0.22 (L) 01/25/2023    MONOSABS 0.04 (L) 01/25/2023    EOSABS 0.00 (L) 01/25/2023    BASOSABS 0.00 01/25/2023       Lab Results   Component Value Date     01/25/2023    K 4.7 01/25/2023     01/25/2023    CO2 21 (L) 01/25/2023    BUN 38 (H) 01/25/2023    CREATININE 1.0 01/25/2023    GLUCOSE 322 (H) 01/25/2023    CALCIUM 8.4 (L) 01/25/2023    PROT 5.2 (L) 01/25/2023    LABALBU 3.4 (L) 01/25/2023 BILITOT 0.4 01/25/2023    ALKPHOS 39 01/25/2023    AST 17 01/25/2023    ALT 21 01/25/2023    LABGLOM >60 01/25/2023    GFRAA >60 01/18/2022       Lab Results   Component Value Date    IRON 100 01/23/2023    TIBC 272 01/23/2023    FERRITIN 110 01/23/2023           Radiology-    CT ABDOMEN PELVIS W IV CONTRAST Additional Contrast? None    Result Date: 1/22/2023  EXAMINATION: CT OF THE ABDOMEN AND PELVIS WITH CONTRAST 1/22/2023 3:34 pm TECHNIQUE: CT of the abdomen and pelvis was performed with the administration of intravenous contrast. Multiplanar reformatted images are provided for review. Automated exposure control, iterative reconstruction, and/or weight based adjustment of the mA/kV was utilized to reduce the radiation dose to as low as reasonably achievable. COMPARISON: None. HISTORY: ORDERING SYSTEM PROVIDED HISTORY: abd pain, rectal bleeding TECHNOLOGIST PROVIDED HISTORY: Reason for exam:->abd pain, rectal bleeding Additional Contrast?->None Decision Support Exception - unselect if not a suspected or confirmed emergency medical condition->Emergency Medical Condition (MA) FINDINGS: Lower Chest:  Visualized portion of the lower chest demonstrates no acute abnormality. Distal esophageal varices are noted. Organs: The liver demonstrates a cirrhotic morphology with hypertrophy of the caudate lobe and left lateral segment as well as a nodular surface. A recanalized paraumbilical vein is seen. The portal vein is patent. Status post cholecystectomy. No biliary ductal dilatation seen. Spleen is significantly enlarged measuring 21.0 cm. Prominent perisplenic collateral vessels are noted. The pancreas and adrenal glands are unremarkable. The kidneys enhance symmetrically without evidence of hydronephrosis. GI/Bowel: Stomach and duodenal sweep demonstrate no acute abnormality. There is no evidence of bowel obstruction. No evidence of abnormal bowel wall thickening or distension.   Mild edematous wall thickening of the ascending colon is noted, likely related to portal hypertension. The appendix is visualized and is unremarkable. No evidence of acute appendicitis. There is diverticulosis without evidence of diverticulitis. Pelvis:  Bladder is unremarkable in appearance. The uterus and adnexa are without acute abnormality. Peritoneum/Retroperitoneum: A small volume of ascites is seen, predominantly in the perihepatic and perisplenic locations. No free air is noted. No evidence of lymphadenopathy. Aorta is normal in caliber. Bones/Soft Tissues:  No acute abnormality of the visualized osseous structures. Cirrhosis with portal hypertension manifesting as splenomegaly, ascending colon wall thickening, small volume of ascites and collateral vessels including esophageal varices. Diverticulosis without evidence of acute diverticulitis. XR CHEST PORTABLE    Result Date: 1/23/2023  EXAMINATION: ONE XRAY VIEW OF THE CHEST 1/23/2023 5:52 am COMPARISON: None. HISTORY: ORDERING SYSTEM PROVIDED HISTORY: SOB TECHNOLOGIST PROVIDED HISTORY: Reason for exam:->SOB FINDINGS: The lungs are without acute focal process. There is no effusion or pneumothorax. The cardiomediastinal silhouette is without acute process. The osseous structures are without acute process. No acute process. 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 Workup as above. Started on treatment for presumed ITP with Doptelet 20mg daily. 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 - pancytopenia presumed ITP    - Hematemesis and black stool ongoing for 24 hours. - CT abd revealed cirrhosis and portal hypertension with splenomegaly and esophageal varices. - CBC revealed hgb 6.6 patient s/p 1 unit of pRBC's. 2 more ordered.    - Positive UA on Rocephin.   - GI and General surgery following patient for EGD with possible banding today. - ICU for further care. - AFP, ROSETTA, CEA, iron profile, IG's, hepatitis panel pending. CMP with BUN 36, Mg 1.3. LFT's unremarkable. - CBC with WBC 2.3, hgb 7.8 s/p 1 unit of pRBC's yesterday, platelets 24. Attending addendum: Thrombocytopenia most likely seems to be due to underlying cirrhosis and hypersplenism with superimposed consumption due to variceal bleed. Patient however has diagnosis of ITP made in the past from [facility]. S/p 2 units of platelet transfusion without any increment in platelet count. Will do dexamethasone 40 mg daily for 4 doses for ITP. Was scheduled to start Promacta at [facility] but has not started yet. Upper GI bleed: EGD planned today. Hemoglobin 7.6. Transfuse platelets to keep above 50,000. Will continue to follow. 1/24/23  - Thrombocytopenia as above. - S/p 2 units of platelet transfusion without any increment in platelet count. - Continue dexamethasone 40 mg daily for 4 doses for ITP. Was scheduled to start Promacta at [facility] but has not started yet. - Upper GI bleed: EGD with Grade 3/4 esophageal varices with stigmata of recent bleed. Three bands were placed with hemostasis. Stomach showed markedly severe portal hypertensive gastropathy, changes with severe congestion. Duodenum unremarkable. No biopsies were done in view of the patient's severe thrombocytopenia.   - Hemoglobin 8.0  Transfuse platelets to keep above 50,000. Platelets 32. Will continue to follow. 1/25/23  - Thrombocytopenia as above. S/p 2 units of platelet without any increment in platelet count  - Continue dexamethasone 40 mg daily for 4 doses for ITP. Was scheduled to start Promacta at [facility] but has not started yet. - Upper GI bleed: EGD with Grade 3/4 esophageal varices with stigmata of recent bleed. Three bands were placed with hemostasis.  Stomach showed markedly severe portal hypertensive gastropathy, changes with severe congestion. Duodenum unremarkable. No biopsies were done in view of the patient's severe thrombocytopenia.   - Hemoglobin 6.9. 1 unit of pRBC's ordered. Post hgb 8.8. Platelets remain low but stable 30. Giving and additional dose  - Cardiology consulted for bradycardia. - Will continue to follow      FATOUMATA Cuellar - CNP  Electronically signed 1/25/2023 at 11:01 AM  Patient seen and examined. Agree with [facility]'s assessment plan. Note updated.     Yobani Warner MD

## 2023-01-25 NOTE — PROGRESS NOTES
PROGRESS NOTE        Patient Presents with/Seen in Consultation For      Reason for Consult: active GI bleed hemoglobin 6.6  CHIEF COMPLAINT:  hematemesis     Subjective:     Patient seen laying in bed in no apparent distress, fatigued. Had 1 u RBC this am, no BM yet today. Tolerating diet, no complaints of epigastric pain or nausea. Review of Systems  Aside from what was mentioned in the PMH and HPI, essentially unremarkable, all others negative. Objective:     BP (!) 113/52   Pulse 51   Temp 97.5 °F (36.4 °C) (Oral)   Resp 18   Ht 5' 10\" (1.778 m)   Wt 222 lb 9.6 oz (101 kg)   SpO2 99%   BMI 31.94 kg/m²     General appearance: alert, awake, laying in bed, and cooperative  Eyes: conjunctiva pale, sclera anicteric. PERRL.   Lungs: clear to auscultation bilaterally  Heart: regular rate and rhythm, no murmur, 2+ pulses; no edema  Abdomen: soft, non-tender; bowel sounds normal; no masses,  no organomegaly  Extremities: extremities without edema  Pulses: 2+ and symmetric  Skin: Skin color, texture, turgor normal.   Neurologic: Grossly normal    0.9 % sodium chloride infusion, PRN  insulin lispro (HUMALOG) injection vial 0-16 Units, TID WC  insulin lispro (HUMALOG) injection vial 0-4 Units, Nightly  atorvastatin (LIPITOR) tablet 40 mg, Daily  levothyroxine (SYNTHROID) tablet 88 mcg, Daily  0.9 % sodium chloride infusion, PRN  pantoprazole (PROTONIX) 40 mg in sodium chloride (PF) 0.9 % 10 mL injection, Q12H  dexamethasone (DECADRON) tablet 40 mg, Daily  nadolol (CORGARD) tablet 20 mg, Daily  0.9 % sodium chloride infusion, PRN  sodium chloride flush 0.9 % injection 5-40 mL, 2 times per day  sodium chloride flush 0.9 % injection 5-40 mL, PRN  0.9 % sodium chloride infusion, PRN  ondansetron (ZOFRAN-ODT) disintegrating tablet 4 mg, Q8H PRN   Or  ondansetron (ZOFRAN) injection 4 mg, Q6H PRN  polyethylene glycol (GLYCOLAX) packet 17 g, Daily PRN  acetaminophen (TYLENOL) tablet 650 mg, Q6H PRN   Or  acetaminophen (TYLENOL) suppository 650 mg, Q6H PRN  albuterol (PROVENTIL) nebulizer solution 2.5 mg, Q2H PRN  cefTRIAXone (ROCEPHIN) 1,000 mg in sterile water 10 mL IV syringe, Q24H  glucose chewable tablet 16 g, PRN  dextrose bolus 10% 125 mL, PRN   Or  dextrose bolus 10% 250 mL, PRN  glucagon (rDNA) injection 1 mg, PRN  dextrose 10 % infusion, Continuous PRN       Data Review  CBC:   Lab Results   Component Value Date/Time    WBC 1.4 01/25/2023 03:55 AM    RBC 2.21 01/25/2023 03:55 AM    HGB 6.9 01/25/2023 03:55 AM    HCT 20.5 01/25/2023 03:55 AM    MCV 92.8 01/25/2023 03:55 AM    MCH 31.2 01/25/2023 03:55 AM    MCHC 33.7 01/25/2023 03:55 AM    RDW 14.5 01/25/2023 03:55 AM    PLT 29 01/25/2023 03:55 AM    MPV 11.4 01/25/2023 03:55 AM     CMP:    Lab Results   Component Value Date/Time     01/25/2023 03:55 AM    K 4.7 01/25/2023 03:55 AM     01/25/2023 03:55 AM    CO2 21 01/25/2023 03:55 AM    BUN 38 01/25/2023 03:55 AM    CREATININE 1.0 01/25/2023 03:55 AM    GFRAA >60 01/18/2022 03:15 PM    LABGLOM >60 01/25/2023 03:55 AM    GLUCOSE 322 01/25/2023 03:55 AM    PROT 5.2 01/25/2023 03:55 AM    LABALBU 3.4 01/25/2023 03:55 AM    CALCIUM 8.4 01/25/2023 03:55 AM    BILITOT 0.4 01/25/2023 03:55 AM    ALKPHOS 39 01/25/2023 03:55 AM    AST 17 01/25/2023 03:55 AM    ALT 21 01/25/2023 03:55 AM     Hepatic Function Panel:    Lab Results   Component Value Date/Time    ALKPHOS 39 01/25/2023 03:55 AM    ALT 21 01/25/2023 03:55 AM    AST 17 01/25/2023 03:55 AM    PROT 5.2 01/25/2023 03:55 AM    BILITOT 0.4 01/25/2023 03:55 AM    BILIDIR 0.2 01/22/2023 04:28 PM    IBILI 0.6 01/22/2023 04:28 PM    LABALBU 3.4 01/25/2023 03:55 AM     No components found for: CHLPL    Lab Results   Component Value Date    TRIG 100 12/01/2022       Lab Results   Component Value Date    HDL 32 01/23/2023    HDL 42 12/01/2022       Lab Results   Component Value Date    LDLCALC 47 01/23/2023    LDLCALC 40 12/01/2022       Lab Results   Component Value Date    LABVLDL 21 01/23/2023      PT/INR:    Lab Results   Component Value Date/Time    PROTIME 16.7 01/25/2023 03:55 AM    INR 1.5 01/25/2023 03:55 AM     IRON:    Lab Results   Component Value Date/Time    IRON 100 01/23/2023 11:30 AM     Iron Saturation:  No components found for: PERCENTFE  FERRITIN:    Lab Results   Component Value Date/Time    FERRITIN 110 01/23/2023 11:30 AM         Assessment:     Active Problems:  Hematemesis  Melena  Anemia, acute GI bleed  Cirrhosis, likely CORONEL  Pancytopenia  History of a stroke on Plavix  Fatigue  Weakness  History of ITP, follows [facility] hematology at [facility]  EGD 1/23/23- Grade 3/4 esophageal varices with stigmata of recent bleed. Three bands were placed with hemostasis. Stomach showed markedly severe portal hypertensive gastropathy, changes with severe congestion. Duodenum unremarkable. No biopsies were done in view of the patient's severe thrombocytopenia. Plan:   Hgb noted, pt received 1 u RBC  Monitor stools  Protonix 40 mg IV every 12  Nadolol 20 mg daily   GI soft diet   Medicate for nausea as needed   Monitor CBC, CMP and INR daily  Liver serology negative thus far  Repeat EGD in approx 6-9 months, pt to arrange at follow up visit   Supportive care  Continue to monitor       Note: This report was completed utilizing computer voice recognition software. Every effort has been made to ensure accuracy, however; inadvertent computerized transcription errors may be present.      Discussed with Dr. Kenzie Aragon per Dr. Shahnaz HAM-NP-C 1/25/2023  11:00 AM For Dr. Maya Avendano

## 2023-01-25 NOTE — PROGRESS NOTES
Physical Therapy  PT  orders received. Pt observed moving independently around the room. Pt in contact isolation. No skilled needs identified. Will discharge order. Pt instructed to let staff know if PT needs arise.

## 2023-01-25 NOTE — PROGRESS NOTES
A post-transfusion H&H lab specimen was collected at 11:50 AM by myself and sent to lab. At 12:09 PM an H&H result populated to Epic, but timed as if drawn at 04:50 AM. Lab dept was called by myself to clarify. The lab staff reexamined the specimen label and determined that the ink had smeared and the actual collection time of \"11:50\" was misinterpreted as \"4:50\" by lab staff. The currently documented (in the Epic electronic chart per the results review) specimen collection time of 04:50 AM is incorrect. The lab staff stated that they would attempt to get the collection/result time corrected.      Latest Reference Range & Units 1/25/23 04:50   Hemoglobin Quant 11.5 - 15.5 g/dL 8.8 (L)   Hematocrit 34.0 - 48.0 % 26.9 (L)   (L): Data is abnormally low

## 2023-01-25 NOTE — PROGRESS NOTES
PROGRESS NOTE        Patient Presents with/Seen in Consultation For      Reason for Consult: active GI bleed hemoglobin 6.6  CHIEF COMPLAINT:  hematemesis     Subjective:     Patient seen laying in bed, getting orthostatic BPs checked. Tolerating diet. Denies any abdominal pain at this time. Soft brown, blood streaked stool early today. Supposed to start a new medication per [facility] for her liver, uncertain of the name. POC reviewed with the patient and , all questions answered. Review of Systems  Aside from what was mentioned in the PMH and HPI, essentially unremarkable, all others negative. Objective:     BP (!) 116/56   Pulse 54   Temp 97.4 °F (36.3 °C) (Oral)   Resp 18   Ht 5' 10\" (1.778 m)   Wt 222 lb 9.6 oz (101 kg)   SpO2 98%   BMI 31.94 kg/m²     General appearance: alert, awake, laying in bed, and cooperative  Eyes: conjunctiva pale, sclera anicteric. PERRL.   Lungs: clear to auscultation bilaterally  Heart: regular rate and rhythm, no murmur, 2+ pulses; no edema  Abdomen: soft, non-tender; bowel sounds normal, rounded  Extremities: extremities without edema  Pulses: 2+ and symmetric  Skin: Skin color pale, texture, turgor normal.   Neurologic: Grossly normal    insulin lispro (HUMALOG) injection vial 0-16 Units, TID WC  insulin lispro (HUMALOG) injection vial 0-4 Units, Nightly  atorvastatin (LIPITOR) tablet 40 mg, Daily  levothyroxine (SYNTHROID) tablet 88 mcg, Daily  pantoprazole (PROTONIX) 40 mg in sodium chloride (PF) 0.9 % 10 mL injection, Q12H  dexamethasone (DECADRON) tablet 40 mg, Daily  nadolol (CORGARD) tablet 20 mg, Daily  sodium chloride flush 0.9 % injection 5-40 mL, 2 times per day  sodium chloride flush 0.9 % injection 5-40 mL, PRN  0.9 % sodium chloride infusion, PRN  ondansetron (ZOFRAN-ODT) disintegrating tablet 4 mg, Q8H PRN   Or  ondansetron (ZOFRAN) injection 4 mg, Q6H PRN  polyethylene glycol (GLYCOLAX) packet 17 g, Daily PRN  acetaminophen (TYLENOL) tablet 650 mg, Q6H PRN   Or  acetaminophen (TYLENOL) suppository 650 mg, Q6H PRN  albuterol (PROVENTIL) nebulizer solution 2.5 mg, Q2H PRN  cefTRIAXone (ROCEPHIN) 1,000 mg in sterile water 10 mL IV syringe, Q24H  glucose chewable tablet 16 g, PRN  dextrose bolus 10% 125 mL, PRN   Or  dextrose bolus 10% 250 mL, PRN  glucagon (rDNA) injection 1 mg, PRN  dextrose 10 % infusion, Continuous PRN       Data Review  CBC:   Lab Results   Component Value Date/Time    WBC 1.4 01/25/2023 03:55 AM    RBC 2.21 01/25/2023 03:55 AM    HGB 8.8 01/25/2023 04:50 AM    HCT 26.9 01/25/2023 04:50 AM    MCV 92.8 01/25/2023 03:55 AM    MCH 31.2 01/25/2023 03:55 AM    MCHC 33.7 01/25/2023 03:55 AM    RDW 14.5 01/25/2023 03:55 AM    PLT 30 01/25/2023 11:50 AM    MPV 11.4 01/25/2023 03:55 AM     CMP:    Lab Results   Component Value Date/Time     01/25/2023 03:55 AM    K 4.7 01/25/2023 03:55 AM     01/25/2023 03:55 AM    CO2 21 01/25/2023 03:55 AM    BUN 38 01/25/2023 03:55 AM    CREATININE 1.0 01/25/2023 03:55 AM    GFRAA >60 01/18/2022 03:15 PM    LABGLOM >60 01/25/2023 03:55 AM    GLUCOSE 322 01/25/2023 03:55 AM    PROT 5.2 01/25/2023 03:55 AM    LABALBU 3.4 01/25/2023 03:55 AM    CALCIUM 8.4 01/25/2023 03:55 AM    BILITOT 0.4 01/25/2023 03:55 AM    ALKPHOS 39 01/25/2023 03:55 AM    AST 17 01/25/2023 03:55 AM    ALT 21 01/25/2023 03:55 AM     Hepatic Function Panel:    Lab Results   Component Value Date/Time    ALKPHOS 39 01/25/2023 03:55 AM    ALT 21 01/25/2023 03:55 AM    AST 17 01/25/2023 03:55 AM    PROT 5.2 01/25/2023 03:55 AM    BILITOT 0.4 01/25/2023 03:55 AM    BILIDIR 0.2 01/22/2023 04:28 PM    IBILI 0.6 01/22/2023 04:28 PM    LABALBU 3.4 01/25/2023 03:55 AM     No components found for: CHLPL    Lab Results   Component Value Date    TRIG 100 12/01/2022       Lab Results   Component Value Date    HDL 32 01/23/2023    HDL 42 12/01/2022       Lab Results   Component Value Date    LDLCALC 47 01/23/2023    LDLCALC 40 12/01/2022       Lab Results   Component Value Date    LABVLDL 21 01/23/2023      PT/INR:    Lab Results   Component Value Date/Time    PROTIME 16.7 01/25/2023 03:55 AM    INR 1.5 01/25/2023 03:55 AM     IRON:    Lab Results   Component Value Date/Time    IRON 100 01/23/2023 11:30 AM     Iron Saturation:  No components found for: PERCENTFE  FERRITIN:    Lab Results   Component Value Date/Time    FERRITIN 110 01/23/2023 11:30 AM         Assessment:     Active Problems:  Hematemesis  Melena  Anemia, acute GI bleed  Cirrhosis, 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 Three bands were placed with hemostasis. Stomach showed markedly severe portal hypertensive gastropathy, changes with severe congestion. Duodenum unremarkable. No biopsies were done in view of the patient's severe thrombocytopenia. Plan:     Continue to monitor stools  Protonix 40 mg IV every 12  Nadolol 20 mg daily   GI soft diet   Dr. Syvlia Garrett hematology service at [facility] suggest oral Avatrombopag to improve thrombocytopenia. Doesn't appear available through inpatient pharmacy. Medicate for nausea as needed   Monitor CBC, CMP and INR daily  Liver serology negative   Repeat EGD in approx 6-9 months, will be scheduled at follow up visit.   Supportive care  Continue to monitor      Discussed with Dr. Maeve Segovia per Dr. Mayi Buitrago, APRN - CNP  1/26/2023  11:45 AM For Dr. Steve Cavanaugh

## 2023-01-25 NOTE — PROGRESS NOTES
P Quality Flow/Interdisciplinary Rounds Progress Note        Quality Flow Rounds held on January 25, 2023    Disciplines Attending:  Bedside Nurse, , , and Nursing Unit Leadership    Ruben Gil was admitted on 1/22/2023  3:43 PM    Anticipated Discharge Date:   tbd    Disposition: home    Pal Score:  Pal Scale Score: 20    Readmission Risk              Risk of Unplanned Readmission:  18           Discussed patient goal for the day, patient clinical progression, and barriers to discharge.   The following Goal(s) of the Day/Commitment(s) have been identified:   maintain hgb >7      Edel Santa RN  January 25, 2023

## 2023-01-25 NOTE — PROGRESS NOTES
Occupational Therapy      Occupational Therapy referral received  No acute OT needs at this time   OT order discontinued

## 2023-01-26 PROBLEM — E87.20 ACIDOSIS: Status: ACTIVE | Noted: 2023-01-26

## 2023-01-26 PROBLEM — K92.2 UPPER GI BLEED: Status: ACTIVE | Noted: 2023-01-26

## 2023-01-26 PROBLEM — D62 ANEMIA ASSOCIATED WITH ACUTE BLOOD LOSS: Status: ACTIVE | Noted: 2023-01-26

## 2023-01-26 PROBLEM — E11.65 UNCONTROLLED TYPE 2 DIABETES MELLITUS WITH HYPERGLYCEMIA (HCC): Status: ACTIVE | Noted: 2023-01-26

## 2023-01-26 LAB
ALBUMIN SERPL-MCNC: 3.5 G/DL (ref 3.5–5.2)
ALP BLD-CCNC: 39 U/L (ref 35–104)
ALT SERPL-CCNC: 24 U/L (ref 0–32)
ANION GAP SERPL CALCULATED.3IONS-SCNC: 10 MMOL/L (ref 7–16)
ANISOCYTOSIS: ABNORMAL
AST SERPL-CCNC: 21 U/L (ref 0–31)
BASOPHILS ABSOLUTE: 0 E9/L (ref 0–0.2)
BASOPHILS RELATIVE PERCENT: 0 % (ref 0–2)
BILIRUB SERPL-MCNC: 0.4 MG/DL (ref 0–1.2)
BUN BLDV-MCNC: 38 MG/DL (ref 6–23)
CALCIUM SERPL-MCNC: 8.3 MG/DL (ref 8.6–10.2)
CHLORIDE BLD-SCNC: 102 MMOL/L (ref 98–107)
CO2: 21 MMOL/L (ref 22–29)
CREAT SERPL-MCNC: 1.1 MG/DL (ref 0.5–1)
EOSINOPHILS ABSOLUTE: 0 E9/L (ref 0.05–0.5)
EOSINOPHILS RELATIVE PERCENT: 0 % (ref 0–6)
GFR SERPL CREATININE-BSD FRML MDRD: 56 ML/MIN/1.73
GLUCOSE BLD-MCNC: 341 MG/DL (ref 74–99)
HCT VFR BLD CALC: 22 % (ref 34–48)
HCT VFR BLD CALC: 22.1 % (ref 34–48)
HCT VFR BLD CALC: 25.5 % (ref 34–48)
HEMOGLOBIN: 7.3 G/DL (ref 11.5–15.5)
HEMOGLOBIN: 7.4 G/DL (ref 11.5–15.5)
HEMOGLOBIN: 8.4 G/DL (ref 11.5–15.5)
IGG 1: 241 MG/DL (ref 240–1118)
IGG 2: 242 MG/DL (ref 124–549)
IGG 3: 49 MG/DL (ref 21–134)
IGG 4: 16 MG/DL (ref 1–123)
INR BLD: 1.3
LYMPHOCYTES ABSOLUTE: 0.11 E9/L (ref 1.5–4)
LYMPHOCYTES RELATIVE PERCENT: 10.4 % (ref 20–42)
MAGNESIUM: 2.3 MG/DL (ref 1.6–2.6)
MCH RBC QN AUTO: 30.3 PG (ref 26–35)
MCHC RBC AUTO-ENTMCNC: 33 % (ref 32–34.5)
MCV RBC AUTO: 91.7 FL (ref 80–99.9)
METER GLUCOSE: 335 MG/DL (ref 74–99)
METER GLUCOSE: 344 MG/DL (ref 74–99)
METER GLUCOSE: 360 MG/DL (ref 74–99)
METER GLUCOSE: 408 MG/DL (ref 74–99)
METER GLUCOSE: 488 MG/DL (ref 74–99)
METER GLUCOSE: >500 MG/DL (ref 74–99)
MONOCYTES ABSOLUTE: 0.03 E9/L (ref 0.1–0.95)
MONOCYTES RELATIVE PERCENT: 2.8 % (ref 2–12)
MYELOCYTE PERCENT: 0.9 % (ref 0–0)
NEUTROPHILS ABSOLUTE: 0.96 E9/L (ref 1.8–7.3)
NEUTROPHILS RELATIVE PERCENT: 85.9 % (ref 43–80)
NUCLEATED RED BLOOD CELLS: 0.9 /100 WBC
OVALOCYTES: ABNORMAL
PDW BLD-RTO: 14.6 FL (ref 11.5–15)
PHOSPHORUS: 4.6 MG/DL (ref 2.5–4.5)
PLATELET # BLD: 27 E9/L (ref 130–450)
PLATELET # BLD: 30 E9/L (ref 130–450)
PLATELET CONFIRMATION: NORMAL
PLATELET CONFIRMATION: NORMAL
PMV BLD AUTO: 10.7 FL (ref 7–12)
POLYCHROMASIA: ABNORMAL
POTASSIUM SERPL-SCNC: 4.5 MMOL/L (ref 3.5–5)
PROTHROMBIN TIME: 15.2 SEC (ref 9.3–12.4)
RBC # BLD: 2.41 E12/L (ref 3.5–5.5)
SODIUM BLD-SCNC: 133 MMOL/L (ref 132–146)
TEAR DROP CELLS: ABNORMAL
TOTAL PROTEIN: 5.3 G/DL (ref 6.4–8.3)
WBC # BLD: 1.1 E9/L (ref 4.5–11.5)

## 2023-01-26 PROCEDURE — 85027 COMPLETE CBC AUTOMATED: CPT

## 2023-01-26 PROCEDURE — 6370000000 HC RX 637 (ALT 250 FOR IP): Performed by: INTERNAL MEDICINE

## 2023-01-26 PROCEDURE — A4216 STERILE WATER/SALINE, 10 ML: HCPCS | Performed by: INTERNAL MEDICINE

## 2023-01-26 PROCEDURE — 85025 COMPLETE CBC W/AUTO DIFF WBC: CPT

## 2023-01-26 PROCEDURE — 84100 ASSAY OF PHOSPHORUS: CPT

## 2023-01-26 PROCEDURE — 85014 HEMATOCRIT: CPT

## 2023-01-26 PROCEDURE — 2060000000 HC ICU INTERMEDIATE R&B

## 2023-01-26 PROCEDURE — 80053 COMPREHEN METABOLIC PANEL: CPT

## 2023-01-26 PROCEDURE — 36415 COLL VENOUS BLD VENIPUNCTURE: CPT

## 2023-01-26 PROCEDURE — 2580000003 HC RX 258: Performed by: INTERNAL MEDICINE

## 2023-01-26 PROCEDURE — 85018 HEMOGLOBIN: CPT

## 2023-01-26 PROCEDURE — 83735 ASSAY OF MAGNESIUM: CPT

## 2023-01-26 PROCEDURE — C9113 INJ PANTOPRAZOLE SODIUM, VIA: HCPCS | Performed by: INTERNAL MEDICINE

## 2023-01-26 PROCEDURE — 82962 GLUCOSE BLOOD TEST: CPT

## 2023-01-26 PROCEDURE — 6370000000 HC RX 637 (ALT 250 FOR IP): Performed by: PHYSICIAN ASSISTANT

## 2023-01-26 PROCEDURE — 6360000002 HC RX W HCPCS: Performed by: INTERNAL MEDICINE

## 2023-01-26 PROCEDURE — 99232 SBSQ HOSP IP/OBS MODERATE 35: CPT | Performed by: INTERNAL MEDICINE

## 2023-01-26 PROCEDURE — 85610 PROTHROMBIN TIME: CPT

## 2023-01-26 RX ORDER — INSULIN GLARGINE 100 [IU]/ML
10 INJECTION, SOLUTION SUBCUTANEOUS NIGHTLY
Status: DISCONTINUED | OUTPATIENT
Start: 2023-01-26 | End: 2023-01-27 | Stop reason: HOSPADM

## 2023-01-26 RX ADMIN — ATORVASTATIN CALCIUM 40 MG: 40 TABLET, FILM COATED ORAL at 20:37

## 2023-01-26 RX ADMIN — INSULIN LISPRO 16 UNITS: 100 INJECTION, SOLUTION INTRAVENOUS; SUBCUTANEOUS at 12:03

## 2023-01-26 RX ADMIN — SODIUM CHLORIDE, PRESERVATIVE FREE 10 ML: 5 INJECTION INTRAVENOUS at 20:38

## 2023-01-26 RX ADMIN — SODIUM CHLORIDE, PRESERVATIVE FREE 40 MG: 5 INJECTION INTRAVENOUS at 09:12

## 2023-01-26 RX ADMIN — LEVOTHYROXINE SODIUM 88 MCG: 0.09 TABLET ORAL at 05:26

## 2023-01-26 RX ADMIN — SODIUM CHLORIDE, PRESERVATIVE FREE 40 MG: 5 INJECTION INTRAVENOUS at 20:58

## 2023-01-26 RX ADMIN — INSULIN GLARGINE 10 UNITS: 100 INJECTION, SOLUTION SUBCUTANEOUS at 20:43

## 2023-01-26 RX ADMIN — INSULIN LISPRO 16 UNITS: 100 INJECTION, SOLUTION INTRAVENOUS; SUBCUTANEOUS at 17:12

## 2023-01-26 RX ADMIN — SODIUM CHLORIDE, PRESERVATIVE FREE 10 ML: 5 INJECTION INTRAVENOUS at 09:14

## 2023-01-26 RX ADMIN — WATER 1000 MG: 1 INJECTION INTRAMUSCULAR; INTRAVENOUS; SUBCUTANEOUS at 20:58

## 2023-01-26 RX ADMIN — DEXAMETHASONE 40 MG: 4 TABLET ORAL at 11:59

## 2023-01-26 RX ADMIN — INSULIN LISPRO 4 UNITS: 100 INJECTION, SOLUTION INTRAVENOUS; SUBCUTANEOUS at 20:43

## 2023-01-26 RX ADMIN — INSULIN LISPRO 12 UNITS: 100 INJECTION, SOLUTION INTRAVENOUS; SUBCUTANEOUS at 09:11

## 2023-01-26 ASSESSMENT — PAIN SCALES - GENERAL
PAINLEVEL_OUTOF10: 0
PAINLEVEL_OUTOF10: 0

## 2023-01-26 NOTE — FLOWSHEET NOTE
Ortho BP check       01/26/23 1151   Vital Signs   Blood Pressure Lying 116/49   Pulse Lying 58 PER MINUTE   Blood Pressure Sitting 129/50   Pulse Sitting 55 PER MINUTE   Blood Pressure Standing 127/53   Pulse Standing 54 PER MINUTE

## 2023-01-26 NOTE — PROGRESS NOTES
INPATIENT CARDIOLOGY FOLLOW-UP    Name: Cheryl Love    Age: 57 y.o. Date of Admission: 1/22/2023  3:43 PM    Date of Service: 1/26/2023    Chief Complaint: Follow-up for sinus bradycardia, gastrointestinal hemorrhage, nonalcoholic cirrhosis with associated portal hypertension, esophageal varices, idiopathic thrombocytopenic purpura, anemia, thrombocytopenia, moderate obesity    Interim History: The patient remains compensated from a cardiovascular standpoint with persistent sinus bradycardia and no additional arrhythmias. Most recent laboratory assessment demonstrates a progressive anemia and persistent severe thrombocytopenia. Review of Systems: The remainder of a complete multisystem review including consitutional, central nervous, respiratory, circulatory, gastrointestinal, genitourinary, endocrinologic, hematologic, musculoskeletal and psychiatric are negative. Problem List:  Patient Active Problem List   Diagnosis    Gastrointestinal hemorrhage    Esophageal bleed, non-variceal    Thrombocythemia    Thrombocytopenia (HCC)    Acute blood loss anemia    Cirrhosis of liver without ascites (HCC)    History of ITP    Anemia    Sinus bradycardia    Bleeding esophageal varices (HCC)    Portal hypertension (HCC)    Cerebrovascular accident (CVA) ([address]    Stenosis of right carotid artery    Primary hypertension    Type 2 diabetes mellitus without complication, without long-term current use of insulin ([address]    Pure hypercholesterolemia    Moderate obesity       Allergies: Allergies   Allergen Reactions    Shellfish-Derived Products Hives    Bacitracin Itching    Codeine      Other reaction(s):  Other: See Comments, Unknown  Tingling arms, heart palpitations      Penicillins      Other reaction(s): Unknown  In childhood      Azithromycin Rash    Erythromycin Rash    Metoprolol Rash       Current Medications:  Current Facility-Administered Medications   Medication Dose Route Frequency Provider Last Rate Last Admin    0.9 % sodium chloride infusion   IntraVENous PRN Stacie Gunter MD        insulin lispro (HUMALOG) injection vial 0-16 Units  0-16 Units SubCUTAneous TID  Sebastien Valdez, DO   12 Units at 01/25/23 1709    insulin lispro (HUMALOG) injection vial 0-4 Units  0-4 Units SubCUTAneous Nightly Micah Neelyseranmol Verason, DO   4 Units at 01/25/23 2112    atorvastatin (LIPITOR) tablet 40 mg  40 mg Oral Daily Micah Dessert Toolson, DO   40 mg at 01/25/23 2103    levothyroxine (SYNTHROID) tablet 88 mcg  88 mcg Oral Daily Micah Dessert Toolson, DO   88 mcg at 01/26/23 0526    pantoprazole (PROTONIX) 40 mg in sodium chloride (PF) 0.9 % 10 mL injection  40 mg IntraVENous Q12H Micah Neelysert Toolson, DO   40 mg at 01/25/23 2103    dexamethasone (DECADRON) tablet 40 mg  40 mg Oral Daily Micah Neelyseranmol Ritoon, DO   40 mg at 01/25/23 7261    nadolol (CORGARD) tablet 20 mg  20 mg Oral Daily Micah Dessert Toolson, DO   20 mg at 01/23/23 1819    sodium chloride flush 0.9 % injection 5-40 mL  5-40 mL IntraVENous 2 times per day Sebastien Valdez, DO   10 mL at 01/25/23 2104    sodium chloride flush 0.9 % injection 5-40 mL  5-40 mL IntraVENous PRN Micah Neelysert Toolson, DO        0.9 % sodium chloride infusion   IntraVENous PRN Micah Neelyseranmol Verason, DO        ondansetron (ZOFRAN-ODT) disintegrating tablet 4 mg  4 mg Oral Q8H PRN Micah Neelysert Toolson, DO        Or    ondansetron (ZOFRAN) injection 4 mg  4 mg IntraVENous Q6H PRN Micah Neelysert Toolson, DO   4 mg at 01/23/23 1022    polyethylene glycol (GLYCOLAX) packet 17 g  17 g Oral Daily PRN Micah Neelysert Toolson, DO        acetaminophen (TYLENOL) tablet 650 mg  650 mg Oral Q6H PRN Sebastien Valdez DO        Or    acetaminophen (TYLENOL) suppository 650 mg  650 mg Rectal Q6H PRN Micah Perdue DO        albuterol (PROVENTIL) nebulizer solution 2.5 mg  2.5 mg Nebulization Q2H PRN Micah Perdue DO        cefTRIAXone (ROCEPHIN) 1,000 mg in sterile water 10 mL IV syringe  1,000 mg IntraVENous Q24H Micah Perdue DO   1,000 mg at 01/25/23 2106 glucose chewable tablet 16 g  4 tablet Oral PRN Julio G Toolson, DO        dextrose bolus 10% 125 mL  125 mL IntraVENous PRN [place] Sammi Toolson, DO        Or    dextrose bolus 10% 250 mL  250 mL IntraVENous PRN Lucy Sammi Toolson, DO        glucagon (rDNA) injection 1 mg  1 mg SubCUTAneous PRN Julio G Toolson, DO        dextrose 10 % infusion   IntraVENous Continuous PRN Julio G Toolson, DO          sodium chloride      sodium chloride      dextrose         Physical Exam:  /60   Pulse 52   Temp 97.6 °F (36.4 °C) (Oral)   Resp 18   Ht 5' 10\" (1.778 m)   Wt 222 lb 9.6 oz (101 kg)   SpO2 96%   BMI 31.94 kg/m²   Weight change: 0 lb (0 kg)  Wt Readings from Last 3 Encounters:   01/26/23 222 lb 9.6 oz (101 kg)   11/09/22 221 lb (100.2 kg)   07/06/22 219 lb (99.3 kg)     The patient is awake, alert and in no discomfort or distress. No gross musculoskeletal deformity is present. No significant skin or nail changes are present. Gross examination of head, eyes, nose and throat are negative. Jugular venous pressure is normal and no carotid bruits are present. Normal respiratory effort is noted with no accessory muscle usage present. Lung fields are clear to ascultation. Cardiac examination is notable for a regular rate and rhythm with no palpable thrill. No gallop rhythm or cardiac murmur are identified. A benign abdominal examination is present with no masses or organomegaly. Intact pulses are present throughout all extremities and no peripheral edema is present. No focal neurologic deficits are present. Intake/Output:    Intake/Output Summary (Last 24 hours) at 1/26/2023 0714  Last data filed at 1/26/2023 0031  Gross per 24 hour   Intake 985.97 ml   Output 300 ml   Net 685.97 ml     No intake/output data recorded.     Laboratory Tests:  Lab Results   Component Value Date    CREATININE 1.0 01/25/2023    BUN 38 (H) 01/25/2023     01/25/2023    K 4.7 01/25/2023     01/25/2023    CO2 21 (L) 01/25/2023     No results for input(s): CKTOTAL, CKMB in the last 72 hours. Invalid input(s): TROPONONI  No results found for: BNP  Lab Results   Component Value Date/Time    WBC 1.1 01/26/2023 05:30 AM    RBC 2.41 01/26/2023 05:30 AM    HGB 7.3 01/26/2023 05:30 AM    HCT 22.1 01/26/2023 05:30 AM    MCV 91.7 01/26/2023 05:30 AM    MCH 30.3 01/26/2023 05:30 AM    MCHC 33.0 01/26/2023 05:30 AM    RDW 14.6 01/26/2023 05:30 AM    PLT 30 01/26/2023 05:30 AM    MPV 10.7 01/26/2023 05:30 AM     Recent Labs     01/24/23  0430 01/25/23  0355   ALKPHOS 40 39   ALT 19 21   AST 17 17   PROT 5.1* 5.2*   BILITOT 0.5 0.4   LABALBU 3.3* 3.4*     Lab Results   Component Value Date/Time    MG 2.2 01/25/2023 03:55 AM     Lab Results   Component Value Date/Time    PROTIME 16.7 01/25/2023 03:55 AM    INR 1.5 01/25/2023 03:55 AM     Lab Results   Component Value Date/Time    TSH 0.711 01/23/2023 11:30 AM     No components found for: CHLPL  Lab Results   Component Value Date    TRIG 100 12/01/2022     Lab Results   Component Value Date    HDL 32 01/23/2023    HDL 42 12/01/2022     Lab Results   Component Value Date    LDLCALC 47 01/23/2023    [address] 40 12/01/2022       Cardiac Tests:  Telemetry findings reviewed: sinus bradycardia, no new tachy/bradyarrhythmias overnight      ASSESSMENT / PLAN: On a clinical basis, the patient present remains compensated from a cardiovascular standpoint with the predominant rhythm being that of sinus bradycardia with occasional periods of sinus rhythm and no additional evidence of conduction abnormalities. With limited ambulation she remains asymptomatic with no additional needs of assessment or management and no indications for consideration of device intervention. A persistent anemia with reduction of hemoglobin levels and persistent severe thrombocytopenia are noted with further management deferred to primary care and the combination of the gastroenterology and hematology services.   Be continued with holding of a beta-blocker and management of her portal hypertension will be advisable. Ongoing appropriate lifestyle modification to achieve weight reduction will benefit diastolic cardiac performance as well as reducing risk of the development of obstructive sleep apnea with associated adverse cardiovascular effects and consideration of of a formal sleep assessment and initiation of nocturnal CPAP as appropriate to reduce risk of adverse cardiovascular effects. Ongoing aggressive risk factor modification blood pressure, diabetes and serum lipids will remain essential risk for future atherosclerotic development. Additional management during hospitalization will be deferred to primary care and predominantly the gastroenterology and hematology services will further evaluate her should additional cardiovascular difficulties or concerns arise. Note: This report was completed utilizing computer voice recognition software. Every effort has been made to ensure accuracy, however; inadvertent computerized transcription errors may be present. [employer].  Gareth Knott, [address] [facility]

## 2023-01-26 NOTE — PROGRESS NOTES
Spoke with Dr. Shavonne Ashley regarding his note about continuing nadolol vs continue to hold per parameters. States that pulmonary is managing this medication.

## 2023-01-26 NOTE — CARE COORDINATION
ICU tx on 1/24. H/H  7.3/22.1 today, awaiting further plans. Remains on IV rocephin and IV protonix. Plan remains for pt to return home, no needs. Will follow.   Carlota Cobb, SENGN, RN  [address] [place] [address] Case Management  [phone]

## 2023-01-26 NOTE — PROGRESS NOTES
[facility] Progress Note    Admitting Date and Time: 1/22/2023  3:43 PM  Admit Dx: Upper GI bleed [K92.2]  Esophageal bleed, non-variceal [K22.89]    Subjective:  Patient is being followed for Upper GI bleed [K92.2]  Esophageal bleed, non-variceal [K22.89]     Patient was sitting on edge of bed in no acute distress. She denies any dizziness, or chest pain. States she has not had any further nausea or vomiting. ROS: denies fever, chills, cp, sob, n/v, HA unless stated above.       insulin lispro  0-16 Units SubCUTAneous TID WC    insulin lispro  0-4 Units SubCUTAneous Nightly    atorvastatin  40 mg Oral Daily    levothyroxine  88 mcg Oral Daily    pantoprazole (PROTONIX) 40 mg injection  40 mg IntraVENous Q12H    dexamethasone  40 mg Oral Daily    nadolol  20 mg Oral Daily    sodium chloride flush  5-40 mL IntraVENous 2 times per day    cefTRIAXone (ROCEPHIN) IV  1,000 mg IntraVENous Q24H     sodium chloride, , PRN  sodium chloride flush, 5-40 mL, PRN  sodium chloride, , PRN  ondansetron, 4 mg, Q8H PRN   Or  ondansetron, 4 mg, Q6H PRN  polyethylene glycol, 17 g, Daily PRN  acetaminophen, 650 mg, Q6H PRN   Or  acetaminophen, 650 mg, Q6H PRN  albuterol, 2.5 mg, Q2H PRN  glucose, 4 tablet, PRN  dextrose bolus, 125 mL, PRN   Or  dextrose bolus, 250 mL, PRN  glucagon (rDNA), 1 mg, PRN  dextrose, , Continuous PRN         Objective:    /65   Pulse (!) 49   Temp 97.7 °F (36.5 °C) (Oral)   Resp 18   Ht 5' 10\" (1.778 m)   Wt 222 lb 9.6 oz (101 kg)   SpO2 96%   BMI 31.94 kg/m²   General Appearance: alert and oriented to person, place and time and in no acute distress  Skin: warm and dry  Head: normocephalic and atraumatic  Eyes: pupils equal, round, and reactive to light, extraocular eye movements intact, conjunctivae normal  Neck: neck supple and non tender without mass   Pulmonary/Chest: clear to auscultation bilaterally- no wheezes, rales or rhonchi, normal air movement, no respiratory distress  Cardiovascular: normal rate, normal S1 and S2 and no carotid bruits  Abdomen: soft, non-tender, non-distended, normal bowel sounds, no masses or organomegaly  Extremities: no cyanosis, no clubbing and no edema  Neurologic: no cranial nerve deficit and speech normal        Recent Labs     01/24/23  0430 01/25/23  0355 01/26/23  0530    135 133   K 4.4 4.7 4.5   * 105 102   CO2 20* 21* 21*   BUN 36* 38* 38*   CREATININE 0.9 1.0 1.1*   GLUCOSE 287* 322* 341*   CALCIUM 8.2* 8.4* 8.3*       Recent Labs     01/24/23  0940 01/24/23  1650 01/25/23  0355 01/25/23  0450 01/25/23  1150 01/26/23  0035 01/26/23  0530   WBC 2.2*  --  1.4*  --   --   --  1.1*   RBC 2.59*  --  2.21*  --   --   --  2.41*   HGB 8.0*   < > 6.9* 8.8*  --  7.4* 7.3*   HCT 24.0*   < > 20.5* 26.9*  --  22.0* 22.1*   MCV 92.7  --  92.8  --   --   --  91.7   MCH 30.9  --  31.2  --   --   --  30.3   MCHC 33.3  --  33.7  --   --   --  33.0   RDW 14.8  --  14.5  --   --   --  14.6   PLT 32*  --  29*  --  30* 27* 30*   MPV 12.0  --  11.4  --   --   --  10.7    < > = values in this interval not displayed. Radiology: reviewed     Assessment:    Principal Problem:    Gastrointestinal hemorrhage  Active Problems:    Esophageal bleed, non-variceal    Thrombocythemia    Thrombocytopenia (HCC)    Acute blood loss anemia    Cirrhosis of liver without ascites (HCC)    History of ITP    Anemia    Sinus bradycardia    Bleeding esophageal varices (HCC)    Portal hypertension (HCC)    C[address] accident ([address]) [address]    Stenosis of right carotid artery    Primary hypertension    Type 2 diabetes mellitus without complication, without long-term current use of insulin ([address]    Pure hypercholesterolemia    Moderate obesity  Resolved Problems:    * No resolved hospital problems. *      Plan:  1. Upper GI bleed: Presented to the ED for hematemesis and melena. GI consulted. EGD on 1/23/23 showing esophageal varices with stigmata of recent bleed.  3 bands placed with hemostasis. Continue GI soft diet. Continue IV Protonix BID. On Nadolol daily. Repeat EGD in 6-9 months. 2.  Acute blood loss anemia: Likely secondary to recent variceal bleed. Was in ICU from 1/22-1/24. hemoglobin 6.6 on presentation, and dropped to 6.9 on 1/25. S/p 3 units PRBCs. Monitor H&H. Transfuse prn for hgb <7.      3.  Acute complicated UTI: Urine culture positive for E. Coli. Continue IV ceftriaxone 1000 mg every 24 hours. 4.  Pancytopenia:  Hx of ITP. S/p  3 units of platelets. Hem/onc consulted. 5. ITP: S/p 3 U of platelets with minimal improvement. Plts 30k today. Continue IV dexamethasone 40 mg for 4 days. Was scheduled to start Promacta at [facility] but was never started. Hem/onc following. 6. DMII:  Hemoglobin 11/9/22 on 7.0. High dose ssi. Hypoglycemic protocol. Sugars elevated secondary to decadron. 7. Cryptogenic cirrhosis with known esophageal varices: Confirmed on EGD 1/23.    8.  History of stroke: Hold Plavix. Continue statin    9. 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 Hypothyroidism: Continue Synthroid    11. Hypertension: Hold irbesartan    12. Obesity class I: Follow-up with PCP for diet and lifestyle modifications. 13. Asymptomatic bradycardia: Patient bradycardic even with nadolol held. Cardiology consulted. Recommended holding BB. NOTE: This report was transcribed using voice recognition software. Every effort was made to ensure accuracy; however, inadvertent computerized transcription errors may be present. Electronically signed by Adriana Avalos PA-C on 1/26/2023 at 9:59 AM     25 minutes time spent reviewing patient chart, assessing patient, discussing plan of care with patient and family, discussing plan of care with collaborating physician, and charting.     HOSPITALIST ATTENDING PHYSICIAN NOTE 1/26/2023 1740PM:    Details of the evaluation - subjective assessment (including medication profile, past medical, family and social history when applicable), examination, review of lab and test data, diagnostic impressions and medical decision making - performed by Bryon Bonilla PA-C, were discussed with me on the date of service and I agree with clinical information herein unless otherwise noted. The patient has been evaluated by me personally earlier today. Pt reports no fevers, chills,n/v.     Exam: heart reg at rate of 48, lungs cta, abd pos bs soft nt, ext neg for le edema    I agree with the assessment and plan of Jeniffer Perez PA-C    26 min spent by me reviewing records, interviewing/examining pt, interpreting data, discussion with nursing, formulating tx plan as noted in NP's note    Upper gi bleed  Anemia with acute blood loss  acidosis  Complicated uti  Pancytopenia  ITP  Dm type 2 uncontrolled  Cryptogenic cirrhosis  Hx of cva  Hypothyroidism  bradycardia      Electronically signed by Mckenzie Pfeiffer D.O.   Hospitalist  [facility] Hospitalist Service at [facility]

## 2023-01-26 NOTE — PROGRESS NOTES
[facility]  Hematology/Oncology  Consult      Patient Name: Esme Tovar  YOB: 1960  PCP: Roddy Avila MD   Referring Provider:      Reason for Consultation:   Chief Complaint   Patient presents with    Emesis     Throwing up bright red blood this AM    Melena    Abdominal Pain      Interval history: Feels much better today post transfusion. No more black tarry stools. 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 She had marrow evaluation in 2015 and 2017 and peripheral blood for myeloid NGS in 2018. Since that time worsening cytopenias. She then had a another marrow in November 2022 which showed normocellular marrow without acute abnormalities, increased megakaryocytes noted. PET scan was largely unrevealing although did show cirrhotic liver morphology with subsequent significant splenomegaly at 24 cm. Given relatively bland marrow she was started on treatment for presumed ITP with Doptelet 20mg daily. 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 Patient presented to the ED for evaluation of hematemesis and black stool ongoing for 24 hours. CT abd revealed cirrhosis and portal hypertension with splenomegaly and esophageal varices. CBC revealed hgb 6.6 patient s/p 1 unit of pRBC's. 2 more ordered. Positive UA on Rocephin. GI and General surgery following patient for EGD with possible banding today. ICU for further care. AFP, ROSETTA, CEA, iron profile, IG's, hepatitis panel pending. CMP with BUN 36, Mg 1.3. LFT's unremarkable. CBC with WBC 2.3, hgb 7.8 s/p 1 unit of pRBC's yesterday, platelets 24. Consultation for pancytopenia presumed ITP. Review of systems: Over 10 systems were reviewed and all were negative except as mentioned above. Diagnostic Data:     Past Medical History:   Diagnosis Date    Cerebral artery occlusion with cerebral infarction ([facility] [address]     Diabetes mellitus ([facility] [address]        Patient Active Problem List    Diagnosis Date Noted    Sinus bradycardia 01/25/2023    Bleeding esophageal varices ([facility] [address] 01/25/2023    Portal hypertension ([facility] [address] 01/25/2023    Cerebrovascular accident (CVA) ([facility] [address] 01/25/2023    Stenosis of right carotid artery 01/25/2023    Primary hypertension 01/25/2023    Type 2 diabetes mellitus without complication, without long-term current use of insulin ([facility] [address] 01/25/2023    Pure hypercholesterolemia 01/25/2023    Moderate obesity 01/25/2023    Thrombocythemia 01/23/2023    Thrombocytopenia ([facility] [address] 01/23/2023    Acute blood loss anemia 01/23/2023    Cirrhosis of liver without ascites ([facility] [address] 01/23/2023    History of ITP 01/23/2023    Anemia 01/23/2023    Gastrointestinal hemorrhage 01/22/2023    Esophageal bleed, non-variceal 01/22/2023        Past Surgical History:   Procedure Laterality Date    CHOLECYSTECTOMY      UPPER GASTROINTESTINAL ENDOSCOPY  1/23/2023    EGD BAND LIGATION performed by Richa Marcso MD at [facility] ENDOSCOPY       Family History  History reviewed. No pertinent family history. Social History    TOBACCO:   reports that she has never smoked. She has never used smokeless tobacco.  ETOH:   reports no history of alcohol use. Home Medications  Prior to Admission medications    Medication Sig Start Date End Date Taking?  Authorizing Provider   levothyroxine (SYNTHROID) 88 MCG tablet Take 1 tablet Monday through Saturday, 1.5 tablets on Sunday 12/12/22   FATOUMATA Barriga - CNS   Dulaglutide (TRULICITY) 1.5 PH/8.8IE SOPN Inject 1.5 mg into the skin once a week 7/6/22   FATOUMATA Barriga - CNS   atorvastatin (LIPITOR) 40 MG tablet Take 40 mg by mouth daily    Historical Provider, MD   clopidogrel (PLAVIX) 75 MG tablet  1/10/22   Historical Provider, MD   ferrous sulfate (IRON 325) 325 (65 Fe) MG tablet Take 325 mg by mouth daily (with breakfast) 3/7/22 6/5/22  Historical Provider, MD   irbesartan (AVAPRO) 150 MG tablet  2/20/22   Historical Provider, MD   metFORMIN (GLUCOPHAGE) 1000 MG tablet Take 1 tablet by mouth 2 times daily (with meals) 4/6/22   FATOUMATA Trotter - CNS   TRESIBA FLEXTOUCH 100 UNIT/ML SOPN Inject 48 units subcutaneous  at night 4/6/22   FATOUMATA Trotter - CNS       Allergies  Allergies   Allergen Reactions    Shellfish-Derived Products Hives    Bacitracin Itching    Codeine      Other reaction(s): Other: See Comments, Unknown  Tingling arms, heart palpitations      Penicillins      Other reaction(s): Unknown  In childhood      Azithromycin Rash    Erythromycin Rash    Metoprolol Rash         Objective  /65   Pulse (!) 49   Temp 97.7 °F (36.5 °C) (Oral)   Resp 18   Ht 5' 10\" (1.778 m)   Wt 222 lb 9.6 oz (101 kg)   SpO2 96%   BMI 31.94 kg/m²     Physical Exam:   Performance Status:  General: AAO to person, place, time, in no acute distress,   Head and neck : PERRLA, EOMI . Sclera non icteric. Oropharynx : Clear  Neck: no JVD,  no adenopathy  Heart: Regular rate and regular rhythm, no murmur  Lungs: Clear to auscultation   Extremities: No edema,no cyanosis, no clubbing. Abdomen: Soft, non-tender;no masses, no organomegaly  Skin:  No rash. Neurologic:Cranial nerves grossly intact. No focal motor or sensory deficits .     Recent Laboratory Data-   Lab Results   Component Value Date    WBC 1.1 (L) 01/26/2023    HGB 7.3 (L) 01/26/2023    HCT 22.1 (L) 01/26/2023    MCV 91.7 01/26/2023    PLT 30 (L) 01/26/2023    LYMPHOPCT 10.4 (L) 01/26/2023    RBC 2.41 (L) 01/26/2023    MCH 30.3 01/26/2023    MCHC 33.0 01/26/2023    RDW 14.6 01/26/2023    NEUTOPHILPCT 85.9 (H) 01/26/2023    MONOPCT 2.8 01/26/2023    BASOPCT 0.0 01/26/2023    NEUTROABS 0.96 (L) 01/26/2023    LYMPHSABS 0.11 (L) 01/26/2023    MONOSABS 0.03 (L) 01/26/2023    EOSABS 0.00 (L) 01/26/2023    BASOSABS 0.00 01/26/2023       Lab Results   Component Value Date     01/26/2023    K 4.5 01/26/2023     01/26/2023    CO2 21 (L) 01/26/2023    BUN 38 (H) 01/26/2023    CREATININE 1.1 (H) 01/26/2023    GLUCOSE 341 (H) 01/26/2023    CALCIUM 8.3 (L) 01/26/2023    PROT 5.3 (L) 01/26/2023    LABALBU 3.5 01/26/2023    BILITOT 0.4 01/26/2023    ALKPHOS 39 01/26/2023    AST 21 01/26/2023    ALT 24 01/26/2023    LABGLOM 56 01/26/2023    GFRAA >60 01/18/2022       Lab Results   Component Value Date    IRON 100 01/23/2023    TIBC 272 01/23/2023    FERRITIN 110 01/23/2023           Radiology-    CT ABDOMEN PELVIS W IV CONTRAST Additional Contrast? None    Result Date: 1/22/2023  EXAMINATION: CT OF THE ABDOMEN AND PELVIS WITH CONTRAST 1/22/2023 3:34 pm TECHNIQUE: CT of the abdomen and pelvis was performed with the administration of intravenous contrast. Multiplanar reformatted images are provided for review. Automated exposure control, iterative reconstruction, and/or weight based adjustment of the mA/kV was utilized to reduce the radiation dose to as low as reasonably achievable. COMPARISON: None. HISTORY: ORDERING SYSTEM PROVIDED HISTORY: abd pain, rectal bleeding TECHNOLOGIST PROVIDED HISTORY: Reason for exam:->abd pain, rectal bleeding Additional Contrast?->None Decision Support Exception - unselect if not a suspected or confirmed emergency medical condition->Emergency Medical Condition (MA) FINDINGS: Lower Chest:  Visualized portion of the lower chest demonstrates no acute abnormality. Distal esophageal varices are noted. Organs: The liver demonstrates a cirrhotic morphology with hypertrophy of the caudate lobe and left lateral segment as well as a nodular surface. A recanalized paraumbilical vein is seen. The portal vein is patent. Status post cholecystectomy. No biliary ductal dilatation seen.   Spleen is significantly enlarged measuring 21.0 cm. Prominent perisplenic collateral vessels are noted. The pancreas and adrenal glands are unremarkable. The kidneys enhance symmetrically without evidence of hydronephrosis. GI/Bowel: Stomach and duodenal sweep demonstrate no acute abnormality. There is no evidence of bowel obstruction. No evidence of abnormal bowel wall thickening or distension. Mild edematous wall thickening of the ascending colon is noted, likely related to portal hypertension. The appendix is visualized and is unremarkable. No evidence of acute appendicitis. There is diverticulosis without evidence of diverticulitis. Pelvis:  Bladder is unremarkable in appearance. The uterus and adnexa are without acute abnormality. Peritoneum/Retroperitoneum: A small volume of ascites is seen, predominantly in the perihepatic and perisplenic locations. No free air is noted. No evidence of lymphadenopathy. Aorta is normal in caliber. Bones/Soft Tissues:  No acute abnormality of the visualized osseous structures. Cirrhosis with portal hypertension manifesting as splenomegaly, ascending colon wall thickening, small volume of ascites and collateral vessels including esophageal varices. Diverticulosis without evidence of acute diverticulitis. XR CHEST PORTABLE    Result Date: 1/23/2023  EXAMINATION: ONE XRAY VIEW OF THE CHEST 1/23/2023 5:52 am COMPARISON: None. HISTORY: ORDERING SYSTEM PROVIDED HISTORY: SOB TECHNOLOGIST PROVIDED HISTORY: Reason for exam:->SOB FINDINGS: The lungs are without acute focal process. There is no effusion or pneumothorax. The cardiomediastinal silhouette is without acute process. The osseous structures are without acute process. No acute process. 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 Workup as above.  Started on treatment for presumed ITP with Doptelet 20mg daily. 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 - pancytopenia presumed ITP    - Hematemesis and black stool ongoing for 24 hours. - CT abd revealed cirrhosis and portal hypertension with splenomegaly and esophageal varices. - CBC revealed hgb 6.6 patient s/p 1 unit of pRBC's. 2 more ordered. - Positive UA on Rocephin.   - GI and General surgery following patient for EGD with possible banding today. - ICU for further care. - AFP, ROSETTA, CEA, iron profile, IG's, hepatitis panel pending. CMP with BUN 36, Mg 1.3. LFT's unremarkable. - CBC with WBC 2.3, hgb 7.8 s/p 1 unit of pRBC's yesterday, platelets 24. Attending addendum: Thrombocytopenia most likely seems to be due to underlying cirrhosis and hypersplenism with superimposed consumption due to variceal bleed. Patient however has diagnosis of ITP made in the past from [facility] clinic. S/p 2 units of platelet transfusion without any increment in platelet count. Will do dexamethasone 40 mg daily for 4 doses for ITP. Was scheduled to start Promacta at [facility] but has not started yet. Upper GI bleed: EGD planned today. Hemoglobin 7.6. Transfuse platelets to keep above 50,000. Will continue to follow. 1/24/23  - Thrombocytopenia as above. - S/p 2 units of platelet transfusion without any increment in platelet count. - Continue dexamethasone 40 mg daily for 4 doses for ITP. Was scheduled to start Promacta at [facility] but has not started yet. - Upper GI bleed: EGD with Grade 3/4 esophageal varices with stigmata of recent bleed. Three bands were placed with hemostasis. Stomach showed markedly severe portal hypertensive gastropathy, changes with severe congestion. Duodenum unremarkable. No biopsies were done in view of the patient's severe thrombocytopenia.   - Hemoglobin 8.0  Transfuse platelets to keep above 50,000. Platelets 32.   Will continue to follow. 1/25/23  - Thrombocytopenia as above. S/p 2 units of platelet without any increment in platelet count  - Continue dexamethasone 40 mg daily for 4 doses for ITP. Was scheduled to start Promacta at [facility] but has not started yet. - Upper GI bleed: EGD with Grade 3/4 esophageal varices with stigmata of recent bleed. Three bands were placed with hemostasis. Stomach showed markedly severe portal hypertensive gastropathy, changes with severe congestion. Duodenum unremarkable. No biopsies were done in view of the patient's severe thrombocytopenia.   - Hemoglobin 6.9. 1 unit of pRBC's ordered. Post hgb 8.8. Platelets remain low but stable 30. Giving and additional dose  - Cardiology consulted for bradycardia. - Will continue to follow    1/26/23  - Thrombocytopenia as above. - S/p dexamethasone 40 mg daily for 4 doses for ITP. Was scheduled to start Promacta at [facility] but has not started yet. - Upper GI bleed: EGD with Grade 3/4 esophageal varices with stigmata of recent bleed. Post banding. Stomach showed markedly severe portal hypertensive gastropathy, changes with severe congestion. No biopsies were done in view of the patient's severe thrombocytopenia.   - S/p 1 unit of pRBC's yesterday. Post transfusion hgb 8.8. Now 7.3 today. Platelets remain low but stable 30. Additional unit of platelets ordered yesterday. - Cardiology consulted for bradycardia. Chronic leukopenia with slightly decreased but  adequate ANC related to peripheral sequestration by hypersplenism  - Will continue to follow      FATOUMATA Cervantes - CNP  Electronically signed 1/26/2023 at 11:25 AM  Patient seen and examined. Agree with CNP's assessment plan. Note updated.     Guardado MD

## 2023-01-26 NOTE — ADT AUTH CERT
Utilization Reviews       Gastrointestinal Bleeding, Upper - Care Day 4 (1/25/2023) by Christy Saul       Review Status Review Entered   Completed 1/26/2023 1046       Created By   Christy Saul      Criteria Review      Care Day: 4 Care Date: 1/25/2023 Level of Care: Intermediate Care    Guideline Day 2    Level Of Care    ( ) Floor    1/26/2023 10:46 AM EST by Tammie Falk      intermediate    Clinical Status    (X) * Hypotension absent    1/26/2023 10:46 AM EST by Osvaldo Shell      BP: 103/56 105/47 106/50    ( ) * Bleeding absent or reduced    1/26/2023 10:46 AM EST by Tammie Falk      Hemoglobin Quant: 8.8 (L)  Hematocrit: 26.9 (L)    Activity    (X) Activity as tolerated    1/26/2023 10:46 AM EST by Tammie Falk      Up as tolerated    Routes    (X) * Oral hydration tolerated    1/26/2023 10:46 AM EST by Osvaldo Moore      po hydration    (X) * Oral diet tolerated    1/26/2023 10:46 AM EST by Tammie Falk      ADULT DIET; Regular; GI [place] (GERD/Peptic Ulcer)    Interventions    (X) * NG tube absent    (X) Hgb/Hct    1/26/2023 10:46 AM EST by Tammie Falk      Hemoglobin Quant: 6.9 (LL)  8.8 (L)  Hematocrit: 20.5 (L) 26.9 (L)    Medications    (X) Proton pump inhibitor    1/26/2023 10:46 AM EST by Osvaldo Moore      PROTONIX 40 mg q12H IV    (X) Antibiotic prophylaxis in cirrhotic patient    1/26/2023 10:46 AM EST by Osvaldo Shell      ROCEPHIN 1,000 mg q24H IV       Definitions for Care Day 4    Hypotension absent    (X) Hypotension absent, as indicated by  1 or more  of the following  (1) (2) (3) (4):       (X) SBP greater than or equal to 90 mm Hg and without recent decrease greater than 40 mm Hg from       baseline in adult or child 10 years or older       * Milestone   Additional Notes   DATE: 01/25          PERTINENT UPDATES:   Patient had 1 unit RBC this am. Platelets remain low but stable 30. Tolerating diet. Still on IV Rocephin and Protonix.  Patient had periods of bradycardia, HR: 46 48. VITALS:   T: 98.3    RR: 20    SpO2: 94%RA          ABNL/PERTINENT LABS/RADIOLOGY/DIAGNOSTIC STUDIES:   Meter Glucose: 470 (H) 338 (H)   Platelet Count: 30 (L)   CO2: 21 (L)   BUN,BUNPL: 38 (H)   Glucose, Random: 322 (H)   CALCIUM, SERUM, [id number]: 8.4 (L)   Total Protein: 5.2 (L)   Albumin: 3.4 (L)   WBC: 1.4 (L)   RBC: 2.21 (L)   Platelet Count: 29 (L)   Meter Glucose: 354 (H)         PHYSICAL EXAM:   Heart: regular rate and rhythm, no murmur, 2+ pulses; no edema   Abdomen: soft, non-tender; bowel sounds normal; no masses,  no organomegaly         MD CONSULTS/ASSESSMENT AND PLAN:   IM NOTE:   Assessment/Plan:   1. Upper GI bleed, with evidence of recent variceal bleed s/p banding 1/23-adult GI bland diet, IV Protonix 40 mg every 12 hours, s/p EGD on 1/23 that revealed esophageal varices with stigmata of recent bleeding s/p banding. 2.  Acute blood loss anemia-hemoglobin 6.6 on presentation, s/p 3 units PRBCs. Continue to trend hemoglobin with H/H every 12 hours. Likely secondary to recent variceal bleed given EGD findings as discussed above. 3.  Acute complicated UTI plan continue IV ceftriaxone 1000 mg every 24 hours, urine culture revealed E. coli sensitive to third-generation cephalosporin. 4.  Pancytopenia-s/p 1 pack of platelets, hematology consulted. 5.  ITP-platelets 70G on 7/66, s/p 1 pack of platelets, appreciate hematology recommendations. Hematology plans to start IV dexamethasone 40 mg for 4 days. Goal platelet count above 50,000. GI NOTE:   Assessment/Plan:   Hgb noted, pt received 1 u RBC   Monitor stools   Protonix 40 mg IV every 12   Nadolol 20 mg daily    GI soft diet    Medicate for nausea as needed    Monitor CBC, CMP and INR daily   Liver serology negative thus far   Repeat EGD in approx 6-9 months, pt to arrange at follow up visit    Supportive care   Continue to monitor           HEMATOLOGY NOTE:   Assessment/Plan: Thrombocytopenia as above.  S/p 2 units of platelet without any increment in platelet count   - Continue dexamethasone 40 mg daily for 4 doses for ITP. Was scheduled to start Promacta at [facility] clinic but has not started yet. - Upper GI bleed: EGD with Grade 3/4 esophageal varices with stigmata of recent bleed. Three bands were placed with hemostasis. Stomach showed markedly severe portal hypertensive gastropathy, changes with severe congestion. Duodenum unremarkable. No biopsies were done in view of the patient's severe thrombocytopenia.    - Hemoglobin 6.9. 1 unit of pRBC's ordered. Post hgb 8.8. Platelets remain low but stable 30. Giving and additional dose   - Cardiology consulted for bradycardia. - Will continue to follow          CARDIOLOGY NOTE:   Diagnostic Assessment and Plan: On a clinical basis, the patient presents with no active cardiovascular symptomatology and present evidence of sinus bradycardia in the absence of additional conduction abnormalities. A component of her bradycardia may be related to her bedrest condition throughout the majority of hospitalization, especially in view of review of prior heart rates demonstrating evidence of rates suggestive of sinus rhythm. At present, the withholding of beta-blocker therapy would be advisable in spite of its potential benefit in the face of her portal hypertension with needs of appropriate heart rate monitoring with a resumption of activities to determine ability to initiate a beta-blocker, potentially with dose reduction from that initially chosen. At present no cardiovascular intervention is indicated. Additional management presently will be deferred to primary care in addition to that of the gastroenterology and hematology service on the basis of her more significant active medical issues.   Ongoing appropriate lifestyle modification to achieve weight reduction will benefit diastolic cardiac performance as well as reducing risk of the development of obstructive sleep apnea with its adverse cardiovascular effects in addition to that of needs of ongoing aggressive risk factor modification of blood pressure, diabetes and serum lipids in attempt to reduce risk of future atherosclerotic development. We will further evaluate her should additional cardiovascular difficulties or concerns arise. MEDICATIONS:   DECADRON 40 mg DAILY PO         ORDERS:   Continue medications    Contact isolation             CM ASSESSMENT OR NOTE:   Transfer from ICU on 1/24. EGD w/esophageal variceal banding on 1/23. IV rocephin and protonix. Plan remains for pt to return home, no needs. Will follow. Gastrointestinal Bleeding, Upper - Care Day 3 (1/24/2023) by Jeff Ruffin       Review Status Review Entered   Completed 1/25/2023 1103       Created By   Jeff Ruffin      Criteria Review      Care Day: 3 Care Date: 1/24/2023 Level of Care: Intermediate Care    Guideline Day 2    Level Of Care    ( ) Floor    1/25/2023 11:03 AM EST by Yeni Dunn      intermediate    Clinical Status    (X) * Hypotension absent    1/25/2023 11:03 AM EST by Osvaldo Shell      BP: 98/52 109/38 109/50    ( ) * Bleeding absent or reduced    1/25/2023 11:03 AM EST by Yeni Dunn      Hemoglobin Quant: 7.4 (L)  Hematocrit: 23.2 (L)    Activity    (X) Activity as tolerated    1/25/2023 11:03 AM EST by Yeni Dunn      Up as tolerated    Routes    (X) * Oral hydration tolerated    1/25/2023 11:03 AM EST by Osvaldo Liriano      po hydration    (X) * Oral diet tolerated    1/25/2023 11:03 AM EST by Yeni Dunn      ADULT DIET;  Clear Liquid    Interventions    (X) * NG tube absent    1/25/2023 11:03 AM EST by Osvaldo Liriano      absent    (X) Hgb/Hct    1/25/2023 11:03 AM EST by Yeni Dunn      Hemoglobin Quant: 8.0 (L) 7.6 (L) 7.4 (L)  Hematocrit: 24.0 (L) 23.2 (L)    Medications    (X) Proton pump inhibitor    1/25/2023 11:03 AM EST by Osvaldo Shell      PROTONIX 40 mg q12H IV    (X) Antibiotic prophylaxis in cirrhotic patient    1/25/2023 11:03 AM EST by Osvaldo Shell      ROCEPHIN 1,000 mg q24H IV       Definitions for Care Day 3    Hypotension absent    (X) Hypotension absent, as indicated by  1 or more  of the following  (1) (2) (3) (4):       (X) SBP greater than or equal to 90 mm Hg and without recent decrease greater than 40 mm Hg from       baseline in adult or child 10 years or older       * Milestone   Additional Notes   DATE: 01/24             PERTINENT UPDATES:   Transferred out from the ICU. Hgb drop to 7 overnight, improved to 8.0 this am. Patient was scoped yesterday, had three varices grade 3/4 in diameter with stigmata of recent hemorrhage, 2 bands were placed. Brown BM x2. Diet will be advanced to clear today. Patient had periods of tachypnea, RR: 30 28 25. On IV Rocephin 1000 mg and Protonix 40 mg. VITALS:    T: 98    HR: 51-59    SpO2: 92%RA           ABNL/PERTINENT LABS/RADIOLOGY/DIAGNOSTIC STUDIES:   Meter Glucose: 351 (H) 323 (H) 342 (H)      WBC: 2.2 (L)   RBC: 2.59 (L)   Platelet Count: 32 (L)   Chloride: 109 (H)   CO2: 20 (L)   BUN,BUNPL: 36 (H)   Glucose, Random: 287 (H)   CALCIUM, SERUM, [id number]: 8.2 (L)   Phosphorus: 4.5   Total Protein: 5.1 (L)   Albumin: 3.3 (L)      WBC: 1.6 (L)   RBC: 2.30 (L)   Platelet Count: 29 (L)   Prothrombin Time: 17.0 (H)   INR: 1.5         PHYSICAL EXAM:   NECK: Supple, symmetrical, no adenopathy, surgical scar right side   ABDOMEN:  No scars, normal bowel sounds, soft, warm, non-distended, mild to moderate/tenderness LUQ/LLQ,    MUSCULOSKELETAL:  There is no redness, warmth, or swelling of the joints. Full range of motion noted. Motor strength is 5 out of 5 all extremities bilaterally. Tone is normal. Small wound, old, crusting on LLE. MD CONSULTS/ASSESSMENT AND PLAN:   IM NOTE:    Assessment/Plan:   1.   Upper GI bleed, with evidence of recent variceal bleed s/p banding 1/23-clear liquid diet, continue octreotide infusion, IV Protonix 40 mg every 12 hours, s/p EGD on 1/23 that revealed esophageal varices with stigmata of recent bleeding s/p banding. 2.  Acute blood loss anemia-hemoglobin 6.6 on presentation, s/p 2 units PRBCs. Continue to trend hemoglobin with H/H every 6 hours. Likely secondary to recent variceal bleed given EGD findings as discussed above. 3.  Acute complicated UTI plan continue IV ceftriaxone 1000 mg every 24 hours, follow urine culture. 4.  Pancytopenia-s/p 1 pack of platelets, hematology consulted. 5.  ITP-platelets 98A on 9/60, s/p 1 pack of platelets, appreciate hematology recommendations. Hematology plans to start IV dexamethasone 40 mg for 4 days. Goal platelet count above 50,000. CRITICAL CARE NOTE:   Gastrointestinal   GI bleed    Likely esophageal varices related to below. S/p Sandostatin, Rocephin. Protonix bolus, 2 PRBC in  ED    Continue Sandostatin and Protonix drip    Continue to monitor H/H    Gi scope at 4pm 01/23    Further work-up and management per GI       Cryptogenic cirrhosis   Splenomegaly and subsequent portal hypertension found on recent scans at [facility]    CT scan again demonstrates cirrhotic morphology of the liver significantly enlarged spleen measuring 21 cm. Mild edematous wall thickening of the ascending colon is noted, likely related to portal hypertension. Small volume of ascites and collateral vessels including esophageal varices    Negative Hepatitis panel- December at [facility]    Not decompensated LFTs within normal as well as normal ammonia lvl. INR 1.4    Continue to monitor LFTs and daily INR, appreciate GI input       Infectious Disease   Acute cystitis with hematuria    UA positive nitrates as well as small leukocyte  White count 10-20    Follow blood and urine cultures check baseline CRP and  Pro-Ty    Rocephin 1g day 2       Hematology/Oncology   Acute blood loss anemia     Hgb 8.0-->6.6-->7.7.  2/2     Underlying iron deficiency anemia, does not tolerate oral replacements was ordered Venofer 300 mg IV x 2 doses in December 2022    Received 2 units of PRBC and 1 u PLTs in ED, recheck Hgb    Transfuse for Hgb <7       Pancytopenia    Leukopenia/anemia likely due to her underlying liver disease/cirrhosis    Thrombocytopenia likely due to her presumed ITP    Most recent labs from [facility]    1/18: WBC 1.9, Hgb 10.1, platelet count 27       Presumed ITP    Increased megakaryocytes noted on recent bone marrow biopsy    Has not started Promacta which was prescribed recently for platelet goal > 50 K, she was waiting until Hepatology follow up next week. Per chart review looks like they possibly switch this to Doptelet 20 mg po daily on 1/20/23    Platelet count 27 on 1/18, 34 today    Follows with hem/onc and has hepatology appt. at [facility]    Solumedrol 60 daily    Appreciate hem/onc recs          GI NOTE:   Assessment:   Hematemesis   Melena   Anemia, acute GI bleed      EGD 1/23/23- Grade 3/4 esophageal varices with stigmata of recent bleed. Three bands were placed with hemostasis. Stomach showed markedly severe portal hypertensive gastropathy, changes with severe congestion. Duodenum unremarkable. No biopsies were done in view of the patient's severe thrombocytopenia. Plan:   Critical care management per ICU team   Discontinue octreotide drip   Protonix 40 mg IV every 12   Nadolol 20 mg daily    Clear diet today    Medicate for nausea as needed    Monitor CBC, CMP and INR daily   Liver serology pending    Supportive care   Continue to monitor           HEMATOLOGY NOTE:   A/P   - Thrombocytopenia     - S/p 2 units of platelet transfusion without any increment in platelet count. - Continue dexamethasone 40 mg daily for 4 doses for ITP. Was scheduled to start Promacta at [facility] clinic but has not started yet. - Upper GI bleed: EGD with Grade 3/4 esophageal varices with stigmata of recent bleed. Three bands were placed with hemostasis.   Stomach showed markedly severe portal hypertensive gastropathy, changes with severe congestion. Duodenum unremarkable. No biopsies were done in view of the patient's severe thrombocytopenia.    - Hemoglobin 8.0  Transfuse platelets to keep above 50,000. Platelets 32. Will continue to follow. MEDICATIONS:   DECADRON 40 mg DAILY PO      magnesium sulfate 2000 mg IVPB ONCE IV      0.9 % sodium chloride infusion 125 mL/hr CONTINUOUS IV D/C'd      SANDOSTATIN 500 mcg infusion 10 mL/hr CONTINUOUS IV D/C'd          ORDERS:   CBC with Auto Differential    Comprehensive Metabolic Panel    Continue medications    Contact isolation    Telemetry monitoring - 72 hour duration         CM ASSESSMENT OR NOTE:   Updated plan of care. EGD with 3 variceal banding. Follows at [facility]. Plan remains home with spouse. Switched to iv protonix.  Will follow

## 2023-01-27 VITALS
TEMPERATURE: 98 F | DIASTOLIC BLOOD PRESSURE: 54 MMHG | HEIGHT: 70 IN | RESPIRATION RATE: 20 BRPM | WEIGHT: 228.3 LBS | HEART RATE: 50 BPM | SYSTOLIC BLOOD PRESSURE: 135 MMHG | BODY MASS INDEX: 32.69 KG/M2 | OXYGEN SATURATION: 99 %

## 2023-01-27 LAB
ALBUMIN SERPL-MCNC: 3.6 G/DL (ref 3.5–5.2)
ALP BLD-CCNC: 47 U/L (ref 35–104)
ALT SERPL-CCNC: 50 U/L (ref 0–32)
ANION GAP SERPL CALCULATED.3IONS-SCNC: 11 MMOL/L (ref 7–16)
AST SERPL-CCNC: 40 U/L (ref 0–31)
BASOPHILS ABSOLUTE: 0 E9/L (ref 0–0.2)
BASOPHILS RELATIVE PERCENT: 0 % (ref 0–2)
BILIRUB SERPL-MCNC: 0.4 MG/DL (ref 0–1.2)
BUN BLDV-MCNC: 37 MG/DL (ref 6–23)
CALCIUM SERPL-MCNC: 8.6 MG/DL (ref 8.6–10.2)
CHLORIDE BLD-SCNC: 104 MMOL/L (ref 98–107)
CO2: 19 MMOL/L (ref 22–29)
CREAT SERPL-MCNC: 1 MG/DL (ref 0.5–1)
EOSINOPHILS ABSOLUTE: 0 E9/L (ref 0.05–0.5)
EOSINOPHILS RELATIVE PERCENT: 0 % (ref 0–6)
GFR SERPL CREATININE-BSD FRML MDRD: >60 ML/MIN/1.73
GLUCOSE BLD-MCNC: 379 MG/DL (ref 74–99)
HCT VFR BLD CALC: 23.3 % (ref 34–48)
HEMOGLOBIN: 7.7 G/DL (ref 11.5–15.5)
IMMATURE GRANULOCYTES #: 0.02 E9/L
IMMATURE GRANULOCYTES %: 1.9 % (ref 0–5)
INR BLD: 1.4
LYMPHOCYTES ABSOLUTE: 0.13 E9/L (ref 1.5–4)
LYMPHOCYTES RELATIVE PERCENT: 12.6 % (ref 20–42)
MAGNESIUM: 2.2 MG/DL (ref 1.6–2.6)
MCH RBC QN AUTO: 30.1 PG (ref 26–35)
MCHC RBC AUTO-ENTMCNC: 33 % (ref 32–34.5)
MCV RBC AUTO: 91 FL (ref 80–99.9)
METER GLUCOSE: 341 MG/DL (ref 74–99)
METER GLUCOSE: 348 MG/DL (ref 74–99)
METER GLUCOSE: 381 MG/DL (ref 74–99)
MONOCYTES ABSOLUTE: 0.05 E9/L (ref 0.1–0.95)
MONOCYTES RELATIVE PERCENT: 4.9 % (ref 2–12)
NEUTROPHILS ABSOLUTE: 0.83 E9/L (ref 1.8–7.3)
NEUTROPHILS RELATIVE PERCENT: 80.6 % (ref 43–80)
OVALOCYTES: ABNORMAL
PDW BLD-RTO: 14.3 FL (ref 11.5–15)
PHOSPHORUS: 3.8 MG/DL (ref 2.5–4.5)
PLATELET # BLD: 27 E9/L (ref 130–450)
PLATELET CONFIRMATION: NORMAL
PMV BLD AUTO: 11.1 FL (ref 7–12)
POIKILOCYTES: ABNORMAL
POLYCHROMASIA: ABNORMAL
POTASSIUM SERPL-SCNC: 4.4 MMOL/L (ref 3.5–5)
PROTHROMBIN TIME: 15.8 SEC (ref 9.3–12.4)
RBC # BLD: 2.56 E12/L (ref 3.5–5.5)
SODIUM BLD-SCNC: 134 MMOL/L (ref 132–146)
TEAR DROP CELLS: ABNORMAL
TOTAL PROTEIN: 5.5 G/DL (ref 6.4–8.3)
WBC # BLD: 1 E9/L (ref 4.5–11.5)

## 2023-01-27 PROCEDURE — 36415 COLL VENOUS BLD VENIPUNCTURE: CPT

## 2023-01-27 PROCEDURE — C9113 INJ PANTOPRAZOLE SODIUM, VIA: HCPCS | Performed by: INTERNAL MEDICINE

## 2023-01-27 PROCEDURE — 6370000000 HC RX 637 (ALT 250 FOR IP): Performed by: INTERNAL MEDICINE

## 2023-01-27 PROCEDURE — 84100 ASSAY OF PHOSPHORUS: CPT

## 2023-01-27 PROCEDURE — 2580000003 HC RX 258: Performed by: INTERNAL MEDICINE

## 2023-01-27 PROCEDURE — A4216 STERILE WATER/SALINE, 10 ML: HCPCS | Performed by: INTERNAL MEDICINE

## 2023-01-27 PROCEDURE — 85025 COMPLETE CBC W/AUTO DIFF WBC: CPT

## 2023-01-27 PROCEDURE — 85610 PROTHROMBIN TIME: CPT

## 2023-01-27 PROCEDURE — 82962 GLUCOSE BLOOD TEST: CPT

## 2023-01-27 PROCEDURE — 83735 ASSAY OF MAGNESIUM: CPT

## 2023-01-27 PROCEDURE — 6360000002 HC RX W HCPCS: Performed by: INTERNAL MEDICINE

## 2023-01-27 PROCEDURE — 80053 COMPREHEN METABOLIC PANEL: CPT

## 2023-01-27 PROCEDURE — 99239 HOSP IP/OBS DSCHRG MGMT >30: CPT | Performed by: INTERNAL MEDICINE

## 2023-01-27 RX ORDER — INSULIN LISPRO 100 [IU]/ML
4 INJECTION, SOLUTION INTRAVENOUS; SUBCUTANEOUS ONCE
Status: COMPLETED | OUTPATIENT
Start: 2023-01-27 | End: 2023-01-27

## 2023-01-27 RX ORDER — NADOLOL 20 MG/1
20 TABLET ORAL DAILY
Qty: 30 TABLET | Refills: 0 | Status: SHIPPED | OUTPATIENT
Start: 2023-01-28

## 2023-01-27 RX ADMIN — LEVOTHYROXINE SODIUM 88 MCG: 0.09 TABLET ORAL at 05:50

## 2023-01-27 RX ADMIN — NADOLOL 20 MG: 20 TABLET ORAL at 08:46

## 2023-01-27 RX ADMIN — INSULIN LISPRO 12 UNITS: 100 INJECTION, SOLUTION INTRAVENOUS; SUBCUTANEOUS at 08:48

## 2023-01-27 RX ADMIN — INSULIN LISPRO 16 UNITS: 100 INJECTION, SOLUTION INTRAVENOUS; SUBCUTANEOUS at 11:24

## 2023-01-27 RX ADMIN — INSULIN LISPRO 4 UNITS: 100 INJECTION, SOLUTION INTRAVENOUS; SUBCUTANEOUS at 11:33

## 2023-01-27 RX ADMIN — SODIUM CHLORIDE, PRESERVATIVE FREE 10 ML: 5 INJECTION INTRAVENOUS at 08:38

## 2023-01-27 RX ADMIN — SODIUM CHLORIDE, PRESERVATIVE FREE 40 MG: 5 INJECTION INTRAVENOUS at 08:46

## 2023-01-27 RX ADMIN — INSULIN LISPRO 12 UNITS: 100 INJECTION, SOLUTION INTRAVENOUS; SUBCUTANEOUS at 16:35

## 2023-01-27 NOTE — PROGRESS NOTES
PROGRESS NOTE        Patient Presents with/Seen in Consultation For      Reason for Consult: active GI bleed hemoglobin 6.6  CHIEF COMPLAINT:  hematemesis     Subjective:     Patient seen laying in bed, in NAD.  at [facility]. Tolerating diet. Denies any N/V. C/O abdominal bloating, felt to be r/t soda she drank yesterday. No BM yet today, feels like she could go. Ambulated to bathroom. POC reviewed with the patient & , all questions answered. Review of Systems  Aside from what was mentioned in the PMH and HPI, essentially unremarkable, all others negative. Objective:     BP (!) 146/65   Pulse 55   Temp 97.9 °F (36.6 °C) (Oral)   Resp 18   Ht 5' 10\" (1.778 m)   Wt 228 lb 4.8 oz (103.6 kg)   SpO2 99%   BMI 32.76 kg/m²     General appearance: alert, awake, laying in bed, and cooperative  Eyes: conjunctiva pale, sclera anicteric. PERRL.   Lungs: clear to auscultation bilaterally  Heart: regular rate and rhythm, no murmur, 2+ pulses; no edema  Abdomen: soft, non-tender; bowel sounds normal, rounded  Extremities: extremities without edema  Pulses: 2+ and symmetric  Skin: Skin color pale, texture, turgor normal.   Neurologic: Grossly normal    insulin glargine (LANTUS) injection vial 10 Units, Nightly  0.9 % sodium chloride infusion, PRN  insulin lispro (HUMALOG) injection vial 0-16 Units, TID WC  insulin lispro (HUMALOG) injection vial 0-4 Units, Nightly  atorvastatin (LIPITOR) tablet 40 mg, Daily  levothyroxine (SYNTHROID) tablet 88 mcg, Daily  pantoprazole (PROTONIX) 40 mg in sodium chloride (PF) 0.9 % 10 mL injection, Q12H  nadolol (CORGARD) tablet 20 mg, Daily  sodium chloride flush 0.9 % injection 5-40 mL, 2 times per day  sodium chloride flush 0.9 % injection 5-40 mL, PRN  0.9 % sodium chloride infusion, PRN  ondansetron (ZOFRAN-ODT) disintegrating tablet 4 mg, Q8H PRN   Or  ondansetron (ZOFRAN) injection 4 mg, Q6H PRN  polyethylene glycol (GLYCOLAX) packet 17 g, Daily PRN  acetaminophen (TYLENOL) tablet 650 mg, Q6H PRN   Or  acetaminophen (TYLENOL) suppository 650 mg, Q6H PRN  albuterol (PROVENTIL) nebulizer solution 2.5 mg, Q2H PRN  cefTRIAXone (ROCEPHIN) 1,000 mg in sterile water 10 mL IV syringe, Q24H  glucose chewable tablet 16 g, PRN  dextrose bolus 10% 125 mL, PRN   Or  dextrose bolus 10% 250 mL, PRN  glucagon (rDNA) injection 1 mg, PRN  dextrose 10 % infusion, Continuous PRN       Data Review  CBC:   Lab Results   Component Value Date/Time    WBC 1.0 01/27/2023 03:16 AM    RBC 2.56 01/27/2023 03:16 AM    HGB 7.7 01/27/2023 03:16 AM    HCT 23.3 01/27/2023 03:16 AM    MCV 91.0 01/27/2023 03:16 AM    MCH 30.1 01/27/2023 03:16 AM    MCHC 33.0 01/27/2023 03:16 AM    RDW 14.3 01/27/2023 03:16 AM    PLT 27 01/27/2023 03:16 AM    MPV 11.1 01/27/2023 03:16 AM     CMP:    Lab Results   Component Value Date/Time     01/27/2023 03:16 AM    K 4.4 01/27/2023 03:16 AM     01/27/2023 03:16 AM    CO2 19 01/27/2023 03:16 AM    BUN 37 01/27/2023 03:16 AM    CREATININE 1.0 01/27/2023 03:16 AM    GFRAA >60 01/18/2022 03:15 PM    LABGLOM >60 01/27/2023 03:16 AM    GLUCOSE 379 01/27/2023 03:16 AM    PROT 5.5 01/27/2023 03:16 AM    LABALBU 3.6 01/27/2023 03:16 AM    CALCIUM 8.6 01/27/2023 03:16 AM    BILITOT 0.4 01/27/2023 03:16 AM    ALKPHOS 47 01/27/2023 03:16 AM    AST 40 01/27/2023 03:16 AM    ALT 50 01/27/2023 03:16 AM     Hepatic Function Panel:    Lab Results   Component Value Date/Time    ALKPHOS 47 01/27/2023 03:16 AM    ALT 50 01/27/2023 03:16 AM    AST 40 01/27/2023 03:16 AM    PROT 5.5 01/27/2023 03:16 AM    BILITOT 0.4 01/27/2023 03:16 AM    BILIDIR 0.2 01/22/2023 04:28 PM    IBILI 0.6 01/22/2023 04:28 PM    LABALBU 3.6 01/27/2023 03:16 AM     No components found for: CHLPL    Lab Results   Component Value Date    TRIG 100 12/01/2022       Lab Results   Component Value Date    HDL 32 01/23/2023    HDL 42 12/01/2022       Lab Results   Component Value Date    LDLCALC 47 01/23/2023    [address] 40 12/01/2022       Lab Results   Component Value Date    LABVLDL 21 01/23/2023      PT/INR:    Lab Results   Component Value Date/Time    PROTIME 15.8 01/27/2023 03:16 AM    INR 1.4 01/27/2023 03:16 AM     IRON:    Lab Results   Component Value Date/Time    IRON 100 01/23/2023 11:30 AM     Iron Saturation:  No components found for: PERCENTFE  FERRITIN:    Lab Results   Component Value Date/Time    FERRITIN 110 01/23/2023 11:30 AM         Assessment:     Active Problems:  Hematemesis  Melena  Anemia, acute GI bleed  Cirrhosis, likely CORONEL  Pancytopenia  History of a stroke on Plavix  Fatigue  Weakness  History of ITP, follows with hematology at [facility]  EGD 1/23/23- Grade 3/4 esophageal varices with stigmata of recent bleed. Three bands were placed with hemostasis. Stomach showed markedly severe portal hypertensive gastropathy, changes with severe congestion. Duodenum unremarkable. No biopsies were done in view of the patient's severe thrombocytopenia. Plan:     Continue to monitor stools  Protonix 40 mg IV every 12  Nadolol 20 mg daily   GI soft diet   Dr. Nancy Capps hematology service at [facility] suggest oral Avatrombopag to improve thrombocytopenia. Doesn't appear available through inpatient pharmacy. Medicate for nausea as needed   Monitor CBC, CMP and INR daily  Liver serology negative  Repeat EGD in approx 6-9 months, will be scheduled at follow up visit.   Supportive care  Continue to monitor      Discussed with Dr. Shiloh Hunt per Dr. Debora Zamarripa, APRN - CNP  1/27/2023  9:15 AM For Dr. Abdirahman Garcia

## 2023-01-27 NOTE — DISCHARGE INSTRUCTIONS
Please check heart rate prior to taking Nadolol. If heart rate is <60 do not take medication. Please do not take Plavix until you follow up with hematology and they okay restarting medication.

## 2023-01-27 NOTE — DISCHARGE SUMMARY
[facility] Physician Discharge Summary       Lexie Goyal MD  [address] [facility] [phone]    Schedule an appointment as soon as possible for a visit      Ashlee Molina[address] [phone]    Schedule an appointment as soon as possible for a visit      Jessica Vasquez MD  [address]  [phone]    Schedule an appointment as soon as possible for a visit      Activity level: As tolerated     Dispo: Home      Condition on discharge: Stable     Patient ID:  Pa Ramirez  [id number]  57 y.o.  1960    Admit date: 1/22/2023    Discharge date and time:  1/27/2023  1:52 PM    Admission Diagnoses: Principal Problem:    Gastrointestinal hemorrhage  Active Problems:    Esophageal bleed, non-variceal    Thrombocythemia    Thrombocytopenia (HCC)    Acute blood loss anemia    Cirrhosis of liver without ascites (HCC)    History of ITP    Anemia    Sinus bradycardia    Bleeding esophageal varices (HCC)    Portal hypertension ([address]    Cerebrovascular accident (CVA) ([address]    Stenosis of right carotid artery    Primary hypertension    Type 2 diabetes mellitus without complication, without long-term current use of insulin ([address]    Pure hypercholesterolemia    Moderate obesity    Acidosis    Uncontrolled type 2 diabetes mellitus with hyperglycemia ([address]    Anemia associated with acute blood loss    Upper GI bleed  Resolved Problems:    * No resolved hospital problems.  *      Discharge Diagnoses: Principal Problem:    Gastrointestinal hemorrhage  Active Problems:    Esophageal bleed, non-variceal    Thrombocythemia    Thrombocytopenia (HCC)    Acute blood loss anemia    Cirrhosis of liver without ascites (HCC)    History of ITP    Anemia    Sinus bradycardia    Bleeding esophageal varices (HCC)    Portal hypertension (HCC)    Cerebrovascular accident (CVA) ([address]    Stenosis of right carotid artery    Primary hypertension    Type 2 diabetes mellitus without complication, without long-term current use of insulin (HCC)    Pure hypercholesterolemia    Moderate obesity    Acidosis    Uncontrolled type 2 diabetes mellitus with hyperglycemia (HCC)    Anemia associated with acute blood loss    Upper GI bleed  Resolved Problems:    * No resolved hospital problems. *      Consults:  IP CONSULT TO INTERNAL MEDICINE  IP CONSULT TO GENERAL SURGERY  IP CONSULT TO GI  IP CONSULT TO CRITICAL CARE  IP CONSULT TO GI  IP CONSULT TO CRITICAL CARE  IP CONSULT TO HEM/ONC  IP CONSULT TO CARDIOLOGY    Procedures: none    Hospital Course:   Patient Jeannie Knapp is a 57 y.o. presented with Upper GI bleed [K92.2]  Esophageal bleed, non-variceal []    57year old female with a past medical history of  ITP, liver cirrhosis, splenomegaly, and parotid cancer presented to the ED for hematemesis and black stools. She was admitted and treated as below. 1.  Upper GI bleed: Presented to the ED for hematemesis and melena. GI consulted. EGD on 1/23/23 showing esophageal varices with stigmata of recent bleed. 3 bands placed with hemostasis. Continue GI soft diet. Continue IV Protonix BID. On Nadolol daily, hold for HR <60. Repeat EGD in 6-9 months. 2.  Acute blood loss anemia: Likely secondary to recent variceal bleed. Was in ICU from 1/22-1/24. hemoglobin 6.6 on presentation, and dropped to 6.9 on 1/25. S/p 3 units PRBCs. Monitor H&H. Hgb stable. Transfuse prn for hgb <7. Hold Plavix until ok with hem/onc. 3.  Acute complicated UTI: Urine culture positive for E. Coli. Completed 5 days of IV ceftriaxone. 4.  Pancytopenia:  Hx of ITP. S/p  3 units of platelets. Hem/onc consulted. 5. ITP: S/p 3 U of platelets with minimal improvement. Plts 30k today. Completed IV dexamethasone 40 mg for 4 days. Was scheduled to start Promacta at [facility] but was never started. Hem/onc following. 6.  DMII:  Hemoglobin 11/9/22 on 7.0. High dose ssi. Lantus 10 U nightly. Hypoglycemic protocol. 7. Cryptogenic cirrhosis with known esophageal varices: Confirmed on EGD 1/23.     8.  History of stroke: Hold Plavix. Continue statin     9. History of right parotid carcinoma s/p right subtotal parotidectomy and radiation therapy (2012, 2013): Follows with the [facility] [employer] [facility]     10. Hypothyroidism: Continue Synthroid     11. Hypertension: Continue irbesartan     12. Obesity class I: Follow-up with PCP for diet and lifestyle modifications. 13. Asymptomatic bradycardia: Patient bradycardic even with nadolol held. Cardiology consulted. Recommended holding BB. Patient is now hemodynamically stable and ready for discharge. She is to follow up with PCP, Cardiology, GI and Hem/onc. Discharge Exam:  General Appearance: alert and oriented to person, place and time and in no acute distress  Skin: warm and dry  Head: normocephalic and atraumatic  Eyes: pupils equal, round, and reactive to light, extraocular eye movements intact, conjunctivae normal  Neck: neck supple and non tender without mass   Pulmonary/Chest: clear to auscultation bilaterally- no wheezes, rales or rhonchi, normal air movement, no respiratory distress  Cardiovascular: normal rate, normal S1 and S2 and no carotid bruits  Abdomen: soft, non-tender, non-distended, normal bowel sounds, no masses or organomegaly  Extremities: no cyanosis, no clubbing and no edema  Neurologic: no cranial nerve deficit and speech normal    I/O last 3 completed shifts:   In: 947 [P.O.:340; Blood:607]  Out: -   I/O this shift:  In: 10 [I.V.:10]  Out: -       LABS:  Recent Labs     01/25/23  0355 01/26/23  0530 01/27/23  0316    133 134   K 4.7 4.5 4.4    102 104   CO2 21* 21* 19*   BUN 38* 38* 37*   CREATININE 1.0 1.1* 1.0   GLUCOSE 322* 341* 379*   CALCIUM 8.4* 8.3* 8.6       Recent Labs     01/25/23  0355 01/25/23  0450 01/26/23  0035 01/26/23  0530 01/26/23 2057 01/27/23  1038 WBC 1.4*  --   --  1.1*  --  1.0*   RBC 2.21*  --   --  2.41*  --  2.56*   HGB 6.9*   < > 7.4* 7.3* 8.4* 7.7*   HCT 20.5*   < > 22.0* 22.1* 25.5* 23.3*   MCV 92.8  --   --  91.7  --  91.0   MCH 31.2  --   --  30.3  --  30.1   MCHC 33.7  --   --  33.0  --  33.0   RDW 14.5  --   --  14.6  --  14.3   PLT 29*   < > 27* 30*  --  27*   MPV 11.4  --   --  10.7  --  11.1    < > = values in this interval not displayed. No results for input(s): POCGLU in the last 72 hours. Imaging:  CT ABDOMEN PELVIS W IV CONTRAST Additional Contrast? None    Result Date: 1/22/2023  EXAMINATION: CT OF THE ABDOMEN AND PELVIS WITH CONTRAST 1/22/2023 3:34 pm TECHNIQUE: CT of the abdomen and pelvis was performed with the administration of intravenous contrast. Multiplanar reformatted images are provided for review. Automated exposure control, iterative reconstruction, and/or weight based adjustment of the mA/kV was utilized to reduce the radiation dose to as low as reasonably achievable. COMPARISON: None. HISTORY: ORDERING SYSTEM PROVIDED HISTORY: abd pain, rectal bleeding TECHNOLOGIST PROVIDED HISTORY: Reason for exam:->abd pain, rectal bleeding Additional Contrast?->None Decision Support Exception - unselect if not a suspected or confirmed emergency medical condition->Emergency Medical Condition (MA) FINDINGS: Lower Chest:  Visualized portion of the lower chest demonstrates no acute abnormality. Distal esophageal varices are noted. Organs: The liver demonstrates a cirrhotic morphology with hypertrophy of the caudate lobe and left lateral segment as well as a nodular surface. A recanalized paraumbilical vein is seen. The portal vein is patent. Status post cholecystectomy. No biliary ductal dilatation seen. Spleen is significantly enlarged measuring 21.0 cm. Prominent perisplenic collateral vessels are noted. The pancreas and adrenal glands are unremarkable. The kidneys enhance symmetrically without evidence of hydronephrosis. GI/Bowel: Stomach and duodenal sweep demonstrate no acute abnormality. There is no evidence of bowel obstruction. No evidence of abnormal bowel wall thickening or distension. Mild edematous wall thickening of the ascending colon is noted, likely related to portal hypertension. The appendix is visualized and is unremarkable. No evidence of acute appendicitis. There is diverticulosis without evidence of diverticulitis. Pelvis:  Bladder is unremarkable in appearance. The uterus and adnexa are without acute abnormality. Peritoneum/Retroperitoneum: A small volume of ascites is seen, predominantly in the perihepatic and perisplenic locations. No free air is noted. No evidence of lymphadenopathy. Aorta is normal in caliber. Bones/Soft Tissues:  No acute abnormality of the visualized osseous structures. Cirrhosis with portal hypertension manifesting as splenomegaly, ascending colon wall thickening, small volume of ascites and collateral vessels including esophageal varices. Diverticulosis without evidence of acute diverticulitis. XR CHEST PORTABLE    Result Date: 1/23/2023  EXAMINATION: ONE XRAY VIEW OF THE CHEST 1/23/2023 5:52 am COMPARISON: None. HISTORY: ORDERING SYSTEM PROVIDED HISTORY: SOB TECHNOLOGIST PROVIDED HISTORY: Reason for exam:->SOB FINDINGS: The lungs are without acute focal process. There is no effusion or pneumothorax. The cardiomediastinal silhouette is without acute process. The osseous structures are without acute process. No acute process. Patient Instructions:      Medication List        START taking these medications      nadolol 20 MG tablet  Commonly known as: CORGARD  Take 1 tablet by mouth daily Hold for heart rate <60.   Start taking on: January 28, 2023            Delilah Chung taking these medications      atorvastatin 40 MG tablet  Commonly known as: LIPITOR     ferrous sulfate 325 (65 Fe) MG tablet  Commonly known as: IRON 325     irbesartan 150 MG tablet  Commonly known as: AVAPRO     levothyroxine 88 MCG tablet  Commonly known as: SYNTHROID  Take 1 tablet Monday through Saturday, 1.5 tablets on Sunday     metFORMIN 1000 MG tablet  Commonly known as: GLUCOPHAGE  Take 1 tablet by mouth 2 times daily (with meals)     Briseyda Lawrence FlexTouch 100 UNIT/ML Sopn  Generic drug: Insulin Degludec  Inject 48 units subcutaneous  at night     Trulicity 1.5 WX/0.5NW SC injection  Generic drug: dulaglutide  Inject 1.5 mg into the skin once a week            STOP taking these medications      clopidogrel 75 MG tablet  Commonly known as: PLAVIX     pyridoxine 50 MG tablet  Commonly known as: B-6               Where to Get Your Medications        These medications were sent to [address], [address] [facility], [place] [facility] [address] [address] [facility]      Phone: [phone]   nadolol 20 MG tablet           35 minutes time spent reviewing patient chart, assessing patient, discussing plan of care with patient and family, discussing plan of care with collaborating physician, and charting. Signed:  Electronically signed by Kay Collins PA-C on 1/27/2023 at 1:52 PM   HOSPITALIST ATTENDING PHYSICIAN NOTE 1/27/2023 1938PM:    Details of the evaluation - subjective assessment (including medication profile, past medical, family and social history when applicable), examination, review of lab and test data, diagnostic impressions and medical decision making - performed by Kay Collins PA-C, were discussed with me on the date of service and I agree with clinical information herein unless otherwise noted. The patient has been evaluated by me personally earlier today.   Pt reports no fevers, chills,n/v.     Exam: heart reg at rate of 50, lungs cta, abd pos bs soft nt, ext neg for le edema    I agree with the assessment and plan of Jeniffer Perez PA-C    36 min spent by me reviewing records, interviewing/examining pt, interpreting data, discussion with nursing, formulating tx plan as noted in NP's note, and discharge planning. Time given for questions and all questions answered. Upper gi bleed  Anemia with acute blood loss  acidosis  Complicated uti  Pancytopenia  ITP  Dm type 2 uncontrolled  Cryptogenic cirrhosis  Hx of cva  Hypothyroidism  bradycardia      Electronically signed by Maryan Fermin D.O.   Hospitalist  [facility] at [facility]

## 2023-01-27 NOTE — PROGRESS NOTES
Spoke with Nick Burch NP with Dr Lizeth Luevano. Instructed to hold off on any d/c until after pt is seen by Dr Mike Bazan when he rounds. Depending on what Dr Mike Bazan decides when he rounds will determine if pt is ready for dc or not.

## 2023-01-27 NOTE — PROGRESS NOTES
[facility]  Hematology/Oncology  Consult      Patient Name: Saqib Jean  YOB: 1960  PCP: Eduard Crfat MD   Referring Provider:      Reason for Consultation:   Chief Complaint   Patient presents with    Emesis     Throwing up bright red blood this AM    Melena    Abdominal Pain      Interval history: Feels much better today post transfusion. No more black tarry stools. 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 for worsening pancytopenia. She had marrow evaluation in 2015 and 2017 and peripheral blood for myeloid NGS in 2018. Since that time worsening cytopenias. She then had a another marrow in November 2022 which showed normocellular marrow without acute abnormalities, increased megakaryocytes noted. PET scan was largely unrevealing although did show cirrhotic liver morphology with subsequent significant splenomegaly at 24 cm. Given relatively bland marrow she was started on treatment for presumed ITP with Doptelet 20mg daily. 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 Patient presented to the ED for evaluation of hematemesis and black stool ongoing for 24 hours. CT abd revealed cirrhosis and portal hypertension with splenomegaly and esophageal varices. CBC revealed hgb 6.6 patient s/p 1 unit of pRBC's. 2 more ordered. Positive UA on Rocephin. GI and General surgery following patient for EGD with possible banding today. ICU for further care. AFP, ROSETTA, CEA, iron profile, IG's, hepatitis panel pending. CMP with BUN 36, Mg 1.3. LFT's unremarkable. CBC with WBC 2.3, hgb 7.8 s/p 1 unit of pRBC's yesterday, platelets 24. Consultation for pancytopenia presumed ITP. Review of systems: Over 10 systems were reviewed and all were negative except as mentioned above. Diagnostic Data:     Past Medical History:   Diagnosis Date    Cerebral artery occlusion with cerebral infarction ([address]     Diabetes mellitus ([address]        Patient Active Problem List    Diagnosis Date Noted    Acidosis 01/26/2023    Uncontrolled type 2 diabetes mellitus with hyperglycemia ([address] 01/26/2023    Anemia associated with acute blood loss 01/26/2023    Upper GI bleed 01/26/2023    Sinus bradycardia 01/25/2023    Bleeding esophageal varices ([address] 01/25/2023    Portal hypertension ([address] 01/25/2023    Cerebrovascular accident (CVA) ([address] 01/25/2023    Stenosis of right carotid artery 01/25/2023    Primary hypertension 01/25/2023    Type 2 diabetes mellitus without complication, without long-term current use of insulin ([address] 01/25/2023    Pure hypercholesterolemia 01/25/2023    Moderate obesity 01/25/2023    Thrombocythemia 01/23/2023    Thrombocytopenia ([address] 01/23/2023    Acute blood loss anemia 01/23/2023    Cirrhosis of liver without ascites ([address] 01/23/2023    History of ITP 01/23/2023    Anemia 01/23/2023    Gastrointestinal hemorrhage 01/22/2023    Esophageal bleed, non-variceal 01/22/2023        Past Surgical History:   Procedure Laterality Date    CHOLECYSTECTOMY      UPPER GASTROINTESTINAL ENDOSCOPY  1/23/2023    EGD BAND LIGATION performed by Zayda Will MD at [facility] ENDOSCOPY       Family History  History reviewed. No pertinent family history. Social History    TOBACCO:   reports that she has never smoked. She has never used smokeless tobacco.  ETOH:   reports no history of alcohol use. Home Medications  Prior to Admission medications    Medication Sig Start Date End Date Taking?  Authorizing Provider   nadolol (CORGARD) 20 MG tablet Take 1 tablet by mouth daily Hold for heart rate <60. 1/28/23  Yes Amal Alfred Nguyễn PA-C   levothyroxine (SYNTHROID) 88 MCG tablet Take 1 tablet Monday through Saturday, 1.5 tablets on Sunday 12/12/22   FATOUMATA Hood   Dulaglutide (TRULICITY) 1.5 EL/9.5MS SOPN Inject 1.5 mg into the skin once a week 7/6/22   FATOUMATA Hood   atorvastatin (LIPITOR) 40 MG tablet Take 40 mg by mouth daily    Historical Provider, MD   ferrous sulfate (IRON 325) 325 (65 Fe) MG tablet Take 325 mg by mouth daily (with breakfast) 3/7/22 6/5/22  Historical Provider, MD   irbesartan (AVAPRO) 150 MG tablet  2/20/22   Historical Provider, MD   metFORMIN (GLUCOPHAGE) 1000 MG tablet Take 1 tablet by mouth 2 times daily (with meals) 4/6/22   FATOUMATA Hood   TRESIBA FLEXTOUCH 100 UNIT/ML SOPN Inject 48 units subcutaneous  at night 4/6/22   FATOUMATA Hood       Allergies  Allergies   Allergen Reactions    Shellfish-Derived Products Hives    Bacitracin Itching    Codeine      Other reaction(s): Other: See Comments, Unknown  Tingling arms, heart palpitations      Penicillins      Other reaction(s): Unknown  In childhood      Azithromycin Rash    Erythromycin Rash    Metoprolol Rash         Objective  BP (!) 135/54   Pulse 50   Temp 98 °F (36.7 °C) (Oral)   Resp 20   Ht 5' 10\" (1.778 m)   Wt 228 lb 4.8 oz (103.6 kg)   SpO2 99%   BMI 32.76 kg/m²     Physical Exam:   Performance Status:  General: AAO to person, place, time, in no acute distress,   Head and neck : PERRLA, EOMI . Sclera non icteric. Oropharynx : Clear  Neck: no JVD,  no adenopathy  Heart: Regular rate and regular rhythm, no murmur  Lungs: Clear to auscultation   Extremities: No edema,no cyanosis, no clubbing. Abdomen: Soft, non-tender;no masses, no organomegaly  Skin:  No rash. Neurologic:Cranial nerves grossly intact. No focal motor or sensory deficits .     Recent Laboratory Data-   Lab Results   Component Value Date    WBC 1.0 (L) 01/27/2023    HGB 7.7 (L) 01/27/2023    HCT 23.3 (L) 01/27/2023    MCV 91.0 01/27/2023    PLT 27 (L) 01/27/2023    LYMPHOPCT 12.6 (L) 01/27/2023 RBC 2.56 (L) 01/27/2023    MCH 30.1 01/27/2023    MCHC 33.0 01/27/2023    RDW 14.3 01/27/2023    NEUTOPHILPCT 80.6 (H) 01/27/2023    MONOPCT 4.9 01/27/2023    BASOPCT 0.0 01/27/2023    NEUTROABS 0.83 (L) 01/27/2023    LYMPHSABS 0.13 (L) 01/27/2023    MONOSABS 0.05 (L) 01/27/2023    EOSABS 0.00 (L) 01/27/2023    BASOSABS 0.00 01/27/2023       Lab Results   Component Value Date     01/27/2023    K 4.4 01/27/2023     01/27/2023    CO2 19 (L) 01/27/2023    BUN 37 (H) 01/27/2023    CREATININE 1.0 01/27/2023    GLUCOSE 379 (H) 01/27/2023    CALCIUM 8.6 01/27/2023    PROT 5.5 (L) 01/27/2023    LABALBU 3.6 01/27/2023    BILITOT 0.4 01/27/2023    ALKPHOS 47 01/27/2023    AST 40 (H) 01/27/2023    ALT 50 (H) 01/27/2023    LABGLOM >60 01/27/2023    GFRAA >60 01/18/2022       Lab Results   Component Value Date    IRON 100 01/23/2023    TIBC 272 01/23/2023    FERRITIN 110 01/23/2023           Radiology-    CT ABDOMEN PELVIS W IV CONTRAST Additional Contrast? None    Result Date: 1/22/2023  EXAMINATION: CT OF THE ABDOMEN AND PELVIS WITH CONTRAST 1/22/2023 3:34 pm TECHNIQUE: CT of the abdomen and pelvis was performed with the administration of intravenous contrast. Multiplanar reformatted images are provided for review. Automated exposure control, iterative reconstruction, and/or weight based adjustment of the mA/kV was utilized to reduce the radiation dose to as low as reasonably achievable. COMPARISON: None. HISTORY: ORDERING SYSTEM PROVIDED HISTORY: abd pain, rectal bleeding TECHNOLOGIST PROVIDED HISTORY: Reason for exam:->abd pain, rectal bleeding Additional Contrast?->None Decision Support Exception - unselect if not a suspected or confirmed emergency medical condition->Emergency Medical Condition (MA) FINDINGS: Lower Chest:  Visualized portion of the lower chest demonstrates no acute abnormality. Distal esophageal varices are noted. Organs:  The liver demonstrates a cirrhotic morphology with hypertrophy of the caudate lobe and left lateral segment as well as a nodular surface. A recanalized paraumbilical vein is seen. The portal vein is patent. Status post cholecystectomy. No biliary ductal dilatation seen. Spleen is significantly enlarged measuring 21.0 cm. Prominent perisplenic collateral vessels are noted. The pancreas and adrenal glands are unremarkable. The kidneys enhance symmetrically without evidence of hydronephrosis. GI/Bowel: Stomach and duodenal sweep demonstrate no acute abnormality. There is no evidence of bowel obstruction. No evidence of abnormal bowel wall thickening or distension. Mild edematous wall thickening of the ascending colon is noted, likely related to portal hypertension. The appendix is visualized and is unremarkable. No evidence of acute appendicitis. There is diverticulosis without evidence of diverticulitis. Pelvis:  Bladder is unremarkable in appearance. The uterus and adnexa are without acute abnormality. Peritoneum/Retroperitoneum: A small volume of ascites is seen, predominantly in the perihepatic and perisplenic locations. No free air is noted. No evidence of lymphadenopathy. Aorta is normal in caliber. Bones/Soft Tissues:  No acute abnormality of the visualized osseous structures. Cirrhosis with portal hypertension manifesting as splenomegaly, ascending colon wall thickening, small volume of ascites and collateral vessels including esophageal varices. Diverticulosis without evidence of acute diverticulitis. XR CHEST PORTABLE    Result Date: 1/23/2023  EXAMINATION: ONE XRAY VIEW OF THE CHEST 1/23/2023 5:52 am COMPARISON: None. HISTORY: ORDERING SYSTEM PROVIDED HISTORY: SOB TECHNOLOGIST PROVIDED HISTORY: Reason for exam:->SOB FINDINGS: The lungs are without acute focal process. There is no effusion or pneumothorax. The cardiomediastinal silhouette is without acute process. The osseous structures are without acute process. No acute process. 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 Workup as above. Started on treatment for presumed ITP with Doptelet 20mg daily. 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 - pancytopenia presumed ITP    - Hematemesis and black stool ongoing for 24 hours. - CT abd revealed cirrhosis and portal hypertension with splenomegaly and esophageal varices. - CBC revealed hgb 6.6 patient s/p 1 unit of pRBC's. 2 more ordered. - Positive UA on Rocephin.   - GI and General surgery following patient for EGD with possible banding today. - ICU for further care. - AFP, ROSETTA, CEA, iron profile, IG's, hepatitis panel pending. CMP with BUN 36, Mg 1.3. LFT's unremarkable. - CBC with WBC 2.3, hgb 7.8 s/p 1 unit of pRBC's yesterday, platelets 24. Attending addendum: Thrombocytopenia most likely seems to be due to underlying cirrhosis and hypersplenism with superimposed consumption due to variceal bleed. Patient however has diagnosis of [facility]. S/p 2 units of platelet transfusion without any increment in platelet count. Will do dexamethasone 40 mg daily for 4 doses for ITP. Was scheduled to start Promacta at [facility] but has not started yet. Upper GI bleed: EGD planned today. Hemoglobin 7.6. Transfuse platelets to keep above 50,000. Will continue to follow. 1/24/23  - Thrombocytopenia as above. - S/p 2 units of platelet transfusion without any increment in platelet count. - Continue dexamethasone 40 mg daily for 4 doses for ITP. Was scheduled to [facility] at [facility] but has not started yet. - Upper GI bleed: EGD with Grade 3/4 esophageal varices with stigmata of recent bleed. Three bands were placed with hemostasis.   Stomach showed markedly severe portal hypertensive gastropathy, changes with severe congestion. Duodenum unremarkable. No biopsies were done in view of the patient's severe thrombocytopenia.   - Hemoglobin 8.0  Transfuse platelets to keep above 50,000. Platelets 32. Will continue to follow. 1/25/23  - Thrombocytopenia as above. S/p 2 units of platelet without any increment in platelet count  - Continue dexamethasone 40 mg daily for 4 doses for ITP. Was scheduled to start Promacta at [facility] but has not started yet. - Upper GI bleed: EGD with Grade 3/4 esophageal varices with stigmata of recent bleed. Three bands were placed with hemostasis. Stomach showed markedly severe portal hypertensive gastropathy, changes with severe congestion. Duodenum unremarkable. No biopsies were done in view of the patient's severe thrombocytopenia.   - Hemoglobin 6.9. 1 unit of pRBC's ordered. Post hgb 8.8. Platelets remain low but stable 30. Giving and additional dose  - Cardiology consulted for bradycardia. - Will continue to follow    1/26/23  - Thrombocytopenia as above. - S/p dexamethasone 40 mg daily for 4 doses for ITP. Was scheduled to start Promacta at [facility] but has not started yet. - Upper GI bleed: EGD with Grade 3/4 esophageal varices with stigmata of recent bleed. Post banding. Stomach showed markedly severe portal hypertensive gastropathy, changes with severe congestion. No biopsies were done in view of the patient's severe thrombocytopenia.   - S/p 1 unit of pRBC's yesterday. Post transfusion hgb 8.8. Now 7.3 today. Platelets remain low but stable 30. Additional unit of platelets ordered yesterday. - Cardiology consulted for bradycardia.    Chronic leukopenia with slightly decreased but  adequate ANC related to peripheral sequestration by hypersplenism  - Will continue to follow    1/27/2023  - Thrombocytopenia multifactorial.  Cirrhosis hypersplenism, ITP, consumption from recent variceal bleed contributing.  -Did not respond to the steroids pulsed DEXA dose. -Patient has been prescribed Doptelet by her hematologist at [facility] clinic which she will start upon discharge. To be discharged today. - Upper GI bleed: EGD with Grade 3/4 esophageal varices with stigmata of recent bleed. Post banding. Stomach showed markedly severe portal hypertensive gastropathy, changes with severe congestion. No biopsies were done in view of the patient's severe thrombocytopenia. Hemoglobin stable at 7.7. Mild pancytopenia related to cirrhosis. Recent bone marrow biopsy November 2022 was unremarkable. She will follow with her Hematologist at [facility] upon discharge.            Sania Watson MD  Electronically signed 1/27/2023 at 2:40 PM

## 2023-01-28 LAB
BLOOD CULTURE, ROUTINE: NORMAL
CULTURE, BLOOD 2: NORMAL

## 2023-03-08 ENCOUNTER — OFFICE VISIT (OUTPATIENT)
Dept: ENDOCRINOLOGY | Age: 63
End: 2023-03-08
Payer: MEDICARE

## 2023-03-08 VITALS
DIASTOLIC BLOOD PRESSURE: 69 MMHG | BODY MASS INDEX: 30.92 KG/M2 | HEART RATE: 68 BPM | SYSTOLIC BLOOD PRESSURE: 114 MMHG | HEIGHT: 70 IN | OXYGEN SATURATION: 100 % | WEIGHT: 216 LBS

## 2023-03-08 DIAGNOSIS — E66.09 CLASS 1 OBESITY DUE TO EXCESS CALORIES WITH SERIOUS COMORBIDITY AND BODY MASS INDEX (BMI) OF 30.0 TO 30.9 IN ADULT: ICD-10-CM

## 2023-03-08 DIAGNOSIS — E11.65 UNCONTROLLED TYPE 2 DIABETES MELLITUS WITH HYPERGLYCEMIA (HCC): Primary | ICD-10-CM

## 2023-03-08 DIAGNOSIS — E03.9 ACQUIRED HYPOTHYROIDISM: ICD-10-CM

## 2023-03-08 LAB — HBA1C MFR BLD: 6.7 %

## 2023-03-08 PROCEDURE — 3074F SYST BP LT 130 MM HG: CPT | Performed by: CLINICAL NURSE SPECIALIST

## 2023-03-08 PROCEDURE — 99214 OFFICE O/P EST MOD 30 MIN: CPT | Performed by: CLINICAL NURSE SPECIALIST

## 2023-03-08 PROCEDURE — 3078F DIAST BP <80 MM HG: CPT | Performed by: CLINICAL NURSE SPECIALIST

## 2023-03-08 PROCEDURE — 83036 HEMOGLOBIN GLYCOSYLATED A1C: CPT | Performed by: CLINICAL NURSE SPECIALIST

## 2023-03-08 PROCEDURE — G8417 CALC BMI ABV UP PARAM F/U: HCPCS | Performed by: CLINICAL NURSE SPECIALIST

## 2023-03-08 PROCEDURE — 2022F DILAT RTA XM EVC RTNOPTHY: CPT | Performed by: CLINICAL NURSE SPECIALIST

## 2023-03-08 PROCEDURE — 1036F TOBACCO NON-USER: CPT | Performed by: CLINICAL NURSE SPECIALIST

## 2023-03-08 PROCEDURE — 3044F HG A1C LEVEL LT 7.0%: CPT | Performed by: CLINICAL NURSE SPECIALIST

## 2023-03-08 PROCEDURE — G8484 FLU IMMUNIZE NO ADMIN: HCPCS | Performed by: CLINICAL NURSE SPECIALIST

## 2023-03-08 PROCEDURE — G8427 DOCREV CUR MEDS BY ELIG CLIN: HCPCS | Performed by: CLINICAL NURSE SPECIALIST

## 2023-03-08 PROCEDURE — 3017F COLORECTAL CA SCREEN DOC REV: CPT | Performed by: CLINICAL NURSE SPECIALIST

## 2023-03-08 RX ORDER — AVATROMBOPAG MALEATE 20 MG/1
TABLET, FILM COATED ORAL
COMMUNITY

## 2023-03-08 NOTE — PROGRESS NOTES
[address] [facility] Department of Endocrinology Diabetes and Metabolism   [address] [facility] [address]   Phone: [phone]  Fax: [phone]    Date of Service: 3/8/2023    Primary Care Physician: Janay Galarza MD  Referring physician: No ref. provider found  Provider: FATOUMATA Reynolds - CNS     Reason for the visit:  Type 2 DM      History of Present Illness: The history is provided by the patient. No  was used. Accuracy of the patient data is excellent. Cheryl Love is a very pleasant 57 y.o. female seen today for diabetes management     Cheryl Love was diagnosed with diabetes at the age of 46   and currently on Metformin 1000 mg 1 tablet BID. Tresiba 48 units at night , Trulicity 1.5 mg once weekly     The patient has been checking blood sugar once per day   Bg usually mid 100's   .     Most recent A1c results summarized below  Lab Results   Component Value Date/Time    LABA1C 6.7 03/08/2023 02:04 PM    LABA1C 7.0 11/09/2022 01:01 PM    LABA1C 6.8 07/06/2022 03:10 PM       Patient has had no hypoglycemic episodes   The patient hasn't been mindful of what has been eating and wasn't following diabetes diet    No regular exercise but active at work    I reviewed current medications and the patient has no issues with diabetes medications  Cehryl Love is up to date with eye exam and denied any history of diabetic retinopathy    And also performs  own feet care  Microvascular complications:  No Retinopathy, Nephropathy or Neuropathy   Macrovascular complications: no CAD, PVD, or + Stroke 1/2012  The patient receives Flushot every year and up to date   No HX of pancreatitis  No Hx of MTC  No HX of gastroparesis   No HX of UTI/Mycotic infection   Was recently in hospital for esophogeal varices banded x 3   Received steroids   Recently diagnosed with liver cirrhosis       PAST MEDICAL HISTORY   Past Medical History:   Diagnosis Date    Cerebral artery occlusion with cerebral infarction [address]     Diabetes mellitus ([facility]        PAST SURGICAL HISTORY   Past Surgical History:   Procedure Laterality Date    CHOLECYSTECTOMY      UPPER GASTROINTESTINAL ENDOSCOPY  1/23/2023    EGD BAND LIGATION performed by Una Reagan MD at [address]   Tobacco:   reports that she has never smoked. She has never used smokeless tobacco.  Alcohol:   reports no history of alcohol use. Drugs:   has no history on file for drug use. FAMILY HISTORY   No family history on file. ALLERGIES AND DRUG REACTIONS   Allergies   Allergen Reactions    Shellfish-Derived Products Hives    Bacitracin Itching    Codeine      Other reaction(s): Other: See Comments, Unknown  Tingling arms, heart palpitations      Penicillins      Other reaction(s): Unknown  In childhood      Azithromycin Rash    Erythromycin Rash    Metoprolol Rash       CURRENT MEDICATIONS   Current Outpatient Medications   Medication Sig Dispense Refill    Avatrombopag Maleate (DOPTELET) 20 MG TABS Take by mouth      empagliflozin (JARDIANCE) 10 MG tablet Take 1 tablet by mouth daily 90 tablet 1    nadolol (CORGARD) 20 MG tablet Take 1 tablet by mouth daily Hold for heart rate <60. (Patient taking differently: Take 20 mg by mouth in the morning and 20 mg in the evening. Hold for heart rate <60. .) 30 tablet 0    levothyroxine (SYNTHROID) 88 MCG tablet Take 1 tablet Monday through Saturday, 1.5 tablets on Sunday 32 tablet 4    Dulaglutide (TRULICITY) 1.5 XG/4.2DD SOPN Inject 1.5 mg into the skin once a week 12 pen 3    atorvastatin (LIPITOR) 40 MG tablet Take 40 mg by mouth daily      TRESIBA FLEXTOUCH 100 UNIT/ML SOPN Inject 48 units subcutaneous  at night 15 pen 3    irbesartan (AVAPRO) 150 MG tablet  (Patient not taking: Reported on 3/8/2023)       No current facility-administered medications for this visit.        Review of Systems  Constitutional: No fever, no chills, no diaphoresis, no generalized weakness. HEENT: No blurred vision, No sore throat, no ear pain, no hair loss  Neck: denied any neck swelling, difficulty swallowing,   Cardio-pulmonary: No CP, SOB or palpitation, No orthopnea or PND. No cough or wheezing. GI: No N/V/D, no constipation, No abdominal pain, no melena or hematochezia   : Denied any dysuria, hematuria, flank pain, discharge, or incontinence. Skin: denied any rash, ulcer, Hirsute, or hyperpigmentation. MSK: denied any joint deformity, joint pain/swelling, muscle pain, or back pain. Neuro: no numbness, no tingling, no weakness, _    OBJECTIVE    /69   Pulse 68   Ht 5' 10\" (1.778 m)   Wt 216 lb (98 kg)   SpO2 100%   BMI 30.99 kg/m²   BP Readings from Last 4 Encounters:   03/08/23 114/69   01/27/23 (!) 135/54   11/09/22 113/66   07/06/22 112/68     Wt Readings from Last 6 Encounters:   03/08/23 216 lb (98 kg)   01/27/23 228 lb 4.8 oz (103.6 kg)   11/09/22 221 lb (100.2 kg)   07/06/22 219 lb (99.3 kg)   04/06/22 223 lb (101.2 kg)       Physical examination:  General: awake alert, oriented x3, no abnormal position or movements. HEENT: normocephalic non-traumatic, no exophthalmos   Neck: supple, no LN enlargement, no thyromegaly, no thyroid tenderness, no JVD. Pulm: Clear equal air entry no added sounds, no wheezing or rhonchi    CVS: S1 + S2, no murmur, no heave. Dorsalis pedis pulse palpable   Abd: soft lax, no tenderness, no organomegaly, audible bowel sounds. Skin: warm, no lesions, no rash.  No callus, no Ulcers, No acanthosis nigricans  Musculoskeletal: No back tenderness, no kyphosis/scoliosis    Neuro: CN intact, Monofilament sensation normal bilateral , muscle power normal  Psych: normal mood, and affect      Review of Laboratory Data:  I personally reviewed the following lab:  Lab Results   Component Value Date/Time    WBC 1.0 (L) 01/27/2023 03:16 AM    RBC 2.56 (L) 01/27/2023 03:16 AM    HGB 7.7 (L) 01/27/2023 03:16 AM    HCT 23.3 (L) 01/27/2023 03:16 AM MCV 91.0 01/27/2023 03:16 AM    MCH 30.1 01/27/2023 03:16 AM    MCHC 33.0 01/27/2023 03:16 AM    RDW 14.3 01/27/2023 03:16 AM    PLT 27 (L) 01/27/2023 03:16 AM    MPV 11.1 01/27/2023 03:16 AM      Lab Results   Component Value Date/Time     01/27/2023 03:16 AM    K 4.4 01/27/2023 03:16 AM    CO2 19 (L) 01/27/2023 03:16 AM    BUN 37 (H) 01/27/2023 03:16 AM    CREATININE 1.0 01/27/2023 03:16 AM    CALCIUM 8.6 01/27/2023 03:16 AM    LABGLOM >60 01/27/2023 03:16 AM    GFRAA >60 01/18/2022 03:15 PM      Lab Results   Component Value Date/Time    TSH 0.711 01/23/2023 11:30 AM    T4FREE 1.1 12/01/2022 12:00 AM    [id number] 7.7 01/23/2023 11:30 AM     Lab Results   Component Value Date/Time    LABA1C 6.7 03/08/2023 02:04 PM    GLUCOSE 379 01/27/2023 03:16 AM     Lab Results   Component Value Date/Time    LABA1C 6.7 03/08/2023 02:04 PM    LABA1C 7.0 11/09/2022 01:01 PM    LABA1C 6.8 07/06/2022 03:10 PM     Lab Results   Component Value Date/Time    TRIG 100 12/01/2022 12:00 AM    HDL 32 01/23/2023 11:30 AM    LDLCALC 47 01/23/2023 11:30 AM    CHOL 101 12/01/2022 12:00 AM     No results found for: Concha 30   Starr Pino, a 57 y.o.-old female seen in for the following issues       Assessment:      Diagnosis Orders   1. Uncontrolled type 2 diabetes mellitus with hyperglycemia (HCC)  POCT glycosylated hemoglobin (Hb A1C)      2. Acquired hypothyroidism        3. Class 1 obesity due to excess calories with serious comorbidity and body mass index (BMI) of 30.0 to 30.9 in adult                Plan:     1. Uncontrolled type 2 diabetes mellitus with hyperglycemia ([address]   Patient's diabetes well controlled.   Hemoglobin A1c 6.8%  Plan:Continue Trulicity 1.5 mg once weekly   Stop metformin due to recent liver cirrhosis diagnosis  Continue Tresiba to 48 units once per day   Start Jardiance 10 mg daily  The patient was advised to check blood sugars 2 times a day fasting and before dinner  Discussed with patient A1c and blood sugar goals   The patient counseled about the complications of uncontrolled diabetes    I encouraged diet and exercise  Diabetic labs ordered     2. Acquired hypothyroidism   Continue levothyroxine 88 mcg 1 tablet Monday through Saturday, 1-1/2 tablets on Sunday   Last TFT WNL     Patient taking on an empty stomach at least 1 hour before breakfast without combination of other medications     3. Class 1 obesity due to excess calories with serious comorbidity   Discussed lifestyle changes including diet and exercise with patient in depth. Also discussed with patient cardiovascular risk associated with obesity           I personally spent > 30 minutes spent  reviewed external notes from PCP and other patient's care team providers, and personally interpreted labs associated with the above diagnosis. I also ordered labs to further assess and manage the above addressed medical conditions. Return in about 3 months (around 6/8/2023). The above issues were reviewed with the patient who understood and agreed with the plan. Thank you for allowing us to participate in the care of this patient. Please do not hesitate to contact us with any additional questions. FATOUMATA Hood     [facility] and Endocr[facility]ology   [address] [place] [id number]   Phone: [phone]  Fax: [phone]  --------------------------------------------  An electronic signature was used to authenticate this note.  FATOUMATA Hood on 3/8/2023 at 2:13 PM

## 2023-04-12 ENCOUNTER — TELEPHONE (OUTPATIENT)
Dept: ENDOCRINOLOGY | Age: 63
End: 2023-04-12

## 2023-04-12 DIAGNOSIS — E11.65 UNCONTROLLED TYPE 2 DIABETES MELLITUS WITH HYPERGLYCEMIA (HCC): Primary | ICD-10-CM

## 2023-04-17 NOTE — TELEPHONE ENCOUNTER
Increase Trulicity to 3 mg once weekly. Increase Tresiba to 54 units once per day. Please give samples of Jardiance 10 mg once per day.   Send blood glucose log in 2 weeks

## 2023-04-18 RX ORDER — DULAGLUTIDE 3 MG/.5ML
3 INJECTION, SOLUTION SUBCUTANEOUS WEEKLY
Qty: 4 ADJUSTABLE DOSE PRE-FILLED PEN SYRINGE | Refills: 11 | Status: CANCELLED | OUTPATIENT
Start: 2023-04-18

## 2023-05-05 ENCOUNTER — TELEPHONE (OUTPATIENT)
Dept: ENDOCRINOLOGY | Age: 63
End: 2023-05-05

## 2023-07-10 ENCOUNTER — OFFICE VISIT (OUTPATIENT)
Dept: ENDOCRINOLOGY | Age: 63
End: 2023-07-10
Payer: MEDICARE

## 2023-07-10 VITALS
WEIGHT: 207 LBS | BODY MASS INDEX: 29.63 KG/M2 | OXYGEN SATURATION: 98 % | DIASTOLIC BLOOD PRESSURE: 53 MMHG | HEIGHT: 70 IN | HEART RATE: 82 BPM | SYSTOLIC BLOOD PRESSURE: 114 MMHG

## 2023-07-10 DIAGNOSIS — E03.9 ACQUIRED HYPOTHYROIDISM: ICD-10-CM

## 2023-07-10 DIAGNOSIS — E11.65 UNCONTROLLED TYPE 2 DIABETES MELLITUS WITH HYPERGLYCEMIA (HCC): Primary | ICD-10-CM

## 2023-07-10 DIAGNOSIS — E66.09 CLASS 1 OBESITY DUE TO EXCESS CALORIES WITH SERIOUS COMORBIDITY AND BODY MASS INDEX (BMI) OF 30.0 TO 30.9 IN ADULT: ICD-10-CM

## 2023-07-10 LAB — HBA1C MFR BLD: 7.1 %

## 2023-07-10 PROCEDURE — 3017F COLORECTAL CA SCREEN DOC REV: CPT | Performed by: CLINICAL NURSE SPECIALIST

## 2023-07-10 PROCEDURE — 1036F TOBACCO NON-USER: CPT | Performed by: CLINICAL NURSE SPECIALIST

## 2023-07-10 PROCEDURE — G8417 CALC BMI ABV UP PARAM F/U: HCPCS | Performed by: CLINICAL NURSE SPECIALIST

## 2023-07-10 PROCEDURE — 99214 OFFICE O/P EST MOD 30 MIN: CPT | Performed by: CLINICAL NURSE SPECIALIST

## 2023-07-10 PROCEDURE — G8427 DOCREV CUR MEDS BY ELIG CLIN: HCPCS | Performed by: CLINICAL NURSE SPECIALIST

## 2023-07-10 PROCEDURE — 3074F SYST BP LT 130 MM HG: CPT | Performed by: CLINICAL NURSE SPECIALIST

## 2023-07-10 PROCEDURE — 83037 HB GLYCOSYLATED A1C HOME DEV: CPT | Performed by: CLINICAL NURSE SPECIALIST

## 2023-07-10 PROCEDURE — 2022F DILAT RTA XM EVC RTNOPTHY: CPT | Performed by: CLINICAL NURSE SPECIALIST

## 2023-07-10 PROCEDURE — 3078F DIAST BP <80 MM HG: CPT | Performed by: CLINICAL NURSE SPECIALIST

## 2023-07-10 PROCEDURE — 3051F HG A1C>EQUAL 7.0%<8.0%: CPT | Performed by: CLINICAL NURSE SPECIALIST

## 2023-07-10 RX ORDER — LEVOTHYROXINE SODIUM 88 UG/1
TABLET ORAL
Qty: 32 TABLET | Refills: 4 | Status: SHIPPED
Start: 2023-07-10 | End: 2023-07-10 | Stop reason: SDUPTHER

## 2023-07-10 RX ORDER — LEVOTHYROXINE SODIUM 88 UG/1
TABLET ORAL
Qty: 96 TABLET | Refills: 2 | Status: SHIPPED | OUTPATIENT
Start: 2023-07-10

## 2023-07-10 NOTE — PROGRESS NOTES
[facility] Department of Endocrinology Diabetes and Metabolism   [address] [facility] [address]   Phone: [phone]  Fax: [phone]    Date of Service: 7/10/2023    Primary Care Physician: John Roberts MD  Referring physician: No ref. provider found  Provider: Whitney Garrett APRN - CNS     Reason for the visit:  Type 2 DM      History of Present Illness: The history is provided by the patient. No  was used. Accuracy of the patient data is excellent. Víctor Ríos is a very pleasant 57 y.o. female seen today for diabetes management     Víctor Ríos was diagnosed with diabetes at the age of 46   and currently on  Tresiba 54 units at night , Trulicity 3 mg once weekly, Jardiance 10 mg daily     The patient has been checking blood sugar once per day   Bg usually mid 100's   .     Most recent A1c results summarized below  Lab Results   Component Value Date/Time    LABA1C 7.1 07/10/2023 01:01 PM    LABA1C 6.7 03/08/2023 02:04 PM    LABA1C 7.0 11/09/2022 01:01 PM       Patient has had no hypoglycemic episodes   The patient hasn't been mindful of what has been eating and wasn't following diabetes diet    No regular exercise but active at work    I reviewed current medications and the patient has no issues with diabetes medications  Víctor Ríos is up to date with eye exam and denied any history of diabetic retinopathy    And also performs  own feet care  Microvascular complications:  No Retinopathy, Nephropathy or Neuropathy   Macrovascular complications: no CAD, PVD, or + Stroke 1/2012  The patient receives Flushot every year and up to date   No HX of pancreatitis  No Hx of MTC  No HX of gastroparesis   No HX of UTI/Mycotic infection   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

## 2023-08-10 RX ORDER — NITROFURANTOIN MACROCRYSTALS 100 MG/1
CAPSULE ORAL
COMMUNITY
Start: 2023-06-18

## 2023-08-10 NOTE — PROGRESS NOTES
[facility] PRE-ADMISSION TESTING INSTRUCTIONS    The Preadmission Testing patient is instructed accordingly using the following criteria (check applicable):    ARRIVAL INSTRUCTIONS:  [x] Parking the day of Surgery is located in the Main Entrance lot. Upon entering the door, make an immediate right to the surgery reception desk    [x] Bring photo ID and insurance card    [x] Bring in a copy of Living will or Durable Power of  papers. [x] Please be sure to arrange transportation to and from the hospital    [x] Please arrange for someone to be with you the remainder of the day due to having anesthesia      GENERAL INSTRUCTIONS:    [x] Nothing by mouth after midnight, including gum, candy, mints or water    [x] You may brush your teeth, but do not swallow any water    [] Take medications as instructed with 1-2 oz of water    [x] Stop herbal supplements and vitamins 5 days prior to procedure    [x] Follow preop dosing of blood thinners per physician instructions    [] Do not take insulin or oral diabetic medications    [x] If diabetic and have low blood sugar or feel symptomatic, take 1-2oz apple juice or glucose tablets    [] Bring inhalers day of surgery    [] Bring C-PAP/ Bi-Pap day of surgery    [] Bring urine specimen day of surgery    [x] Antibacterial Soap shower or bath AM of Surgery, no lotion, powders or creams to surgical site    [] Follow bowel prep as instructed per surgeon    [x] No tobacco products within 24 hours of surgery     [x] No alcohol or illegal drug use within 24 hours of surgery.     [x] Jewelry, body piercing's, eyeglasses, contact lenses and dentures are not permitted into surgery (bring cases)      [] Please do not wear any nail polish or make up on the day of surgery    [] If not already done, you can expect a call from registration    [x] If surgeon requests a time change you will be notified the day prior to surgery    [] If you receive a survey after

## 2023-08-15 ENCOUNTER — PREP FOR PROCEDURE (OUTPATIENT)
Dept: GASTROENTEROLOGY | Age: 63
End: 2023-08-15

## 2023-08-15 RX ORDER — SODIUM CHLORIDE 9 MG/ML
25 INJECTION, SOLUTION INTRAVENOUS PRN
Status: CANCELLED | OUTPATIENT
Start: 2023-08-15

## 2023-08-15 RX ORDER — 0.9 % SODIUM CHLORIDE 0.9 %
50 INTRAVENOUS SOLUTION INTRAVENOUS ONCE
Status: CANCELLED | OUTPATIENT
Start: 2023-08-15 | End: 2023-08-15

## 2023-08-15 RX ORDER — SODIUM CHLORIDE 0.9 % (FLUSH) 0.9 %
5-40 SYRINGE (ML) INJECTION EVERY 12 HOURS SCHEDULED
Status: CANCELLED | OUTPATIENT
Start: 2023-08-15

## 2023-08-15 RX ORDER — SODIUM CHLORIDE 0.9 % (FLUSH) 0.9 %
5-40 SYRINGE (ML) INJECTION PRN
Status: CANCELLED | OUTPATIENT
Start: 2023-08-15

## 2023-08-15 NOTE — H&P
Gastroenterology      Pre-operative History and Physical      HISTORY OF PRESENT ILLNESS:   Morgan Ortega is seen for a follow-up visit. Patient states she is feeling well overall. Last hemoglobin was up to 11.4, platelets were 72- per last labs last week. Appetite is good. Some nausea at times. Denies any emesis. Bowels move every third day, \"firm and brown\" in color. Denies any FMHx of colon cancer. Last colon 5 years ago, \"normal\" with Dr. John Wagner- at [facility]. Denies any heartburn or reflux symptoms, not taking anything for her stomach. Work up completed in the hospital reviewed with the patient, all questions answered. Denies melena, hematochezia, hematemesis. Plan of care as outlined discussed with patient at length with all questions answered, pt agrees. Shared decision-making was utilized and plan agreed upon. HISTORY:   Past Medical History:   Diagnosis Date    Cancer ([facility]) 2012    parotid    Cerebral artery occlusion with cerebral infarction [facility]) 2012    itermittant aphasia    Cirrhosis, nonalcoholic ([facility])     Diabetes mellitus ([facility])     Splenomegaly        PERTINENT FAMILY HISTORY:  No family history on file. MEDICATIONS:    Current Outpatient Medications:     nitrofurantoin (MACRODANTIN) 100 MG capsule, , Disp: , Rfl:     levothyroxine (SYNTHROID) 88 MCG tablet, TAKE ONE TABLET BY MOUTH MONDAY THROUGH SATURDAY, THEN TAKE ONE AND ONE-HALF TABLETS ON SUNDAY, Disp: 96 tablet, Rfl: 2    TRESIBA FLEXTOUCH 100 UNIT/ML SOPN, Inject 54 units subcutaneous  at night (Patient taking differently: Inject 54 units subcutaneous  at night.  1/2 dose PM 8/15/23), Disp: 15 Adjustable Dose Pre-filled Pen Syringe, Rfl: 3    Dulaglutide (TRULICITY) 3 TU/1.5MG SOPN, Inject 3 mg into the skin once a week (Patient taking differently: Inject 3 mg into the skin once a week monday), Disp: 12 Adjustable Dose Pre-filled Pen Syringe, Rfl: 3    Avatrombopag Maleate (DOPTELET) 20 MG TABS, Take by mouth 40mg 4 days pre-op, Disp: ,

## 2023-08-16 ENCOUNTER — ANESTHESIA (OUTPATIENT)
Dept: ENDOSCOPY | Age: 63
End: 2023-08-16
Payer: MEDICARE

## 2023-08-16 ENCOUNTER — HOSPITAL ENCOUNTER (OUTPATIENT)
Age: 63
Setting detail: OUTPATIENT SURGERY
Discharge: HOME | End: 2023-08-16
Attending: INTERNAL MEDICINE | Admitting: INTERNAL MEDICINE
Payer: MEDICARE

## 2023-08-16 ENCOUNTER — ANESTHESIA EVENT (OUTPATIENT)
Dept: ENDOSCOPY | Age: 63
End: 2023-08-16
Payer: MEDICARE

## 2023-08-16 VITALS
BODY MASS INDEX: 28.63 KG/M2 | OXYGEN SATURATION: 98 % | SYSTOLIC BLOOD PRESSURE: 144 MMHG | WEIGHT: 200 LBS | HEART RATE: 60 BPM | TEMPERATURE: 97.2 F | DIASTOLIC BLOOD PRESSURE: 65 MMHG | RESPIRATION RATE: 20 BRPM | HEIGHT: 70 IN

## 2023-08-16 LAB — GLUCOSE BLD-MCNC: 135 MG/DL (ref 74–99)

## 2023-08-16 PROCEDURE — 3609012300 HC EGD BAND LIGATION ESOPHGEAL/GASTRIC VARICES: Performed by: INTERNAL MEDICINE

## 2023-08-16 PROCEDURE — 2580000003 HC RX 258: Performed by: NURSE ANESTHETIST, CERTIFIED REGISTERED

## 2023-08-16 PROCEDURE — 2709999900 HC NON-CHARGEABLE SUPPLY: Performed by: INTERNAL MEDICINE

## 2023-08-16 PROCEDURE — 2720000010 HC SURG SUPPLY STERILE: Performed by: INTERNAL MEDICINE

## 2023-08-16 PROCEDURE — 82962 GLUCOSE BLOOD TEST: CPT

## 2023-08-16 PROCEDURE — 3700000001 HC ADD 15 MINUTES (ANESTHESIA): Performed by: INTERNAL MEDICINE

## 2023-08-16 PROCEDURE — 7100000011 HC PHASE II RECOVERY - ADDTL 15 MIN: Performed by: INTERNAL MEDICINE

## 2023-08-16 PROCEDURE — 7100000010 HC PHASE II RECOVERY - FIRST 15 MIN: Performed by: INTERNAL MEDICINE

## 2023-08-16 PROCEDURE — 6360000002 HC RX W HCPCS: Performed by: NURSE ANESTHETIST, CERTIFIED REGISTERED

## 2023-08-16 PROCEDURE — 3700000000 HC ANESTHESIA ATTENDED CARE: Performed by: INTERNAL MEDICINE

## 2023-08-16 RX ORDER — SODIUM CHLORIDE 9 MG/ML
INJECTION, SOLUTION INTRAVENOUS PRN
Status: CANCELLED | OUTPATIENT
Start: 2023-08-16

## 2023-08-16 RX ORDER — SODIUM CHLORIDE 0.9 % (FLUSH) 0.9 %
5-40 SYRINGE (ML) INJECTION PRN
Status: CANCELLED | OUTPATIENT
Start: 2023-08-16

## 2023-08-16 RX ORDER — 0.9 % SODIUM CHLORIDE 0.9 %
50 INTRAVENOUS SOLUTION INTRAVENOUS ONCE
Status: DISCONTINUED | OUTPATIENT
Start: 2023-08-16 | End: 2023-08-16 | Stop reason: HOSPADM

## 2023-08-16 RX ORDER — SODIUM CHLORIDE 9 MG/ML
25 INJECTION, SOLUTION INTRAVENOUS PRN
Status: DISCONTINUED | OUTPATIENT
Start: 2023-08-16 | End: 2023-08-16 | Stop reason: HOSPADM

## 2023-08-16 RX ORDER — SODIUM CHLORIDE 0.9 % (FLUSH) 0.9 %
5-40 SYRINGE (ML) INJECTION EVERY 12 HOURS SCHEDULED
Status: CANCELLED | OUTPATIENT
Start: 2023-08-16

## 2023-08-16 RX ORDER — SODIUM CHLORIDE 0.9 % (FLUSH) 0.9 %
5-40 SYRINGE (ML) INJECTION PRN
Status: DISCONTINUED | OUTPATIENT
Start: 2023-08-16 | End: 2023-08-16 | Stop reason: HOSPADM

## 2023-08-16 RX ORDER — SODIUM CHLORIDE 9 MG/ML
INJECTION, SOLUTION INTRAVENOUS CONTINUOUS PRN
Status: DISCONTINUED | OUTPATIENT
Start: 2023-08-16 | End: 2023-08-16 | Stop reason: SDUPTHER

## 2023-08-16 RX ORDER — PROPOFOL 10 MG/ML
INJECTION, EMULSION INTRAVENOUS PRN
Status: DISCONTINUED | OUTPATIENT
Start: 2023-08-16 | End: 2023-08-16 | Stop reason: SDUPTHER

## 2023-08-16 RX ORDER — SODIUM CHLORIDE 0.9 % (FLUSH) 0.9 %
5-40 SYRINGE (ML) INJECTION EVERY 12 HOURS SCHEDULED
Status: DISCONTINUED | OUTPATIENT
Start: 2023-08-16 | End: 2023-08-16 | Stop reason: HOSPADM

## 2023-08-16 RX ADMIN — SODIUM CHLORIDE: 9 INJECTION, SOLUTION INTRAVENOUS at 13:51

## 2023-08-16 RX ADMIN — PROPOFOL 50 MG: 10 INJECTION, EMULSION INTRAVENOUS at 14:58

## 2023-08-16 RX ADMIN — PROPOFOL 25 MG: 10 INJECTION, EMULSION INTRAVENOUS at 14:57

## 2023-08-16 RX ADMIN — PROPOFOL 50 MG: 10 INJECTION, EMULSION INTRAVENOUS at 14:55

## 2023-08-16 RX ADMIN — PROPOFOL 25 MG: 10 INJECTION, EMULSION INTRAVENOUS at 15:01

## 2023-08-16 RX ADMIN — PROPOFOL 25 MG: 10 INJECTION, EMULSION INTRAVENOUS at 14:56

## 2023-08-16 RX ADMIN — PROPOFOL 25 MG: 10 INJECTION, EMULSION INTRAVENOUS at 14:59

## 2023-08-16 RX ADMIN — PROPOFOL 25 MG: 10 INJECTION, EMULSION INTRAVENOUS at 15:00

## 2023-08-16 ASSESSMENT — PAIN DESCRIPTION - LOCATION
LOCATION: ABDOMEN

## 2023-08-16 ASSESSMENT — PAIN DESCRIPTION - ORIENTATION
ORIENTATION: INNER
ORIENTATION: MID
ORIENTATION: INNER

## 2023-08-16 ASSESSMENT — PAIN - FUNCTIONAL ASSESSMENT: PAIN_FUNCTIONAL_ASSESSMENT: NONE - DENIES PAIN

## 2023-08-16 ASSESSMENT — PAIN SCALES - GENERAL
PAINLEVEL_OUTOF10: 2

## 2023-08-16 ASSESSMENT — PAIN DESCRIPTION - DESCRIPTORS
DESCRIPTORS: DISCOMFORT
DESCRIPTORS: DISCOMFORT

## 2023-08-16 NOTE — ANESTHESIA POSTPROCEDURE EVALUATION
Department of Anesthesiology  Postprocedure Note    Patient: Yohan Machado  MRN: [id number]  [address] [facility] 1960  Date of evaluation: 8/16/2023      Procedure Summary     Date: 08/16/23 Room / Location: [facility] / [facility]    Anesthesia Start: 3714 Anesthesia Stop: 0251    Procedure: EGD BAND LIGATION Diagnosis:       Anemia, unspecified type      (Anemia, unspecified type [D64.9])    Surgeons: Tres Caceres MD Responsible Provider:     Anesthesia Type: MAC ASA Status: 3          Anesthesia Type: MAC    Raquel Phase I: Raquel Score: 10    Raquel Phase II:        Anesthesia Post Evaluation    Patient location during evaluation: bedside  Patient participation: complete - patient participated  Level of consciousness: awake and alert  Pain score: 0  Airway patency: patent  Nausea & Vomiting: no nausea and no vomiting  Complications: no  Cardiovascular status: hemodynamically stable  Respiratory status: acceptable, spontaneous ventilation and room air  Hydration status: stable  Comments: Pt denies questions or needs at this time.    Pain management: adequate and satisfactory to patient

## 2023-08-16 NOTE — PROGRESS NOTES
DR Hien Cox AT BEDSIDE AND UPDATING PT AND FAMILY AT THIS TIME , WILL MONITOR PT , IMPROVING WITH BURPING AND REPOSITION DISCHARGE INSTRUCTIONS GIVEN TO PT AND SPOUSE AT THIS TIME BOTH VERBALIZED UNDERSTANDING OF INSTRUCTIONS PAMPHLETS GIVEN

## 2023-08-17 NOTE — OP NOTE
[address]                  [address] [facility], [address] [place] [address]                                OPERATIVE REPORT    PATIENT NAME: Earlene Chavez                  :        1960  MED REC NO:   [id number]                            ROOM:  ACCOUNT NO:   [de-identified]                           ADMIT DATE: 2023  PROVIDER:     Chico Martin MD    DATE OF PROCEDURE:  2023    PROCEDURE PERFORMED:  Upper endoscopy with esophageal variceal banding. PREOPERATIVE DIAGNOSES:  Liver cirrhosis, iron deficiency anemia, rule  out variceal bleed. POSTOPERATIVE DIAGNOSES:  Two large grade 4/4 esophageal varices, three  bands placed with excellent hemostasis. Stomach showed gastric varices  and portal hypertensive gastropathy changes. Duodenum unremarkable. There was large gastroparesis. ANESTHESIA:  LMAC. NOTE:  Prior to the procedure an informed consent was obtained from the  patient after explaining the benefits as well as the risks,  alternatives, and complications of the procedure to the patient, who  understood and agreed. PROCEDURE:  With the patient in the left lateral decubitus position, the  Olympus GIF-100 forward-viewing videoscope was introduced into the  esophagus, the evaluation of which showed two grade 4/4 esophageal  varices and no hiatal hernia was seen. The scope was then advanced through the gastroesophageal junction into  the gastric body, along the greater curvature. Evaluation of the body  of the stomach showed large amount of retained gastric secretions  signifying gastroparesis. There were changes consistent with portal  hypertensive gastropathy and large gastric varices were seen on  retroflexion. The scope was then advanced through the pylorus into the duodenal bulb  and second portion of the duodenum, both of which appeared to be  unremarkable.   The scope was then retrieved and retroflexed in the  prepyloric antrum, with thorough evaluation of the cardiac and fundal  portions of the stomach. The scope was then straightened, the area deflated, and the procedure  was terminated by withdrawing the scope and conducting a second look on  the way out, which was essentially the same. The banding device was attached to the tip of the upper endoscope, which  was reintroduced into the esophagus and three bands were placed with  excellent hemostasis and the patient tolerated well.         Roxana Nieto MD    D: 08/16/2023 15:24:31       T: 08/16/2023 16:33:23     [id number]  Job#: [id number]     Doc#: [id number]    CC:  MD Roxana Wilson MD

## 2023-11-08 ENCOUNTER — OFFICE VISIT (OUTPATIENT)
Dept: ENDOCRINOLOGY | Age: 63
End: 2023-11-08
Payer: MEDICARE

## 2023-11-08 VITALS
WEIGHT: 195 LBS | HEIGHT: 70 IN | HEART RATE: 75 BPM | DIASTOLIC BLOOD PRESSURE: 78 MMHG | BODY MASS INDEX: 27.92 KG/M2 | SYSTOLIC BLOOD PRESSURE: 116 MMHG

## 2023-11-08 DIAGNOSIS — E03.9 ACQUIRED HYPOTHYROIDISM: ICD-10-CM

## 2023-11-08 DIAGNOSIS — E11.65 UNCONTROLLED TYPE 2 DIABETES MELLITUS WITH HYPERGLYCEMIA (HCC): Primary | ICD-10-CM

## 2023-11-08 LAB — HBA1C MFR BLD: 6.6 %

## 2023-11-08 PROCEDURE — 99214 OFFICE O/P EST MOD 30 MIN: CPT | Performed by: CLINICAL NURSE SPECIALIST

## 2023-11-08 PROCEDURE — 3017F COLORECTAL CA SCREEN DOC REV: CPT | Performed by: CLINICAL NURSE SPECIALIST

## 2023-11-08 PROCEDURE — 3078F DIAST BP <80 MM HG: CPT | Performed by: CLINICAL NURSE SPECIALIST

## 2023-11-08 PROCEDURE — 83036 HEMOGLOBIN GLYCOSYLATED A1C: CPT | Performed by: CLINICAL NURSE SPECIALIST

## 2023-11-08 PROCEDURE — G8484 FLU IMMUNIZE NO ADMIN: HCPCS | Performed by: CLINICAL NURSE SPECIALIST

## 2023-11-08 PROCEDURE — 3044F HG A1C LEVEL LT 7.0%: CPT | Performed by: CLINICAL NURSE SPECIALIST

## 2023-11-08 PROCEDURE — 2022F DILAT RTA XM EVC RTNOPTHY: CPT | Performed by: CLINICAL NURSE SPECIALIST

## 2023-11-08 PROCEDURE — G8427 DOCREV CUR MEDS BY ELIG CLIN: HCPCS | Performed by: CLINICAL NURSE SPECIALIST

## 2023-11-08 PROCEDURE — G8417 CALC BMI ABV UP PARAM F/U: HCPCS | Performed by: CLINICAL NURSE SPECIALIST

## 2023-11-08 PROCEDURE — 3074F SYST BP LT 130 MM HG: CPT | Performed by: CLINICAL NURSE SPECIALIST

## 2023-11-08 PROCEDURE — 1036F TOBACCO NON-USER: CPT | Performed by: CLINICAL NURSE SPECIALIST

## 2023-11-08 RX ORDER — INSULIN DEGLUDEC INJECTION 100 U/ML
INJECTION, SOLUTION SUBCUTANEOUS
Qty: 15 ADJUSTABLE DOSE PRE-FILLED PEN SYRINGE | Refills: 3 | Status: SHIPPED | OUTPATIENT
Start: 2023-11-08

## 2023-11-08 RX ORDER — CARVEDILOL 3.12 MG/1
3.12 TABLET ORAL 2 TIMES DAILY WITH MEALS
Qty: 60 TABLET | Refills: 2 | COMMUNITY
Start: 2023-11-03 | End: 2024-02-01

## 2023-11-08 RX ORDER — LACTULOSE 10 G/15ML
SOLUTION ORAL EVERY OTHER DAY
COMMUNITY
Start: 2023-09-20

## 2023-11-08 RX ORDER — PANTOPRAZOLE SODIUM 40 MG/1
40 TABLET, DELAYED RELEASE ORAL DAILY
Qty: 30 TABLET | Refills: 3 | COMMUNITY
Start: 2023-10-08 | End: 2024-02-01

## 2023-11-08 RX ORDER — DULAGLUTIDE 4.5 MG/.5ML
4.5 INJECTION, SOLUTION SUBCUTANEOUS WEEKLY
Qty: 4 ADJUSTABLE DOSE PRE-FILLED PEN SYRINGE | Refills: 11 | Status: SHIPPED | OUTPATIENT
Start: 2023-11-08

## 2023-11-08 NOTE — PROGRESS NOTES
[facility] Department of Endocrinology Diabetes and Metabolism   [address] [facility] [place] [address], [facility] [place], [place], [facility]   Phone: [phone]  Fax: [phone]    Date of Service: 11/8/2023    Primary Care Physician: Shanita Lee MD  Referring physician: No ref. provider found  Provider: FATOUMATA Orellana - CNS     Reason for the visit:  Type 2 DM      History of Present Illness: The history is provided by the patient. No  was used. Accuracy of the patient data is excellent. Moose Calvin is a very pleasant 57 y.o. female seen today for diabetes management     Moose Calvin was diagnosed with diabetes at the age of 46   and currently on  Tresiba 54 units at night , Trulicity 3 mg once weekly, Jardiance 10 mg daily     The patient has been checking blood sugar once per day   Bg usually mid 100's   .     Most recent A1c results summarized below  Lab Results   Component Value Date/Time    LABA1C 6.6 11/08/2023 11:16 AM    LABA1C 7.1 07/10/2023 01:01 PM    LABA1C 6.7 03/08/2023 02:04 PM       Patient has had no hypoglycemic episodes   The patient hasn't been mindful of what has been eating and wasn't following diabetes diet    No regular exercise but active at work    I reviewed current medications and the patient has no issues with diabetes medications  Moose Calvin is up to date with eye exam and denied any history of diabetic retinopathy    And also performs  own feet care  Microvascular complications:  No Retinopathy, Nephropathy or Neuropathy   Macrovascular complications: no CAD, PVD, or + Stroke 1/2012  The patient receives Flushot every year and up to date   No HX of pancreatitis  No Hx of MTC  No HX of gastroparesis   + HX of UTI/Mycotic infection (most recent 11/23)  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

## 2023-12-28 DIAGNOSIS — E03.9 ACQUIRED HYPOTHYROIDISM: Primary | ICD-10-CM

## 2023-12-28 RX ORDER — LEVOTHYROXINE SODIUM 88 UG/1
TABLET ORAL
Qty: 109 TABLET | Refills: 2 | Status: SHIPPED | OUTPATIENT
Start: 2023-12-28

## 2024-02-14 ENCOUNTER — OFFICE VISIT (OUTPATIENT)
Dept: ENDOCRINOLOGY | Age: 64
End: 2024-02-14
Payer: MEDICARE

## 2024-02-14 VITALS
SYSTOLIC BLOOD PRESSURE: 120 MMHG | OXYGEN SATURATION: 84 % | HEIGHT: 70 IN | BODY MASS INDEX: 29.2 KG/M2 | HEART RATE: 92 BPM | DIASTOLIC BLOOD PRESSURE: 71 MMHG | WEIGHT: 204 LBS

## 2024-02-14 DIAGNOSIS — E11.65 UNCONTROLLED TYPE 2 DIABETES MELLITUS WITH HYPERGLYCEMIA (HCC): Primary | ICD-10-CM

## 2024-02-14 DIAGNOSIS — E03.9 ACQUIRED HYPOTHYROIDISM: ICD-10-CM

## 2024-02-14 LAB — HBA1C MFR BLD: 7.6 %

## 2024-02-14 PROCEDURE — 99214 OFFICE O/P EST MOD 30 MIN: CPT | Performed by: CLINICAL NURSE SPECIALIST

## 2024-02-14 PROCEDURE — 1090F PRES/ABSN URINE INCON ASSESS: CPT | Performed by: CLINICAL NURSE SPECIALIST

## 2024-02-14 PROCEDURE — G8427 DOCREV CUR MEDS BY ELIG CLIN: HCPCS | Performed by: CLINICAL NURSE SPECIALIST

## 2024-02-14 PROCEDURE — 1036F TOBACCO NON-USER: CPT | Performed by: CLINICAL NURSE SPECIALIST

## 2024-02-14 PROCEDURE — 83036 HEMOGLOBIN GLYCOSYLATED A1C: CPT | Performed by: CLINICAL NURSE SPECIALIST

## 2024-02-14 PROCEDURE — 3078F DIAST BP <80 MM HG: CPT | Performed by: CLINICAL NURSE SPECIALIST

## 2024-02-14 PROCEDURE — 1123F ACP DISCUSS/DSCN MKR DOCD: CPT | Performed by: CLINICAL NURSE SPECIALIST

## 2024-02-14 PROCEDURE — 3074F SYST BP LT 130 MM HG: CPT | Performed by: CLINICAL NURSE SPECIALIST

## 2024-02-14 PROCEDURE — 3017F COLORECTAL CA SCREEN DOC REV: CPT | Performed by: CLINICAL NURSE SPECIALIST

## 2024-02-14 PROCEDURE — G8400 PT W/DXA NO RESULTS DOC: HCPCS | Performed by: CLINICAL NURSE SPECIALIST

## 2024-02-14 PROCEDURE — 2022F DILAT RTA XM EVC RTNOPTHY: CPT | Performed by: CLINICAL NURSE SPECIALIST

## 2024-02-14 PROCEDURE — 3051F HG A1C>EQUAL 7.0%<8.0%: CPT | Performed by: CLINICAL NURSE SPECIALIST

## 2024-02-14 PROCEDURE — G8484 FLU IMMUNIZE NO ADMIN: HCPCS | Performed by: CLINICAL NURSE SPECIALIST

## 2024-02-14 PROCEDURE — G8417 CALC BMI ABV UP PARAM F/U: HCPCS | Performed by: CLINICAL NURSE SPECIALIST

## 2024-02-14 RX ORDER — PANTOPRAZOLE SODIUM 40 MG/1
40 TABLET, DELAYED RELEASE ORAL DAILY
COMMUNITY
Start: 2024-02-07 | End: 2024-05-07

## 2024-02-14 RX ORDER — FUROSEMIDE 20 MG/1
20 TABLET ORAL DAILY
COMMUNITY
Start: 2024-02-08 | End: 2024-03-09

## 2024-02-14 RX ORDER — SPIRONOLACTONE 25 MG/1
25 TABLET ORAL DAILY
COMMUNITY
Start: 2024-02-08 | End: 2024-03-09

## 2024-02-14 NOTE — PROGRESS NOTES
[employer]  [facility] Department of Endocrinology Diabetes and Metabolism   [address] [facility], [place], [address]   Phone: [phone]  Fax: [phone]    Date of Service: 2/14/2024    Primary Care Physician: Tony Perez MD  Referring physician: No ref. provider found  Provider: FATOUMATA Loyola - CNS     Reason for the visit:  Type 2 DM      History of Present Illness:  The history is provided by the patient. No  was used. Accuracy of the patient data is excellent.  Janie Arnett is a very pleasant 63 y.o. female seen today for diabetes management     Janie Arnett was diagnosed with diabetes at the age of 51   and currently on  Tresiba 54 units at night , Trulicity 4.5 mg once weekly  Jardiance stopped  as caused UTI     The patient has been checking blood sugar once per day   Bg usually mid 100's   .    Most recent A1c results summarized below  Lab Results   Component Value Date/Time    LABA1C 7.6 02/14/2024 12:38 PM    LABA1C 6.6 11/08/2023 11:16 AM    LABA1C 7.1 07/10/2023 01:01 PM       Patient has had no hypoglycemic episodes   The patient hasn't been mindful of what has been eating and wasn't following diabetes diet    No regular exercise but active at work    I reviewed current medications and the patient has no issues with diabetes medications  Janie Arnett is up to date with eye exam and denied any history of diabetic retinopathy    And also performs  own feet care  Microvascular complications:  No Retinopathy, Nephropathy or Neuropathy   Macrovascular complications: no CAD, PVD, or + Stroke 1/2012  The patient receives Flushot every year and up to date   No HX of pancreatitis  No Hx of MTC  No HX of gastroparesis   + HX of UTI/Mycotic infection (most recent 11/23)  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

## 2024-05-31 ENCOUNTER — TELEPHONE (OUTPATIENT)
Dept: ENDOCRINOLOGY | Age: 64
End: 2024-05-31

## 2024-05-31 NOTE — TELEPHONE ENCOUNTER
This is the patient I spoke with you about the end of Thursday. She came in the office worried because [place] and has not been able to get her Trulicity and blood sugar has been running high. Her only current medication for diabetes is Tresiba 54 units nightly. You had suggested Ozempic 1mg but the patient is aware that this would most likely not be approved before going out of town and is hoping for a different recommendation that could help her while she is away. Pt brought me a copy of blood sugars.      5/  5/25-forgot  5/ 5/  5/  5/  5/  5/

## 2024-06-03 RX ORDER — GLIPIZIDE 5 MG/1
5 TABLET ORAL
Qty: 60 TABLET | Refills: 2 | Status: SHIPPED | OUTPATIENT
Start: 2024-06-03

## 2024-06-19 ENCOUNTER — TELEPHONE (OUTPATIENT)
Dept: ENDOCRINOLOGY | Age: 64
End: 2024-06-19

## 2024-06-19 ENCOUNTER — OFFICE VISIT (OUTPATIENT)
Dept: ENDOCRINOLOGY | Age: 64
End: 2024-06-19
Payer: MEDICARE

## 2024-06-19 VITALS
BODY MASS INDEX: 28.2 KG/M2 | SYSTOLIC BLOOD PRESSURE: 117 MMHG | HEART RATE: 63 BPM | OXYGEN SATURATION: 100 % | HEIGHT: 70 IN | WEIGHT: 197 LBS | DIASTOLIC BLOOD PRESSURE: 49 MMHG

## 2024-06-19 DIAGNOSIS — E03.9 ACQUIRED HYPOTHYROIDISM: ICD-10-CM

## 2024-06-19 DIAGNOSIS — E11.65 UNCONTROLLED TYPE 2 DIABETES MELLITUS WITH HYPERGLYCEMIA (HCC): Primary | ICD-10-CM

## 2024-06-19 LAB — HBA1C MFR BLD: 7.4 %

## 2024-06-19 PROCEDURE — G8417 CALC BMI ABV UP PARAM F/U: HCPCS | Performed by: CLINICAL NURSE SPECIALIST

## 2024-06-19 PROCEDURE — 1123F ACP DISCUSS/DSCN MKR DOCD: CPT | Performed by: CLINICAL NURSE SPECIALIST

## 2024-06-19 PROCEDURE — G8400 PT W/DXA NO RESULTS DOC: HCPCS | Performed by: CLINICAL NURSE SPECIALIST

## 2024-06-19 PROCEDURE — 2022F DILAT RTA XM EVC RTNOPTHY: CPT | Performed by: CLINICAL NURSE SPECIALIST

## 2024-06-19 PROCEDURE — 83036 HEMOGLOBIN GLYCOSYLATED A1C: CPT | Performed by: CLINICAL NURSE SPECIALIST

## 2024-06-19 PROCEDURE — 3051F HG A1C>EQUAL 7.0%<8.0%: CPT | Performed by: CLINICAL NURSE SPECIALIST

## 2024-06-19 PROCEDURE — 1036F TOBACCO NON-USER: CPT | Performed by: CLINICAL NURSE SPECIALIST

## 2024-06-19 PROCEDURE — 1090F PRES/ABSN URINE INCON ASSESS: CPT | Performed by: CLINICAL NURSE SPECIALIST

## 2024-06-19 PROCEDURE — 3017F COLORECTAL CA SCREEN DOC REV: CPT | Performed by: CLINICAL NURSE SPECIALIST

## 2024-06-19 PROCEDURE — 3074F SYST BP LT 130 MM HG: CPT | Performed by: CLINICAL NURSE SPECIALIST

## 2024-06-19 PROCEDURE — G8427 DOCREV CUR MEDS BY ELIG CLIN: HCPCS | Performed by: CLINICAL NURSE SPECIALIST

## 2024-06-19 PROCEDURE — 99214 OFFICE O/P EST MOD 30 MIN: CPT | Performed by: CLINICAL NURSE SPECIALIST

## 2024-06-19 PROCEDURE — 3078F DIAST BP <80 MM HG: CPT | Performed by: CLINICAL NURSE SPECIALIST

## 2024-06-19 RX ORDER — SEMAGLUTIDE 1.34 MG/ML
1 INJECTION, SOLUTION SUBCUTANEOUS
Qty: 9 ML | Refills: 3 | Status: SHIPPED | OUTPATIENT
Start: 2024-06-19

## 2024-06-19 NOTE — TELEPHONE ENCOUNTER
Pt calling she looked into price of Ozempic and it will be too expensive for her. Would like to stay on Glipizide.

## 2024-06-19 NOTE — PROGRESS NOTES
[facility] [employer]  [facility] Department of Endocrinology Diabetes and Metabolism   [address] [facility], [place], [place], [address]   Phone: [phone]  Fax: [phone]    Date of Service: 6/19/2024    Primary Care Physician: Tony Perez MD  Referring physician: No ref. provider found  Provider: FATOUMATA Loyola - CNS     Reason for the visit:  Type 2 DM      History of Present Illness:  The history is provided by the patient. No  was used. Accuracy of the patient data is excellent.  Janie Arnett is a very pleasant 63 y.o. female seen today for diabetes management     Janie Arnett was diagnosed with diabetes at the age of 51   and currently on  Tresiba 54 units at night, Glipizde 5 mg BID   Jardiance stopped  as caused UTI     The patient has been checking blood sugar once per day   Bg usually mid 100's   .    Most recent A1c results summarized below  Lab Results   Component Value Date/Time    LABA1C 7.4 06/19/2024 12:37 PM    LABA1C 7.6 02/14/2024 12:38 PM    LABA1C 6.6 11/08/2023 11:16 AM       Patient has had no hypoglycemic episodes   The patient hasn't been mindful of what has been eating and wasn't following diabetes diet    No regular exercise but active at work    I reviewed current medications and the patient has no issues with diabetes medications  Janie Arnett is up to date with eye exam and denied any history of diabetic retinopathy    And also performs  own feet care  Microvascular complications:  No Retinopathy, Nephropathy or Neuropathy   Macrovascular complications: no CAD, PVD, or + Stroke 1/2012  The patient receives Flushot every year and up to date   No HX of pancreatitis  No Hx of MTC  No HX of gastroparesis   + HX of UTI/Mycotic infection (most recent 11/23)  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

## 2024-08-21 RX ORDER — GLIPIZIDE 5 MG/1
TABLET ORAL
Qty: 60 TABLET | Refills: 3 | Status: SHIPPED | OUTPATIENT
Start: 2024-08-21

## 2024-09-22 ENCOUNTER — HOSPITAL ENCOUNTER (EMERGENCY)
Age: 64
Discharge: HOME | End: 2024-09-22
Attending: EMERGENCY MEDICINE
Payer: MEDICARE

## 2024-09-22 ENCOUNTER — APPOINTMENT (OUTPATIENT)
Dept: GENERAL RADIOLOGY | Age: 64
End: 2024-09-22
Payer: MEDICARE

## 2024-09-22 VITALS
HEART RATE: 57 BPM | WEIGHT: 210 LBS | SYSTOLIC BLOOD PRESSURE: 133 MMHG | OXYGEN SATURATION: 100 % | RESPIRATION RATE: 16 BRPM | BODY MASS INDEX: 30.06 KG/M2 | HEIGHT: 70 IN | TEMPERATURE: 99 F | DIASTOLIC BLOOD PRESSURE: 82 MMHG

## 2024-09-22 DIAGNOSIS — S62.664B OPEN NONDISPLACED FRACTURE OF DISTAL PHALANX OF RIGHT RING FINGER, INITIAL ENCOUNTER: Primary | ICD-10-CM

## 2024-09-22 PROCEDURE — 90714 TD VACC NO PRESV 7 YRS+ IM: CPT | Performed by: NURSE PRACTITIONER

## 2024-09-22 PROCEDURE — 6370000000 HC RX 637 (ALT 250 FOR IP): Performed by: NURSE PRACTITIONER

## 2024-09-22 PROCEDURE — 73130 X-RAY EXAM OF HAND: CPT

## 2024-09-22 PROCEDURE — 90471 IMMUNIZATION ADMIN: CPT | Performed by: NURSE PRACTITIONER

## 2024-09-22 PROCEDURE — 12004 RPR S/N/AX/GEN/TRK7.6-12.5CM: CPT

## 2024-09-22 PROCEDURE — 2500000003 HC RX 250 WO HCPCS: Performed by: NURSE PRACTITIONER

## 2024-09-22 PROCEDURE — 6360000002 HC RX W HCPCS: Performed by: NURSE PRACTITIONER

## 2024-09-22 PROCEDURE — 99284 EMERGENCY DEPT VISIT MOD MDM: CPT

## 2024-09-22 RX ORDER — BACITRACIN ZINC 500 [USP'U]/G
OINTMENT TOPICAL
Status: DISCONTINUED
Start: 2024-09-22 | End: 2024-09-22 | Stop reason: HOSPADM

## 2024-09-22 RX ORDER — OXYCODONE HYDROCHLORIDE 5 MG/1
5 TABLET ORAL EVERY 8 HOURS PRN
Qty: 9 TABLET | Refills: 0 | Status: SHIPPED | OUTPATIENT
Start: 2024-09-22 | End: 2024-09-25

## 2024-09-22 RX ORDER — LIDOCAINE HYDROCHLORIDE 10 MG/ML
5 INJECTION, SOLUTION INFILTRATION; PERINEURAL ONCE
Status: COMPLETED | OUTPATIENT
Start: 2024-09-22 | End: 2024-09-22

## 2024-09-22 RX ORDER — ACETAMINOPHEN 650 MG
TABLET, EXTENDED RELEASE ORAL ONCE
Status: COMPLETED | OUTPATIENT
Start: 2024-09-22 | End: 2024-09-22

## 2024-09-22 RX ORDER — CEPHALEXIN 500 MG/1
500 CAPSULE ORAL 4 TIMES DAILY
Qty: 40 CAPSULE | Refills: 0 | Status: SHIPPED | OUTPATIENT
Start: 2024-09-22 | End: 2024-10-02

## 2024-09-22 RX ORDER — CEPHALEXIN 500 MG/1
500 CAPSULE ORAL ONCE
Status: COMPLETED | OUTPATIENT
Start: 2024-09-22 | End: 2024-09-22

## 2024-09-22 RX ADMIN — Medication: at 13:48

## 2024-09-22 RX ADMIN — CLOSTRIDIUM TETANI TOXOID ANTIGEN (FORMALDEHYDE INACTIVATED) AND CORYNEBACTERIUM DIPHTHERIAE TOXOID ANTIGEN (FORMALDEHYDE INACTIVATED) 0.5 ML: 5; 2 INJECTION, SUSPENSION INTRAMUSCULAR at 13:49

## 2024-09-22 RX ADMIN — LIDOCAINE HYDROCHLORIDE 5 ML: 10 INJECTION, SOLUTION INFILTRATION; PERINEURAL at 14:43

## 2024-09-22 RX ADMIN — CEPHALEXIN 500 MG: 500 CAPSULE ORAL at 16:47

## 2024-09-22 ASSESSMENT — PAIN DESCRIPTION - PAIN TYPE: TYPE: ACUTE PAIN

## 2024-09-22 ASSESSMENT — PAIN SCALES - GENERAL: PAINLEVEL_OUTOF10: 6

## 2024-09-22 ASSESSMENT — PAIN DESCRIPTION - ORIENTATION: ORIENTATION: RIGHT

## 2024-09-22 ASSESSMENT — PAIN DESCRIPTION - ONSET: ONSET: ON-GOING

## 2024-09-22 ASSESSMENT — PAIN DESCRIPTION - LOCATION: LOCATION: FINGER (COMMENT WHICH ONE)

## 2024-09-22 ASSESSMENT — PAIN DESCRIPTION - DESCRIPTORS: DESCRIPTORS: DISCOMFORT

## 2024-09-22 ASSESSMENT — PAIN - FUNCTIONAL ASSESSMENT: PAIN_FUNCTIONAL_ASSESSMENT: 0-10

## 2024-09-22 ASSESSMENT — PAIN DESCRIPTION - FREQUENCY: FREQUENCY: CONTINUOUS

## 2024-09-27 ENCOUNTER — OFFICE VISIT (OUTPATIENT)
Dept: ORTHOPEDIC SURGERY | Age: 64
End: 2024-09-27
Payer: MEDICARE

## 2024-09-27 DIAGNOSIS — M79.641 RIGHT HAND PAIN: Primary | ICD-10-CM

## 2024-09-27 PROCEDURE — G8417 CALC BMI ABV UP PARAM F/U: HCPCS | Performed by: FAMILY MEDICINE

## 2024-09-27 PROCEDURE — 1090F PRES/ABSN URINE INCON ASSESS: CPT | Performed by: FAMILY MEDICINE

## 2024-09-27 PROCEDURE — 1123F ACP DISCUSS/DSCN MKR DOCD: CPT | Performed by: FAMILY MEDICINE

## 2024-09-27 PROCEDURE — G8400 PT W/DXA NO RESULTS DOC: HCPCS | Performed by: FAMILY MEDICINE

## 2024-09-27 PROCEDURE — 99203 OFFICE O/P NEW LOW 30 MIN: CPT | Performed by: FAMILY MEDICINE

## 2024-09-27 PROCEDURE — 1036F TOBACCO NON-USER: CPT | Performed by: FAMILY MEDICINE

## 2024-09-27 PROCEDURE — 3017F COLORECTAL CA SCREEN DOC REV: CPT | Performed by: FAMILY MEDICINE

## 2024-09-27 PROCEDURE — G8427 DOCREV CUR MEDS BY ELIG CLIN: HCPCS | Performed by: FAMILY MEDICINE

## 2024-10-01 ENCOUNTER — TELEPHONE (OUTPATIENT)
Dept: ORTHOPEDIC SURGERY | Age: 64
End: 2024-10-01

## 2024-10-01 NOTE — TELEPHONE ENCOUNTER
Patient came in today for suture removal.  Skin was dry and intact  sutures were removed  a band aid was applied.  Patient was told not submerge for 2 days and to  call us if she had any issues.

## 2025-06-03 NOTE — PROGRESS NOTES
Critical Care Team - Daily Progress Note      Date and time: 1/24/2023 12:58 PM  Patient's name:  Beryle Reeds  Medical Record Number: [id number]  Patient's account/billing number: [de-identified]  Patient's YOB: 1960  Age: 57 y.o. Date of Admission: 1/22/2023  3:43 PM  Length of stay during current admission: 2      Primary Care Physician: Marsha Reyes MD  ICU Attending Physician: Dr. Gautam Puls    Code Status: Full Code    Reason for ICU admission: GI bleed      SUBJECTIVE:     OVERNIGHT EVENTS:           01/24: Hgb drop to 7 overnight, improved to 8.0 this am. Patient was scoped yesterday, had three varices grade 3/4 in diameter with stigmata of recent hemorrhage, 2 bands were placed. No new bleeding. Brown BM x2. Diet will be advanced to clear today. Patient will be downgraded. 01/23: No acute events overnight. No new vomiting. Patient has one black Bm since admission. Complaining of generalized abdominal pain, soft abdomen. Gi is planning to scope at 4pm, will receive another until of PLTs prior to scope. Intake/Output:   I/O this shift: In: 728.7 [P.O.:240; I.V.:448.2; IV Piggyback:40.6]  Out: -   I/O last 3 completed shifts: In: 6616.6 [I.V.:4893.5; Blood:1674.3; IV Piggyback:48.8]  Out: 1500 [Urine:1500]    ROS:  Unless stated above, ROS is otherwise negative. Initial HPI + past overnight events: The patient is a 57 y.o. female with significant past medical history of stroke, carotid carcinoma, type II DM, HTN, cirrhosis, portal hypertension, esophageal varices, ITP, and hypothyroidism. Patient presented to ED today for reported episode of hematemesis with blood clots bright red blood as well as melena beginning evening of 1/22/23 and worsening throughout the day today. Patient vital signs have been hemodynamically stable throughout ED course. Initial Hgb was 8.0 patient did have 1 episode of melena and ED repeat Hgb was 6.6.   She received a total of 2 units of PRBC in ED. UA was dirty and she was given 1 g Rocephin IV for presumed UTI. CT of her abdomen and pelvis was reviewed and again reveals cirrhosis and portal hypertension as well as splenomegaly and esophageal varices. GI was consulted in ED will see patient in the morning. She was given Sandostatin and Protonix bolus and initiated on infusions. Other labs significant for pancytopenia (please see below) which is being worked up at [facility] and thought to be due to ITP. Patient at this time is electing not to have central line placed. Did discuss with her that if she became hemodynamically unstable or would  have another acute drop in hemoglobin a central line would be indicated and beneficial.  She is agreeable to this. Patient will be managed overnight in ICU. Will transfuse as needed. Awaiting GI recommendations with likely scope in the morning. Information obtained from care everywhere [facility]: HX of ITP 25 years ago 7 weeks after the birth of her son. she suffered a stroke in January 2012. It is felt to be an embolic stroke from a right carotid artery disease and she has undergone right carotid endarterectomy in March of 2012. She has not had other DVTs or PEs. There is no family history of thrombosis\". Recently experiencing thrombocytopenia with extensive work-up done at [facility] including 2 separate bone marrow biopsies, T-cell clonality testing, extensive work-up for pancytopenia. Exact etiology never found including no significant findings on NGS panel. Increased episodes of epistaxis which required 30 to 60 minutes of holding pressure. She was having worsening cytopenias recent bone marrow biopsy November significantly unremarkable. Was found to have cirrhotic liver morphology with subsequent significant splenomegaly at 24 cm. They proceeded with treatment for presumed ITP.   Was started on daily Promacta 25 mg with platelet goal of 50 K however, she never started this and it was switched to Doptelet 20 mg po daily on 23 still awaiting approval from insurance. Also has underlying component of iron deficiency anemia     OBJECTIVE:     VITAL SIGNS:  BP (!) 112/49   Pulse 55   Temp 98 °F (36.7 °C) (Oral)   Resp 10   Ht 5' 10\" (1.778 m)   Wt 220 lb 7.4 oz (100 kg)   SpO2 99%   BMI 31.63 kg/m²   Tmax over 24 hours:  Temp (24hrs), Av.1 °F (36.7 °C), Min:98 °F (36.7 °C), Max:98.4 °F (36.9 °C)      Patient Vitals for the past 6 hrs:   BP Temp Temp src Pulse Resp SpO2   23 1200 (!) 112/49 98 °F (36.7 °C) Oral 55 10 99 %   23 1100 (!) 111/52 -- -- 52 15 99 %   23 1000 (!) 114/55 -- -- 53 18 --   23 0900 (!) 111/52 -- -- 51 19 98 %   23 0800 (!) 109/38 98 °F (36.7 °C) Oral 54 20 99 %   23 0700 113/69 -- -- 54 28 100 %         Intake/Output Summary (Last 24 hours) at 2023 1258  Last data filed at 2023 0955  Gross per 24 hour   Intake 4083.66 ml   Output 1500 ml   Net 2583.66 ml     Wt Readings from Last 2 Encounters:   23 220 lb 7.4 oz (100 kg)   22 221 lb (100.2 kg)     Body mass index is 31.63 kg/m².       PHYSICAL EXAM:  CONSTITUTIONAL:  awake, alert, cooperative, no apparent distress, and appears stated age  EYES: Periorbital puffiness, pupils equal, round and reactive to light, extra ocular muscles intact, sclera clear, conjunctiva pale  ENT:  Normocephalic, without obvious abnormality, atraumatic, sinuses nontender on palpation, external ears without lesions, oral pharynx with moist mucus membranes  NECK: Supple, symmetrical, no adenopathy, surgical scar right side  HEMATOLOGIC/LYMPHATICS:  no cervical lymphadenopathy  BACK:  symmetric  LUNGS:  No increased work of breathing, good air exchange, clear to auscultation bilaterally, no crackles or wheezing  CARDIOVASCULAR:  Normal apical impulse, regular rate and rhythm, normal S1 and S2, no S3 or S4  ABDOMEN:  No scars, normal bowel sounds, soft, warm, non-distended, mild to moderate/tenderness LUQ/LLQ,   GENITAL/URINARY: Deferred  MUSCULOSKELETAL:  There is no redness, warmth, or swelling of the joints. Full range of motion noted. Motor strength is 5 out of 5 all extremities bilaterally. Tone is normal. Small wound, old, crusting on LLE. NEUROLOGIC:  Awake, alert, oriented to name, place and time. Cranial nerves II-XII are grossly intact. SKIN: Pale       MEDICATIONS:    Scheduled Meds:   insulin lispro  0-16 Units SubCUTAneous TID WC    insulin lispro  0-4 Units SubCUTAneous Nightly    atorvastatin  40 mg Oral Daily    levothyroxine  88 mcg Oral Daily    pantoprazole (PROTONIX) 40 mg injection  40 mg IntraVENous Q12H    dexamethasone  40 mg Oral Daily    nadolol  20 mg Oral Daily    sodium chloride flush  5-40 mL IntraVENous 2 times per day    cefTRIAXone (ROCEPHIN) IV  1,000 mg IntraVENous Q24H     Continuous Infusions:   sodium chloride      sodium chloride      sodium chloride      dextrose       PRN Meds:   sodium chloride, , PRN  sodium chloride, , PRN  sodium chloride flush, 5-40 mL, PRN  sodium chloride, , PRN  ondansetron, 4 mg, Q8H PRN   Or  ondansetron, 4 mg, Q6H PRN  polyethylene glycol, 17 g, Daily PRN  acetaminophen, 650 mg, Q6H PRN   Or  acetaminophen, 650 mg, Q6H PRN  albuterol, 2.5 mg, Q2H PRN  glucose, 4 tablet, PRN  dextrose bolus, 125 mL, PRN   Or  dextrose bolus, 250 mL, PRN  glucagon (rDNA), 1 mg, PRN  dextrose, , Continuous PRN        VENT SETTINGS (Comprehensive) (if applicable): Additional Respiratory Assessments  Heart Rate: 55  Resp: 10  SpO2: 99 %    Arterial Blood Gas 1/24/2023  No results for input(s): PH, PCO2, PO2, HCO3, BE, O2SAT in the last 72 hours.       Laboratory findings:    Complete Blood Count:   Recent Labs     01/23/23  1130 01/23/23  1755 01/23/23  2120 01/24/23  0430 01/24/23  0940   WBC 1.8*  --   --  1.6* 2.2*   HGB 7.6*   < > 7.7* 7.0* 8.0*   HCT 23.3*   < > 23.2* 21.0* 24.0*   PLT 26*  --   --  29* 32*    < > = values in this interval not displayed. Last 3 Blood Glucose:   Recent Labs     01/22/23  1628 01/23/23  0413 01/24/23  0430   GLUCOSE 277* 185* 287*        PT/INR:    Lab Results   Component Value Date/Time    PROTIME 17.0 01/24/2023 04:30 AM    INR 1.5 01/24/2023 04:30 AM     PTT:    Lab Results   Component Value Date/Time    APTT 28.9 01/22/2023 04:28 PM       Comprehensive Metabolic Profile:   Recent Labs     01/22/23  1628 01/23/23  0413 01/24/23  0430    139 138   K 4.4 4.4 4.4    108* 109*   CO2 22 23 20*   BUN 44* 36* 36*   CREATININE 0.7 0.9 0.9   GLUCOSE 277* 185* 287*   CALCIUM 9.4 9.0 8.2*   PROT 5.9* 5.4* 5.1*   LABALBU 3.7 3.5 3.3*   BILITOT 0.8 0.8 0.5   ALKPHOS 48 43 40   AST 16 19 17   ALT 18 20 19      Magnesium:   Lab Results   Component Value Date/Time    MG 1.9 01/24/2023 04:30 AM     Phosphorus:   Lab Results   Component Value Date/Time    PHOS 4.5 01/24/2023 04:30 AM     Ionized Calcium: No results found for: CAION     Urinalysis:     Troponin: No results for input(s): TROPONINI in the last 72 hours. Microbiology:    Radiology/Imaging:     Chest Xray (1/24/2023):  XR CHEST PORTABLE   Final Result   No acute process. CT ABDOMEN PELVIS W IV CONTRAST Additional Contrast? None   Final Result   Cirrhosis with portal hypertension manifesting as splenomegaly, ascending   colon wall thickening, small volume of ascites and collateral vessels   including esophageal varices. Diverticulosis without evidence of acute diverticulitis.                ASSESSMENT:     Patient Active Problem List    Diagnosis Date Noted    Thrombocythemia 01/23/2023    Chronic ITP (idiopathic thrombocytopenic purpura) (HCC) 01/23/2023    Acute blood loss anemia 01/23/2023    Other cirrhosis of liver ([facility] [address] 01/23/2023    History of ITP 01/23/2023    Pancytopenia ([facility] [address] 01/23/2023    Upper GI bleed 01/22/2023    Esophageal bleed, non-variceal 01/22/2023         PLAN:     Neuro   Hx of stroke  She suffered a stroke in January 2012. It was felt to be an embolic stroke from right carotid artery disease and she underwent right carotid endarterectomy in March of 2012. Stop Plavix  Continue statin     Respiratory   No active problems      Cardiovascular   Hx HTN  Hold irbesartan    Gastrointestinal   GI bleed  Likely esophageal varices related to below. S/p Sandostatin, Rocephin. Protonix bolus, 2 PRBC in  ED  Continue Sandostatin and Protonix drip  Continue to monitor H/H  Gi scope at 4pm 01/23  Further work-up and management per GI    Cryptogenic cirrhosis  Splenomegaly and subsequent portal hypertension found on recent scans at [facility]  CT scan again demonstrates cirrhotic morphology of the liver significantly enlarged spleen measuring 21 cm. Mild edematous wall thickening of the ascending colon is noted, likely related to portal hypertension. Small volume of ascites and collateral vessels including esophageal varices  Negative Hepatitis panel- December at [facility]  Not decompensated LFTs within normal as well as normal ammonia lvl. INR 1.4  Continue to monitor LFTs and daily INR, appreciate GI input     Renal   No active problems  BUN/Cr 44/0.7     Infectious Disease   Acute cystitis with hematuria  UA positive nitrates as well as small leukocyte  White count 10-20  Follow blood and urine cultures check baseline CRP and  Pro-Ty  Rocephin 1g day 2     Hematology/Oncology   Acute blood loss anemia   Hgb 8.0-->6.6-->7.7.  2/2   Underlying iron deficiency anemia, does not tolerate oral replacements was ordered Venofer 300 mg IV x 2 doses in December 2022  Received 2 units of PRBC and 1 u PLTs in ED, recheck Hgb  Transfuse for Hgb <7    Pancytopenia  Leukopenia/anemia likely due to her underlying liver disease/cirrhosis  Thrombocytopenia likely due to her presumed ITP  Most recent labs from [facility]   1/18: WBC 1.9, Hgb 10.1, platelet count 27    Presumed ITP  Increased megakaryocytes noted on recent bone marrow biopsy  Has not started Promacta which was prescribed recently for platelet goal > 50 K, she was waiting until Hepatology follow up next week. Per chart review looks like they possibly switch this to Doptelet 20 mg po daily on 1/20/23  Platelet count 27 on 1/18, 34 today  Follows with hem/onc and has hepatology appt. 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 Hypothyroidism  Continue synthroid  Last TSH 5.44 12/1/22     Social/Spiritual/DNR/Other   Status: Ful  Diet ADULT DIET; Clear Liquid  Stress ulcer prophylaxis: Protonix  DVT prophylaxis: intermittent pneumatic compression boots  Consultations needed: Yes  Transfer out of ICU today? No    Lines/catheters  Date 01/22  Peripheral IVs    Patient remains critically ill and requiring ICU level care. Bianka Pratt MD  12:58 PM  01/24/23       I personally saw, examined and provided care for the patient. Radiographs, labs and medication list were reviewed by me independently. Review of Residents documentation was conducted and revisions were made as appropriate. I agree with the above documented exam, problem list and plan of care.         CCT excluding procedures 32 minutes    Karyn Rosenberg DO dry/healed scar from old sx

## (undated) DEVICE — REAGENT TEST UREASE RAPD CLOTEST F/

## (undated) DEVICE — GLOVE ORTHO 8   MSG9480

## (undated) DEVICE — GRADUATE TRIANG MEASURE 1000ML BLK PRNT

## (undated) DEVICE — FORCEPS BX OVL CUP FEN DISPOSABLE CAP L 160CM RAD JAW 4

## (undated) DEVICE — SIX SHOOTER SAEED MULTI-BAND LIGATOR: Brand: SAEED

## (undated) DEVICE — SPONGE GZ W4XL4IN RAYON POLY CVR W/NONWOVEN FAB STRL 2/PK

## (undated) DEVICE — BLOCK BITE 60FR RUBBER ADLT DENTAL

## (undated) DEVICE — TEN SHOOTER SAEED MULTI-BAND LIGATOR: Brand: SAEED